# Patient Record
Sex: FEMALE | Race: WHITE | NOT HISPANIC OR LATINO | Employment: UNEMPLOYED | URBAN - METROPOLITAN AREA
[De-identification: names, ages, dates, MRNs, and addresses within clinical notes are randomized per-mention and may not be internally consistent; named-entity substitution may affect disease eponyms.]

---

## 2017-01-27 ENCOUNTER — ALLSCRIPTS OFFICE VISIT (OUTPATIENT)
Dept: OTHER | Facility: OTHER | Age: 82
End: 2017-01-27

## 2017-09-22 ENCOUNTER — ALLSCRIPTS OFFICE VISIT (OUTPATIENT)
Dept: OTHER | Facility: OTHER | Age: 82
End: 2017-09-22

## 2017-09-22 PROCEDURE — 88305 TISSUE EXAM BY PATHOLOGIST: CPT | Performed by: PHYSICIAN ASSISTANT

## 2017-09-26 ENCOUNTER — LAB REQUISITION (OUTPATIENT)
Dept: LAB | Facility: HOSPITAL | Age: 82
End: 2017-09-26
Payer: MEDICARE

## 2017-09-26 DIAGNOSIS — R21 RASH AND OTHER NONSPECIFIC SKIN ERUPTION: ICD-10-CM

## 2017-10-02 ENCOUNTER — GENERIC CONVERSION - ENCOUNTER (OUTPATIENT)
Dept: OTHER | Facility: OTHER | Age: 82
End: 2017-10-02

## 2018-01-11 NOTE — MISCELLANEOUS
Message  reviewed nocturnal pulse oximetry dated 8/25/16  Done of BIPAP 10/5 with 1 5L  She is instructed to increase oxygen to 4L with BIPAP 10/5   This was discussed with daughter SALINA      Signatures   Electronically signed by : Lennox Litter, APN; Sep  7 2016  1:07PM EST                       (Author)

## 2018-01-12 NOTE — MISCELLANEOUS
Plan  Hypoxia    · Nocturnal Pulse Oximetry; Status:Active;  Requested MICHELINE:[id number];     Signatures   Electronically signed by : BUD Ferrer; Sep  7 2016  1:10PM EST                       (Author)

## 2018-01-14 VITALS — HEART RATE: 68 BPM | SYSTOLIC BLOOD PRESSURE: 128 MMHG | DIASTOLIC BLOOD PRESSURE: 72 MMHG | RESPIRATION RATE: 16 BRPM

## 2018-01-17 NOTE — CONSULTS
I had the pleasure of evaluating your patient, SALINA COOLEY  My full evaluation follows:      History of Present Illness  Carmenza is a pleasant 80-year-old female who presents to us today for evaluation of a nonhealing wound right ear wound  She is accompanied by her daughter and granddaughter who state that the lesion has been present for over a year and is not healing  They said that the she does tend to lay on her right side due to muscular dystrophy  They were initially told by their primary care physician that this could be a pressure ulcer verses a basal cell carcinoma  The patient complains of tenderness and occasionally drainage from the area  Discussion/Summary    Carmenza is a pleasant 80-year-old female who presents to us today for evaluation of a nonhealing wound right ear wound  Please see HPI  Biopsy done in the office today we will follow up in 2 weeks to discuss pathology  I instructed her and her family to try using pillows or sponges with ear cut out to try to avoid any excess pressure in that area  I also discussed with them that the biopsy site may be difficult to heal  They understood and agreed  They were encouraged to follow up with any problems regarding the biopsy site  Thank you very much for allowing me to participate in the care of this patient  If you have any questions, please do not hesitate to contact me        Signatures   Electronically signed by : Lit Herbert, [facility]; Sep 22 2017  1:44PM EST                       (Author)    Electronically signed by : JOSE RAUL Mclean ; Sep 22 2017  3:19PM EST

## 2018-01-29 ENCOUNTER — TELEPHONE (OUTPATIENT)
Dept: FAMILY MEDICINE CLINIC | Facility: CLINIC | Age: 83
End: 2018-01-29

## 2018-01-29 DIAGNOSIS — J34.89 THICK NASAL MUCUS: Primary | ICD-10-CM

## 2018-01-29 RX ORDER — ACETYLCYSTEINE 200 MG/ML
SOLUTION ORAL; RESPIRATORY (INHALATION)
Qty: 10 ML | Refills: 0 | Status: SHIPPED | OUTPATIENT
Start: 2018-01-29 | End: 2018-02-07

## 2018-01-29 NOTE — TELEPHONE ENCOUNTER
Patients daughter called and wants to know If Flora Hyman still schedules home visits for Dr Javier Silence    Her daughter, also SALINA (works in radiology) and can be reached at work on

## 2018-01-30 DIAGNOSIS — R05.9 COUGH: Primary | ICD-10-CM

## 2018-01-30 RX ORDER — PROMETHAZINE HYDROCHLORIDE AND CODEINE PHOSPHATE 6.25; 1 MG/5ML; MG/5ML
5 SYRUP ORAL 3 TIMES DAILY PRN
Qty: 120 ML | Refills: 0 | Status: SHIPPED | OUTPATIENT
Start: 2018-01-30 | End: 2018-04-25 | Stop reason: ALTCHOICE

## 2018-01-30 NOTE — TELEPHONE ENCOUNTER
I called Tamia  to set up an appt for her mother and she doesn't need an appt for her mother at this time  Her sister called Dr Jose Angel Christiansen office  He gave her some medication for her "cold"  She will call back here if they do need an appt with Dr Flori Mosqueda

## 2018-02-02 ENCOUNTER — TELEPHONE (OUTPATIENT)
Dept: PULMONOLOGY | Facility: MEDICAL CENTER | Age: 83
End: 2018-02-02

## 2018-02-05 DIAGNOSIS — R05.9 COUGH: Primary | ICD-10-CM

## 2018-02-05 RX ORDER — BENZONATATE 100 MG/1
100 CAPSULE ORAL 2 TIMES DAILY PRN
Qty: 30 CAPSULE | Refills: 0 | Status: SHIPPED | OUTPATIENT
Start: 2018-02-05 | End: 2018-04-26

## 2018-02-05 NOTE — PROGRESS NOTES
Patient's daughter called about pesristent cough despite antibiotic and prednisone  Cough is non-productve, ne fever, or shortness of breath  Likely cough related to recent infection      Rx benzonatate 100 mg BID PRN and if does not help can increase dose to 200 mg BID

## 2018-02-08 ENCOUNTER — TELEPHONE (OUTPATIENT)
Dept: FAMILY MEDICINE CLINIC | Facility: CLINIC | Age: 83
End: 2018-02-08

## 2018-02-08 DIAGNOSIS — R06.02 SHORTNESS OF BREATH: Primary | ICD-10-CM

## 2018-02-08 RX ORDER — ALBUTEROL SULFATE 0.63 MG/3ML
SOLUTION RESPIRATORY (INHALATION) EVERY 8 HOURS
COMMUNITY
Start: 2016-01-27 | End: 2019-02-28 | Stop reason: SDUPTHER

## 2018-02-08 RX ORDER — ALBUTEROL SULFATE 0.63 MG/3ML
SOLUTION RESPIRATORY (INHALATION) EVERY 8 HOURS
Qty: 75 ML | Refills: 0 | Status: CANCELLED | OUTPATIENT
Start: 2018-02-08

## 2018-02-08 RX ORDER — IPRATROPIUM BROMIDE AND ALBUTEROL SULFATE 2.5; .5 MG/3ML; MG/3ML
3 SOLUTION RESPIRATORY (INHALATION)
Qty: 30 ML | Refills: 6 | Status: SHIPPED | OUTPATIENT
Start: 2018-02-08 | End: 2020-01-13 | Stop reason: SDUPTHER

## 2018-02-08 NOTE — TELEPHONE ENCOUNTER
Dr Ngozi Jauregui the family is calling asking about the albuteralthat they wanted to get from [facility]  Was this sent to the pharmacy?

## 2018-02-08 NOTE — TELEPHONE ENCOUNTER
I called patient regarding the mistake that I made yesterday with this Rx  I also told her that the rx will be sent to the pharmacy this afternoon    I also called [facility] Cho

## 2018-02-19 ENCOUNTER — TELEPHONE (OUTPATIENT)
Dept: FAMILY MEDICINE CLINIC | Facility: CLINIC | Age: 83
End: 2018-02-19

## 2018-02-19 NOTE — TELEPHONE ENCOUNTER
Carmenza calling because she wanted to speak with Blossom Maldonado regarding visiting nurse for her mom  She wanted to get Community visiting nursing  Also, she is looking to get a refill on predisone for her mom's cough      Please call Carmenza back at [phone]    Thank You

## 2018-04-25 ENCOUNTER — TELEPHONE (OUTPATIENT)
Dept: FAMILY MEDICINE CLINIC | Facility: CLINIC | Age: 83
End: 2018-04-25

## 2018-04-25 DIAGNOSIS — R05.9 COUGH: Primary | ICD-10-CM

## 2018-04-25 RX ORDER — DEXTROMETHORPHAN HYDROBROMIDE AND PROMETHAZINE HYDROCHLORIDE 15; 6.25 MG/5ML; MG/5ML
5 SYRUP ORAL 4 TIMES DAILY PRN
Qty: 118 ML | Refills: 0 | Status: SHIPPED | OUTPATIENT
Start: 2018-04-25 | End: 2018-05-02

## 2018-04-25 NOTE — TELEPHONE ENCOUNTER
Bri Son (daughter) is calling to schedule apt with Dr Buck Kras  He is not available today so she wanted to know if he could call something in for Bri Son  She is coughing, congested with phlegm        Please call daughter, Bri Son at [phone]    [facility]    Thanks

## 2018-04-25 NOTE — TELEPHONE ENCOUNTER
Aracelis Silva is aware that cough medicine was called in but she really wanted to get an antibiotic for her mom as well        Aracelis Silva daughter is [phone]    Thank you

## 2018-04-26 ENCOUNTER — APPOINTMENT (EMERGENCY)
Dept: RADIOLOGY | Facility: HOSPITAL | Age: 83
End: 2018-04-26
Payer: MEDICARE

## 2018-04-26 ENCOUNTER — HOSPITAL ENCOUNTER (EMERGENCY)
Facility: HOSPITAL | Age: 83
Discharge: HOME | End: 2018-04-27
Attending: EMERGENCY MEDICINE | Admitting: EMERGENCY MEDICINE
Payer: MEDICARE

## 2018-04-26 VITALS
BODY MASS INDEX: 41.81 KG/M2 | TEMPERATURE: 99.3 F | DIASTOLIC BLOOD PRESSURE: 65 MMHG | WEIGHT: 275 LBS | SYSTOLIC BLOOD PRESSURE: 146 MMHG | OXYGEN SATURATION: 93 % | HEART RATE: 106 BPM | RESPIRATION RATE: 22 BRPM

## 2018-04-26 DIAGNOSIS — N39.0 UTI (URINARY TRACT INFECTION): Primary | ICD-10-CM

## 2018-04-26 LAB
ALBUMIN SERPL BCP-MCNC: 3.1 G/DL (ref 3.5–5)
ALP SERPL-CCNC: 109 U/L (ref 46–116)
ALT SERPL W P-5'-P-CCNC: 19 U/L (ref 12–78)
ANION GAP SERPL CALCULATED.3IONS-SCNC: 6 MMOL/L (ref 4–13)
APTT PPP: 31 SECONDS (ref 23–35)
ARTERIAL PATENCY WRIST A: YES
AST SERPL W P-5'-P-CCNC: 13 U/L (ref 5–45)
BACTERIA UR QL AUTO: ABNORMAL /HPF
BASE EXCESS BLDA CALC-SCNC: -0.3 MMOL/L
BASOPHILS # BLD AUTO: 0 THOUSANDS/ΜL (ref 0–0.1)
BASOPHILS NFR BLD AUTO: 0 % (ref 0–1)
BILIRUB SERPL-MCNC: 0.3 MG/DL (ref 0.2–1)
BILIRUB UR QL STRIP: NEGATIVE
BODY TEMPERATURE: 99 DEGREES FEHRENHEIT
BUN SERPL-MCNC: 11 MG/DL (ref 5–25)
CALCIUM SERPL-MCNC: 9.4 MG/DL (ref 8.3–10.1)
CHLORIDE SERPL-SCNC: 101 MMOL/L (ref 100–108)
CLARITY UR: ABNORMAL
CO2 SERPL-SCNC: 34 MMOL/L (ref 21–32)
COLOR UR: YELLOW
CREAT SERPL-MCNC: 0.31 MG/DL (ref 0.6–1.3)
EOSINOPHIL # BLD AUTO: 0 THOUSAND/ΜL (ref 0–0.61)
EOSINOPHIL NFR BLD AUTO: 0 % (ref 0–6)
ERYTHROCYTE [DISTWIDTH] IN BLOOD BY AUTOMATED COUNT: 15.2 % (ref 11.6–15.1)
GFR SERPL CREATININE-BSD FRML MDRD: 103 ML/MIN/1.73SQ M
GLUCOSE SERPL-MCNC: 112 MG/DL (ref 65–140)
GLUCOSE UR STRIP-MCNC: NEGATIVE MG/DL
HCO3 BLDA-SCNC: 25.3 MMOL/L (ref 22–28)
HCT VFR BLD AUTO: 40.5 % (ref 37–47)
HGB BLD-MCNC: 13.1 G/DL (ref 12–16)
HGB UR QL STRIP.AUTO: ABNORMAL
INR PPP: 1.04 (ref 0.86–1.16)
KETONES UR STRIP-MCNC: ABNORMAL MG/DL
LACTATE SERPL-SCNC: 0.8 MMOL/L (ref 0.5–2)
LEUKOCYTE ESTERASE UR QL STRIP: ABNORMAL
LYMPHOCYTES # BLD AUTO: 0.9 THOUSANDS/ΜL (ref 0.6–4.47)
LYMPHOCYTES NFR BLD AUTO: 9 % (ref 14–44)
MCH RBC QN AUTO: 27.7 PG (ref 27–31)
MCHC RBC AUTO-ENTMCNC: 32.4 G/DL (ref 31.4–37.4)
MCV RBC AUTO: 86 FL (ref 82–98)
MONOCYTES # BLD AUTO: 0.6 THOUSAND/ΜL (ref 0.17–1.22)
MONOCYTES NFR BLD AUTO: 6 % (ref 4–12)
NASAL CANNULA: 2
NEUTROPHILS # BLD AUTO: 8.6 THOUSANDS/ΜL (ref 1.85–7.62)
NEUTS SEG NFR BLD AUTO: 84 % (ref 43–75)
NITRITE UR QL STRIP: POSITIVE
NON-SQ EPI CELLS URNS QL MICRO: ABNORMAL /HPF
NRBC BLD AUTO-RTO: 0 /100 WBCS
O2 CT BLDA-SCNC: 16.3 ML/DL (ref 16–23)
OXYHGB MFR BLDA: 91.2 % (ref 94–97)
PCO2 BLDA: 45.2 MM HG (ref 36–44)
PCO2 TEMP ADJ BLDA: 45.6 MM HG (ref 36–44)
PH BLD: 7.36 [PH] (ref 7.35–7.45)
PH BLDA: 7.37 [PH] (ref 7.35–7.45)
PH UR STRIP.AUTO: 6 [PH] (ref 5–9)
PLATELET # BLD AUTO: 232 THOUSANDS/UL (ref 130–400)
PLATELET BLD QL SMEAR: ADEQUATE
PMV BLD AUTO: 7 FL (ref 8.9–12.7)
PO2 BLD: 63.5 MM HG (ref 75–129)
PO2 BLDA: 62.6 MM HG (ref 75–129)
POTASSIUM SERPL-SCNC: 4.3 MMOL/L (ref 3.5–5.3)
PROT SERPL-MCNC: 7.3 G/DL (ref 6.4–8.2)
PROT UR STRIP-MCNC: NEGATIVE MG/DL
PROTHROMBIN TIME: 10.9 SECONDS (ref 9.4–11.7)
RBC # BLD AUTO: 4.73 MILLION/UL (ref 4.2–5.4)
RBC #/AREA URNS AUTO: ABNORMAL /HPF
SODIUM SERPL-SCNC: 141 MMOL/L (ref 136–145)
SP GR UR STRIP.AUTO: <=1.005 (ref 1–1.03)
SPECIMEN SOURCE: ABNORMAL
TROPONIN I SERPL-MCNC: <0.02 NG/ML
UROBILINOGEN UR QL STRIP.AUTO: 0.2 E.U./DL
WBC # BLD AUTO: 10.2 THOUSAND/UL (ref 4.8–10.8)
WBC #/AREA URNS AUTO: ABNORMAL /HPF

## 2018-04-26 PROCEDURE — 83605 ASSAY OF LACTIC ACID: CPT | Performed by: EMERGENCY MEDICINE

## 2018-04-26 PROCEDURE — 87086 URINE CULTURE/COLONY COUNT: CPT | Performed by: EMERGENCY MEDICINE

## 2018-04-26 PROCEDURE — 87633 RESP VIRUS 12-25 TARGETS: CPT | Performed by: EMERGENCY MEDICINE

## 2018-04-26 PROCEDURE — 87186 SC STD MICRODIL/AGAR DIL: CPT | Performed by: EMERGENCY MEDICINE

## 2018-04-26 PROCEDURE — 85610 PROTHROMBIN TIME: CPT | Performed by: EMERGENCY MEDICINE

## 2018-04-26 PROCEDURE — 87040 BLOOD CULTURE FOR BACTERIA: CPT | Performed by: EMERGENCY MEDICINE

## 2018-04-26 PROCEDURE — 85025 COMPLETE CBC W/AUTO DIFF WBC: CPT | Performed by: EMERGENCY MEDICINE

## 2018-04-26 PROCEDURE — 80053 COMPREHEN METABOLIC PANEL: CPT | Performed by: EMERGENCY MEDICINE

## 2018-04-26 PROCEDURE — 36600 WITHDRAWAL OF ARTERIAL BLOOD: CPT

## 2018-04-26 PROCEDURE — 84484 ASSAY OF TROPONIN QUANT: CPT | Performed by: EMERGENCY MEDICINE

## 2018-04-26 PROCEDURE — 81001 URINALYSIS AUTO W/SCOPE: CPT | Performed by: EMERGENCY MEDICINE

## 2018-04-26 PROCEDURE — 94640 AIRWAY INHALATION TREATMENT: CPT

## 2018-04-26 PROCEDURE — 36415 COLL VENOUS BLD VENIPUNCTURE: CPT | Performed by: EMERGENCY MEDICINE

## 2018-04-26 PROCEDURE — 85730 THROMBOPLASTIN TIME PARTIAL: CPT | Performed by: EMERGENCY MEDICINE

## 2018-04-26 PROCEDURE — 71045 X-RAY EXAM CHEST 1 VIEW: CPT

## 2018-04-26 PROCEDURE — 87077 CULTURE AEROBIC IDENTIFY: CPT | Performed by: EMERGENCY MEDICINE

## 2018-04-26 PROCEDURE — 82805 BLOOD GASES W/O2 SATURATION: CPT | Performed by: EMERGENCY MEDICINE

## 2018-04-26 RX ORDER — IPRATROPIUM BROMIDE AND ALBUTEROL SULFATE 2.5; .5 MG/3ML; MG/3ML
SOLUTION RESPIRATORY (INHALATION)
Status: COMPLETED
Start: 2018-04-26 | End: 2018-04-26

## 2018-04-26 RX ORDER — CEPHALEXIN 250 MG/1
500 CAPSULE ORAL 4 TIMES DAILY
Qty: 28 CAPSULE | Refills: 0 | Status: SHIPPED | OUTPATIENT
Start: 2018-04-26 | End: 2018-05-03

## 2018-04-26 RX ORDER — FUROSEMIDE 20 MG/1
20 TABLET ORAL DAILY
COMMUNITY
End: 2019-01-14 | Stop reason: SDUPTHER

## 2018-04-26 RX ORDER — IPRATROPIUM BROMIDE AND ALBUTEROL SULFATE 2.5; .5 MG/3ML; MG/3ML
3 SOLUTION RESPIRATORY (INHALATION)
Status: DISCONTINUED | OUTPATIENT
Start: 2018-04-26 | End: 2018-04-26 | Stop reason: SDUPTHER

## 2018-04-26 RX ORDER — CEPHALEXIN 500 MG/1
500 CAPSULE ORAL ONCE
Status: COMPLETED | OUTPATIENT
Start: 2018-04-26 | End: 2018-04-26

## 2018-04-26 RX ADMIN — CEPHALEXIN 500 MG: 500 CAPSULE ORAL at 23:50

## 2018-04-26 RX ADMIN — IPRATROPIUM BROMIDE AND ALBUTEROL SULFATE 3 ML: 2.5; .5 SOLUTION RESPIRATORY (INHALATION) at 21:18

## 2018-04-26 RX ADMIN — IPRATROPIUM BROMIDE AND ALBUTEROL SULFATE 3 ML: .5; 3 SOLUTION RESPIRATORY (INHALATION) at 21:18

## 2018-04-27 PROCEDURE — 99285 EMERGENCY DEPT VISIT HI MDM: CPT

## 2018-04-27 NOTE — ED NOTES
May from the lab called and informed this nurse of procalcitonin hemolysis   Dr Emeka Clarke will be made aware upon arrival      Bala Restrepo RN  04/27/18 3901

## 2018-04-27 NOTE — ED NOTES
Dr Ara Baldwin aware of procalcitonin recollect orders  States that since patient was discharged there is nothing additional to be done       Kyle Gary RN  04/27/18 9346

## 2018-04-27 NOTE — ED PROVIDER NOTES
History  Chief Complaint   Patient presents with    Weakness - Generalized     pt presents to the ed with cough and weakness x2 days  as per daughter    Suni Bolden Cough     80year-old female presents with generalized weakness cough congestion over the last few days  Patient states she is not having real high temperatures however temp was 99° here  O2 sats 93% on room air  Patient is awake and alert appears to be quite weak  History provided by:  Patient and relative   used: No        Prior to Admission Medications   Prescriptions Last Dose Informant Patient Reported? Taking?   acetaminophen (TYLENOL) 325 mg tablet   No No   Sig: Take one or 2 every 6 hours as needed for pain   albuterol (ACCUNEB) 0 63 MG/3ML nebulizer solution   Yes No   Sig: Inhale every 8 (eight) hours   celecoxib (CeleBREX) 200 mg capsule   Yes No   Sig: Take 200 mg by mouth daily  diltiazem (CARDIZEM) 120 MG tablet   Yes No   Sig: Take 120 mg by mouth 2 (two) times a day  furosemide (LASIX) 20 mg tablet   Yes Yes   Sig: Take 20 mg by mouth daily Pt takes half a pill 3 x a week   ipratropium-albuterol (DUO-NEB) 0 5-2 5 mg/3 mL   No No   Sig: Take 3 mL by nebulization every 6 (six) hours   metoprolol succinate (TOPROL-XL) 25 mg 24 hr tablet   Yes No   Sig: Take 25 mg by mouth 2 (two) times a day  promethazine-dextromethorphan (PHENERGAN-DM) 6 25-15 mg/5 mL oral syrup   No No   Sig: Take 5 mL by mouth 4 (four) times a day as needed for cough for up to 7 days      Facility-Administered Medications: None       Past Medical History:   Diagnosis Date    Arthritis     Muscular dystrophy ([facility] [address] )        Past Surgical History:   Procedure Laterality Date    APPENDECTOMY      CHOLECYSTECTOMY      HYSTERECTOMY      THYROID CYST EXCISION      VEIN SURGERY Bilateral        History reviewed  No pertinent family history  I have reviewed and agree with the history as documented      Social History   Substance Use Topics    Smoking status: Never Smoker    Smokeless tobacco: Not on file    Alcohol use No        Review of Systems   Constitutional: Negative for activity change, chills, diaphoresis and fever  HENT: Negative for congestion, ear pain, nosebleeds, sore throat, trouble swallowing and voice change  Eyes: Negative for pain, discharge and redness  Respiratory: Positive for cough and shortness of breath  Negative for apnea, choking, wheezing and stridor  Cardiovascular: Negative for chest pain and palpitations  Gastrointestinal: Negative for abdominal distention, abdominal pain, constipation, diarrhea, nausea and vomiting  Endocrine: Negative for polydipsia  Genitourinary: Negative for difficulty urinating, dysuria, flank pain, frequency, hematuria and urgency  Musculoskeletal: Negative for back pain, gait problem, joint swelling, myalgias, neck pain and neck stiffness  Skin: Negative for pallor and rash  Neurological: Positive for weakness  Negative for dizziness, tremors, syncope, speech difficulty, numbness and headaches  Hematological: Negative for adenopathy  Psychiatric/Behavioral: Negative for confusion, hallucinations, self-injury and suicidal ideas  The patient is not nervous/anxious  Physical Exam  ED Triage Vitals [04/26/18 2116]   Temperature Pulse Respirations Blood Pressure SpO2   99 °F (37 2 °C) 92 20 156/78 94 %      Temp Source Heart Rate Source Patient Position - Orthostatic VS BP Location FiO2 (%)   Tympanic Monitor -- -- --      Pain Score       --           Orthostatic Vital Signs  Vitals:    04/26/18 2116 04/26/18 2215 04/26/18 2230   BP: 156/78     Pulse: 92 96 96       Physical Exam   Constitutional: She is oriented to person, place, and time  Vital signs are normal  She appears well-developed and well-nourished  HENT:   Head: Normocephalic and atraumatic     Right Ear: External ear normal    Left Ear: External ear normal    Nose: Nose normal    Mouth/Throat: Oropharynx is clear and moist    Eyes: Conjunctivae and EOM are normal  Pupils are equal, round, and reactive to light  Neck: Normal range of motion  Neck supple  Cardiovascular: Normal rate, regular rhythm, normal heart sounds and intact distal pulses  Pulmonary/Chest: She is in respiratory distress  She has rales  Abdominal: Soft  Bowel sounds are normal    Musculoskeletal: Normal range of motion  Neurological: She is alert and oriented to person, place, and time  Skin: Skin is warm  Psychiatric: She has a normal mood and affect  Nursing note and vitals reviewed  ED Medications  Medications   cephalexin (KEFLEX) capsule 500 mg (not administered)       Diagnostic Studies  Results Reviewed     Procedure Component Value Units Date/Time    Urine Microscopic [[id number]]  (Abnormal) Collected:  04/26/18 2258    Lab Status:  Final result Specimen:  Urine from Urine, Clean Catch Updated:  04/26/18 2316     RBC, UA 0-1 (A) /hpf      WBC, UA 0-1 (A) /hpf      Epithelial Cells Moderate (A) /hpf      Bacteria, UA Innumerable (A) /hpf     Procalcitonin [[id number]] Collected:  04/26/18 2310    Lab Status: In process Specimen:  Blood from Arm, Right Updated:  04/26/18 2313    Respiratory Pathogen Profile, PCR [[id number]] Collected:  04/26/18 2310    Lab Status:   In process Specimen:  Nasopharyngeal from Nasopharyngeal Swab Updated:  04/26/18 2313    UA w Reflex to Microscopic [[id number]]  (Abnormal) Collected:  04/26/18 2258    Lab Status:  Final result Specimen:  Urine from Urine, Clean Catch Updated:  04/26/18 2306     Color, UA Yellow     Clarity, UA Slightly Cloudy     Specific Gravity, UA <=1 005     pH, UA 6 0     Leukocytes, UA Trace (A)     Nitrite, UA Positive (A)     Protein, UA Negative mg/dl      Glucose, UA Negative mg/dl      Ketones, UA 15 (1+) (A) mg/dl      Urobilinogen, UA 0 2 E U /dl      Bilirubin, UA Negative     Blood, UA Small (A)    Urine culture [[id number]] Collected:  04/26/18 2258    Lab Status: In process Specimen:  Urine from Urine, Clean Catch Updated:  04/26/18 2303    Lactic acid, plasma [[id number]]  (Normal) Collected:  04/26/18 2138    Lab Status:  Final result Specimen:  Blood from Arm, Left Updated:  04/26/18 2213     LACTIC ACID 0 8 mmol/L     Narrative:         Result may be elevated if tourniquet was used during collection  Troponin I [[id number]]  (Normal) Collected:  04/26/18 2138    Lab Status:  Final result Specimen:  Blood from Arm, Left Updated:  04/26/18 2211     Troponin I <0 02 ng/mL     Narrative:         [employer] Chemistry analyzer 99% cutoff is > 0 04 ng/mL in network labs    o cTnI 99% cutoff is useful only when applied to patients in the clinical setting of myocardial ischemia  o cTnI 99% cutoff should be interpreted in the context of clinical history, ECG findings and possibly cardiac imaging to establish correct diagnosis  o cTnI 99% cutoff may be suggestive but clearly not indicative of a coronary event without the clinical setting of myocardial ischemia      Blood gas, arterial [[id number]]  (Abnormal) Collected:  04/26/18 2156    Lab Status:  Final result Specimen:  Blood, Arterial from Radial, Left Updated:  04/26/18 2210     pH, Arterial 7 366     PH ART TC 7 363     pCO2, Arterial 45 2 (H) mm Hg      PCO2 (TC) Arterial 45 6 (H) mm Hg      pO2, Arterial 62 6 (L) mm Hg      PO2 (TC) Arterial 63 5 (L) mm Hg      HCO3, Arterial 25 3 mmol/L      Base Excess, Arterial -0 3 mmol/L      O2 Content, Arterial 16 3 mL/dL      O2 HGB,Arterial  91 2 (L) %      SOURCE Radial, Left     MANDO TEST Yes     Temperature 99 0 Degrees Fehrenheit      Nasal Cannula 2    Comprehensive metabolic panel [[id number]]  (Abnormal) Collected:  04/26/18 2138    Lab Status:  Final result Specimen:  Blood from Arm, Left Updated:  04/26/18 2208     Sodium 141 mmol/L      Potassium 4 3 mmol/L      Chloride 101 mmol/L      CO2 34 (H) mmol/L      Anion Gap 6 mmol/L      BUN 11 mg/dL      Creatinine 0 31 (L) mg/dL Glucose 112 mg/dL      Calcium 9 4 mg/dL      AST 13 U/L      ALT 19 U/L      Alkaline Phosphatase 109 U/L      Total Protein 7 3 g/dL      Albumin 3 1 (L) g/dL      Total Bilirubin 0 30 mg/dL      eGFR 103 ml/min/1 73sq m     Narrative:         National Kidney Disease Education Program recommendations are as follows:  GFR calculation is accurate only with a steady state creatinine  Chronic Kidney disease less than 60 ml/min/1 73 sq  meters  Kidney failure less than 15 ml/min/1 73 sq  meters  Protime-INR [[id number]]  (Normal) Collected:  04/26/18 2138    Lab Status:  Final result Specimen:  Blood from Arm, Left Updated:  04/26/18 2201     Protime 10 9 seconds      INR 1 04    APTT [[id number]]  (Normal) Collected:  04/26/18 2138    Lab Status:  Final result Specimen:  Blood from Arm, Left Updated:  04/26/18 2201     PTT 31 seconds     CBC and differential [[id number]]  (Abnormal) Collected:  04/26/18 2138    Lab Status:  Final result Specimen:  Blood from Arm, Left Updated:  04/26/18 2200     WBC 10 20 Thousand/uL      RBC 4 73 Million/uL      Hemoglobin 13 1 g/dL      Hematocrit 40 5 %      MCV 86 fL      MCH 27 7 pg      MCHC 32 4 g/dL      RDW 15 2 (H) %      MPV 7 0 (L) fL      Platelets 611 Thousands/uL      nRBC 0 /100 WBCs      Neutrophils Relative 84 (H) %      Lymphocytes Relative 9 (L) %      Monocytes Relative 6 %      Eosinophils Relative 0 %      Basophils Relative 0 %      Neutrophils Absolute 8 60 (H) Thousands/µL      Lymphocytes Absolute 0 90 Thousands/µL      Monocytes Absolute 0 60 Thousand/µL      Eosinophils Absolute 0 00 Thousand/µL      Basophils Absolute 0 00 Thousands/µL     Blood culture #2 [[id number]] Collected:  04/26/18 2138    Lab Status: In process Specimen:  Blood from Arm, Left Updated:  04/26/18 2146    Blood culture #1 [[id number]] Collected:  04/26/18 2138    Lab Status:   In process Specimen:  Blood from Arm, Left Updated:  04/26/18 2146                 XR chest 1 view portable (Results Pending)              Procedures  Procedures       Phone Contacts  ED Phone Contact    ED Course                               MDM  CritCare Time    Disposition  Final diagnoses:   UTI (urinary tract infection)     Time reflects when diagnosis was documented in both MDM as applicable and the Disposition within this note     Time User Action Codes Description Comment    4/26/2018 11:28 PM Cristy CHAPA Add [N39 0] UTI (urinary tract infection)       ED Disposition     ED Disposition Condition Comment    Discharge  [facility] [address] discharge to home/self care  Condition at discharge: Stable        Follow-up Information     Follow up With Specialties Details Why DO [facility] Schedule an appointment as soon as possible for a visit  [address]  [phone]          Patient's Medications   Discharge Prescriptions    CEPHALEXIN (KEFLEX) 250 MG CAPSULE    Take 2 capsules (500 mg total) by mouth 4 (four) times a day for 7 days       Start Date: 4/26/2018 End Date: 5/3/2018       Order Dose: 500 mg       Quantity: 28 capsule    Refills: 0     No discharge procedures on file      ED Provider  Electronically Signed by           Mckayla Herrera DO  04/26/18 8169

## 2018-04-27 NOTE — DISCHARGE INSTRUCTIONS

## 2018-04-28 LAB
ADENOVIRUS: NOT DETECTED
C PNEUM DNA SPEC QL NAA+PROBE: NOT DETECTED
FLUAV H1 RNA SPEC QL NAA+PROBE: NOT DETECTED
FLUAV H3 RNA SPEC QL NAA+PROBE: NOT DETECTED
FLUAV RNA SPEC QL NAA+PROBE: NOT DETECTED
FLUBV RNA SPEC QL NAA+PROBE: NOT DETECTED
HBOV DNA SPEC QL NAA+PROBE: NOT DETECTED
HCOV 229E RNA SPEC QL NAA+PROBE: NOT DETECTED
HCOV HKU1 RNA SPEC QL NAA+PROBE: NOT DETECTED
HCOV NL63 RNA SPEC QL NAA+PROBE: NOT DETECTED
HCOV OC43 RNA SPEC QL NAA+PROBE: NOT DETECTED
HPIV1 RNA SPEC QL NAA+PROBE: NOT DETECTED
HPIV2 RNA SPEC QL NAA+PROBE: NOT DETECTED
HPIV3 RNA SPEC QL NAA+PROBE: NOT DETECTED
HPIV4 RNA SPEC QL NAA+PROBE: NOT DETECTED
M PNEUMO DNA SPEC QL NAA+PROBE: NOT DETECTED
METAPNEUMOVIRUS: NOT DETECTED
RHINOVIRUS RNA SPEC QL NAA+PROBE: DETECTED
RSV A RNA SPEC QL NAA+PROBE: NOT DETECTED
RSV B RNA SPEC QL NAA+PROBE: NOT DETECTED

## 2018-04-29 LAB — BACTERIA UR CULT: ABNORMAL

## 2018-05-02 LAB
BACTERIA BLD CULT: NORMAL
BACTERIA BLD CULT: NORMAL

## 2018-05-08 ENCOUNTER — TELEPHONE (OUTPATIENT)
Dept: FAMILY MEDICINE CLINIC | Facility: CLINIC | Age: 83
End: 2018-05-08

## 2018-05-08 NOTE — TELEPHONE ENCOUNTER
Spoke with Killian Russell from  [facility]       Called  In Bactrim #6 1po BID x3 days      Confirmed dose with dr Suni Romeo  As  abx had only a qty of 1 and was not sent to a pharmacy  af/rma

## 2018-05-10 ENCOUNTER — TELEPHONE (OUTPATIENT)
Dept: FAMILY MEDICINE CLINIC | Facility: CLINIC | Age: 83
End: 2018-05-10

## 2018-05-10 DIAGNOSIS — N39.0 ACUTE UTI: Primary | ICD-10-CM

## 2018-05-10 NOTE — TELEPHONE ENCOUNTER
Her Daughter called and is asking to verify the dose of the bactrium it was sent for one tab and Dr Gabby Lee made it for three days, should this be longer? Please call 1 Mely Bailey to confirm    Daughter is at [phone] at the [facility]

## 2018-05-11 RX ORDER — SULFAMETHOXAZOLE AND TRIMETHOPRIM 800; 160 MG/1; MG/1
1 TABLET ORAL EVERY 12 HOURS SCHEDULED
Qty: 8 TABLET | Refills: 0 | Status: SHIPPED | OUTPATIENT
Start: 2018-05-11 | End: 2018-05-15

## 2018-05-11 NOTE — TELEPHONE ENCOUNTER
Lanelle Holter is aware Dr GILBERT sent the antibiotics   She follows with Dr Deric Black for home visits since she is homebound and I will forward this message to Dr Deric Black to address the order for [address]

## 2018-05-11 NOTE — TELEPHONE ENCOUNTER
Dr Marya Red called back looking for the additional antibiotics since Dr Cielo Ruffin had wanted her on them for one week   Would you be able to give them the additional 8 pills and then forward this to Dr Cielo Ruffin since they are also looking for an order for her to have PT (it's respiratory PT) The PT order message can be addressed by Dr Cielo Ruffin next week [facility]

## 2018-05-14 NOTE — TELEPHONE ENCOUNTER
Please find out where the request for the Respirtory Therapist was printed and send it to the appropriate person requested

## 2018-05-15 NOTE — TELEPHONE ENCOUNTER
This is a [facility] patient but the daughter has been calling here for orders  She was a former Dr Durrell Runner patient  Would someone please write her PT order that her daughter Cristina Ramirez requested? Thanks!  Aleyda Gonzalez

## 2018-05-29 ENCOUNTER — TELEPHONE (OUTPATIENT)
Dept: FAMILY MEDICINE CLINIC | Facility: CLINIC | Age: 83
End: 2018-05-29

## 2018-05-29 NOTE — TELEPHONE ENCOUNTER
Patient's daughter would like to speak with you  The last time you saw her mother you mentioned something about a Respiratory Therapist   She would like to speak with you about that

## 2018-05-29 NOTE — TELEPHONE ENCOUNTER
Patient asking for respiratory therapy  I thought I ordered that when I saw her last time  Please check on this and let me know  If I didn't then I will order it; if it was ordered, where did the order go?

## 2018-06-06 ENCOUNTER — TELEPHONE (OUTPATIENT)
Dept: FAMILY MEDICINE CLINIC | Facility: CLINIC | Age: 83
End: 2018-06-06

## 2018-06-06 DIAGNOSIS — G71.00 MD (MUSCULAR DYSTROPHY) (HCC): ICD-10-CM

## 2018-06-06 DIAGNOSIS — R06.89 DIFFICULTY BREATHING: Primary | ICD-10-CM

## 2018-06-06 DIAGNOSIS — J44.1 CHRONIC OBSTRUCTIVE PULMONARY DISEASE WITH ACUTE EXACERBATION (HCC): ICD-10-CM

## 2018-06-07 NOTE — TELEPHONE ENCOUNTER
Barbara Conner can you redo the order for the patient? It needs to say Physical Therapy instead of Pulmonology  Please use comments from original order

## 2018-07-12 ENCOUNTER — TELEPHONE (OUTPATIENT)
Dept: FAMILY MEDICINE CLINIC | Facility: CLINIC | Age: 83
End: 2018-07-12

## 2018-07-12 DIAGNOSIS — J20.9 ACUTE BRONCHITIS, UNSPECIFIED ORGANISM: ICD-10-CM

## 2018-07-12 DIAGNOSIS — J20.9 CHRONIC BRONCHITIS WITH ACUTE EXACERBATION (HCC): Primary | ICD-10-CM

## 2018-07-12 DIAGNOSIS — R06.2 WHEEZING: Primary | ICD-10-CM

## 2018-07-12 DIAGNOSIS — J42 CHRONIC BRONCHITIS WITH ACUTE EXACERBATION (HCC): Primary | ICD-10-CM

## 2018-07-12 RX ORDER — AZITHROMYCIN 250 MG/1
TABLET, FILM COATED ORAL
Qty: 6 TABLET | Refills: 0 | Status: SHIPPED | OUTPATIENT
Start: 2018-07-12 | End: 2018-07-16

## 2018-07-12 RX ORDER — PREDNISONE 20 MG/1
TABLET ORAL
Qty: 10 TABLET | Refills: 0 | Status: SHIPPED | OUTPATIENT
Start: 2018-07-12 | End: 2019-02-21

## 2018-07-12 NOTE — PROGRESS NOTES
I spoke to patient's daughter Carlos Stewart  Patient has been having persistent cough for the past few days difficulty expectorating mucus  Also some wheezing shortness of breath    I will prescribe 5 day azithromycin pack for asthmatic bronchitis and prednisone 40 mg x5 and 20 mg x5  She can use her nebulizer with albuterol 2 5 mg 3 times a day and take Robitussin over-the-counter as an expectorant    Patient is bed bound and family would like visiting nurse to see patient    Will make referral for visiting nurse

## 2018-07-12 NOTE — TELEPHONE ENCOUNTER
Spoke with [employer]  She states her sister Jeff Saldaña is away so she has been taking care of her mother  [employer] reports her mother is experiencing lots of "phlegm buildup" that she cannot expectorate  She is currently on ABX  The daughter, along with the assistance of another person, is able to get her into a wheelchair which aids in her coughing  Advised patients daughter that Dr Myra Curiel is out of the office until Tuesday and encouraged daughter to continue getting her out of bed safely as much as possible  Patients daughter is requesting Visiting nurses to come and perform percussion to help patient expectorate mucus

## 2018-07-12 NOTE — PROGRESS NOTES
I spoke with patient's daughter, Panchito Horner  Patient has had some persistent cough which is worse now over the past few days and difficulty expectorate mucus and wheezing     I will prescribe a 5 day Z-Herrera and prednisone 40 mg for 5 days then 20 mg for 5 days    Will try to get visiting nurse to see patient as she is home bound and bed ridden

## 2018-07-12 NOTE — TELEPHONE ENCOUNTER
Ngozi Briones left me a VM with concerns about the patients health  This is a Dr Liz Walsh patient  Can you please triage this call and advise that Dr Liz Walsh is only available on Tuesday and Wednesday

## 2018-07-12 NOTE — TELEPHONE ENCOUNTER
Pt daughter called to say that pt has a chest cold and wanted dr Gianfranco Lopez to come out asap   Explain to daughter, per Tita Sears that dr Gianfranco Lopez only made house calls tues and wed and was advised to take pt to er

## 2018-07-13 ENCOUNTER — TELEPHONE (OUTPATIENT)
Dept: FAMILY MEDICINE CLINIC | Facility: CLINIC | Age: 83
End: 2018-07-13

## 2018-07-17 ENCOUNTER — TELEPHONE (OUTPATIENT)
Dept: FAMILY MEDICINE CLINIC | Facility: CLINIC | Age: 83
End: 2018-07-17

## 2018-07-17 DIAGNOSIS — G71.00 MUSCULAR DYSTROPHY (HCC): Primary | ICD-10-CM

## 2018-07-17 NOTE — TELEPHONE ENCOUNTER
Daughter Frandy You called regarding her mother  She has been sick and needs to be seen by visiting nurses  Dr Natalie Arias is out until next week  Can we please order visiting nursing services for her mother  Please call Frandy You (daughter) back today with outcome  She called [facility], but they stated that no one there will assist her  She needs to be seen, she can't wait until next week

## 2018-07-17 NOTE — TELEPHONE ENCOUNTER
Dr Aracely Davis,    I forwarded this to Dr Doni White   He said he will write the order for her [facility] L[facility]

## 2018-07-20 ENCOUNTER — TELEPHONE (OUTPATIENT)
Dept: FAMILY MEDICINE CLINIC | Facility: CLINIC | Age: 83
End: 2018-07-20

## 2018-08-06 DIAGNOSIS — R05.9 COUGH: Primary | ICD-10-CM

## 2018-08-06 RX ORDER — BENZONATATE 100 MG/1
100 CAPSULE ORAL 3 TIMES DAILY PRN
Qty: 30 CAPSULE | Refills: 1 | Status: SHIPPED | OUTPATIENT
Start: 2018-08-06 | End: 2019-02-21

## 2018-08-06 NOTE — PROGRESS NOTES
Patient is having some residual cough  Daughter called asking for a antitussiive medication  The cough is nonproductive  Benzonatate help her cough previously  I did place an order for this    I set of her cough is not better worsening that she will need to be evaluated and consider chest x-ray as well

## 2018-08-07 ENCOUNTER — TELEPHONE (OUTPATIENT)
Dept: FAMILY MEDICINE CLINIC | Facility: CLINIC | Age: 83
End: 2018-08-07

## 2018-08-07 DIAGNOSIS — B37.2 CANDIDA INFECTION OF FLEXURAL SKIN: Primary | ICD-10-CM

## 2018-08-07 NOTE — TELEPHONE ENCOUNTER
Dr Yoana Mcdonald patient    Patient uses nistatin topical 15% cream for rash under her breast, but it is not helping  Visiting nurse suggested powder instead of cream  Please send rx for same to [facility]

## 2018-08-08 RX ORDER — NYSTATIN 100000 [USP'U]/G
POWDER TOPICAL 4 TIMES DAILY
Qty: 56.7 G | Refills: 3 | Status: SHIPPED | OUTPATIENT
Start: 2018-08-08 | End: 2019-06-27 | Stop reason: SDUPTHER

## 2018-08-15 ENCOUNTER — TELEPHONE (OUTPATIENT)
Dept: FAMILY MEDICINE CLINIC | Facility: CLINIC | Age: 83
End: 2018-08-15

## 2018-08-15 NOTE — TELEPHONE ENCOUNTER
Patient of Dr Italia Iqbal:    Patients daughter Joaquim Lantigua) called  Physical therapy was in the home and would like for the patient to receive a new order for a canvas lift for the jaye lift to be faxed to [facility]  [phone]  Her approximate weight must be documented on the order as well  They believe her approximate weight Is 300lb  Patients daughter would like a phone call on her work line upon completion

## 2018-08-21 NOTE — TELEPHONE ENCOUNTER
Michael Christopher (daughter) also called to request an order for "evaluation for portable oxygen" be faxed to [address] "benedict Lindsay

## 2018-08-23 ENCOUNTER — TELEPHONE (OUTPATIENT)
Dept: FAMILY MEDICINE CLINIC | Facility: CLINIC | Age: 83
End: 2018-08-23

## 2018-08-23 NOTE — TELEPHONE ENCOUNTER
FYI Pts daughter called to say that they use [facility] and the woman they work with from there is Julieta Solis

## 2018-08-30 ENCOUNTER — TELEPHONE (OUTPATIENT)
Dept: FAMILY MEDICINE CLINIC | Facility: CLINIC | Age: 83
End: 2018-08-30

## 2018-08-30 NOTE — TELEPHONE ENCOUNTER
Mayank calling regarding portable oxygen which she does not qualify for  [place] from [facility] was calling to let dr know she needs to be evaluated for oxygen      Any questions please call Barbara Mackenzie at [phone]    Thanks     Not sure who this should go to because she does not have PCP

## 2018-08-31 NOTE — TELEPHONE ENCOUNTER
PARRISH FLORES AT [facility] ADVISED HER THAT DR Raúl Earl IS OFFICIALLY RETIRED AND THIS PT WOULD HAVE TO BE SEEN IN THE OFFICE FOR ANY ORDERS TO BE WRITTEN, MAYBE SHE COULD ASK DR Russell [address]

## 2018-08-31 NOTE — TELEPHONE ENCOUNTER
Please schedule ov  Ruddy Ordoñez She should prob be seen by Dr Charlie Green for oxygen as he is pulm    Last time seen here was by Dr Cherise Mccoy 1/27/17

## 2018-09-20 ENCOUNTER — TREATMENT (OUTPATIENT)
Dept: FAMILY MEDICINE CLINIC | Facility: CLINIC | Age: 83
End: 2018-09-20
Payer: MEDICARE

## 2018-09-20 DIAGNOSIS — I10 ESSENTIAL HYPERTENSION: ICD-10-CM

## 2018-09-20 DIAGNOSIS — G71.09 MUSCULAR DYSTROPHY, LIMB GIRDLE (HCC): ICD-10-CM

## 2018-09-20 DIAGNOSIS — E66.01 MORBID OBESITY (HCC): ICD-10-CM

## 2018-09-20 DIAGNOSIS — Z76.89 ENCOUNTER TO ESTABLISH CARE WITH NEW DOCTOR: Primary | ICD-10-CM

## 2018-09-20 DIAGNOSIS — M15.9 GENERALIZED OSTEOARTHRITIS: ICD-10-CM

## 2018-09-20 DIAGNOSIS — J43.9 PULMONARY EMPHYSEMA, UNSPECIFIED EMPHYSEMA TYPE (HCC): ICD-10-CM

## 2018-09-20 DIAGNOSIS — G89.4 CHRONIC PAIN SYNDROME: ICD-10-CM

## 2018-09-20 DIAGNOSIS — M19.90 OSTEOARTHRITIS, UNSPECIFIED OSTEOARTHRITIS TYPE, UNSPECIFIED SITE: Primary | ICD-10-CM

## 2018-09-20 PROBLEM — G47.30 SLEEP APNEA: Status: ACTIVE | Noted: 2017-02-17

## 2018-09-20 PROCEDURE — 99342 HOME/RES VST NEW LOW MDM 30: CPT | Performed by: FAMILY MEDICINE

## 2018-09-20 RX ORDER — CELECOXIB 200 MG/1
200 CAPSULE ORAL DAILY
Qty: 90 CAPSULE | Refills: 3 | Status: SHIPPED | OUTPATIENT
Start: 2018-09-20 | End: 2018-09-26 | Stop reason: SDUPTHER

## 2018-09-26 DIAGNOSIS — M19.90 OSTEOARTHRITIS, UNSPECIFIED OSTEOARTHRITIS TYPE, UNSPECIFIED SITE: ICD-10-CM

## 2018-09-26 RX ORDER — CELECOXIB 200 MG/1
200 CAPSULE ORAL DAILY
Qty: 30 CAPSULE | Refills: 0 | Status: SHIPPED | OUTPATIENT
Start: 2018-09-26 | End: 2019-09-18 | Stop reason: SDUPTHER

## 2018-09-27 NOTE — PROGRESS NOTES
Subjective:     Patient ID: Ricardo Manriquez is a 80 y o  female  HPI     Luisa Pope is an 79 yo female who is having a home visit to establish care with a new physician  She has a past medical history significant for muscular dystrophy, morbid obesity, OA, chronic pain, PORTIA and essential HTN  She was a previous patient of Dr Rosa Hughes before moving out of [facility]  She feels well today and has no complaints except for some intermittent reflux symptoms  She is not on a PPI or other acid blocker  She has had lower extremity swelling in the past, but takes lasix 20 mg daily and has not had any problems with pain or swelling for a long time now  The patient is sedentary and primarily bed bound, and has a bedroom on the ground floor of her family home  She is cared for by her 7 children, including a daughter who works within [employer] as a nurse who is present for our exam today  She also receives visits from home care nurses  Her only requests today are for a flu shot, which we do not yet have in office, and a refill on her celebrex  Her and her daughter report that the patient's blood sugars have been well controlled, and her blood pressure has been in normal range  Overall she feels well and denies CP, SOB, HA, N/V/D, palpitations, lightheadedness, dizziness, fevers, chills, abdominal pain, or /GI symptoms  Pertinent Review of Systems as noted above    Objective:     Physical Exam      Gen: A&O x3, NAD, morbidly obese and bedbound  CVS: RRR (+) S1S2  Pulm: CTAB, symmetric  Abd: (+) Ventral hernia  Soft, NT, (+) BS  Obese abdomen  Ext: No edema  2+ pedal pulses  Assessment/Plan:     Encounter to establish care with new doctor    Chronic pain syndrome / Generalized Osteoarthritis  · Stable  Refilled patient's celebrex 200 mg po qd  Muscular dystrophy, limb girdle (HCC)  · Stable  Continue supportive care & pain management  Pulmonary Emphysema  · Stable  No difficulty breathing   No complaints of cough, SOB, wheezing  · Continue to monitor  · Follow up with pulmonology as indicated  Morbid obesity ([address] )  · Stable  Essential hypertension  · Stable  Blood pressures within normal limits  · Continue cardizem, lasix, and toprol xl as previously ordered  · Continue to monitor BP at home  Nicholas Green DO  09/27/18  3:35 PM    Some portions of this record may have been generated with voice recognition software  There may be translation, syntax, or grammatical errors  Occasional wrong word or "sound-a-like" substitutions may have occurred due to the inherent limitations of the voice recognition software  Read the chart carefully and recognize, using context, where substations may have occurred   If you have any questions, please contact the dictating provider for clarification or correction, as needed

## 2018-09-27 NOTE — TELEPHONE ENCOUNTER
Spoke with pt  Daughter Dr Keanu Lance from [address] [place] [address]  is doing home visits for pt  He will refill celebrex

## 2019-01-07 RX ORDER — DILTIAZEM HYDROCHLORIDE 120 MG/1
120 TABLET, FILM COATED ORAL
Qty: 180 TABLET | Refills: 3 | OUTPATIENT
Start: 2019-01-07

## 2019-01-09 ENCOUNTER — TELEPHONE (OUTPATIENT)
Dept: FAMILY MEDICINE CLINIC | Facility: CLINIC | Age: 84
End: 2019-01-09

## 2019-01-09 NOTE — TELEPHONE ENCOUNTER
Patient's daughter called ad would like Dr Dawna Vargas to call back  [facility] [phone]  I sent Dr Dawna Vargas a message

## 2019-01-10 ENCOUNTER — TREATMENT (OUTPATIENT)
Dept: OTHER | Facility: HOSPITAL | Age: 84
End: 2019-01-10
Payer: MEDICARE

## 2019-01-10 DIAGNOSIS — J18.9 PNEUMONIA OF RIGHT LOWER LOBE DUE TO INFECTIOUS ORGANISM: Primary | ICD-10-CM

## 2019-01-10 DIAGNOSIS — G71.09 MUSCULAR DYSTROPHY, LIMB GIRDLE (HCC): ICD-10-CM

## 2019-01-10 DIAGNOSIS — R09.02 HYPOXIA: Primary | ICD-10-CM

## 2019-01-10 DIAGNOSIS — I10 ESSENTIAL HYPERTENSION: ICD-10-CM

## 2019-01-10 DIAGNOSIS — E66.01 MORBID OBESITY (HCC): ICD-10-CM

## 2019-01-10 PROCEDURE — 99348 HOME/RES VST EST LOW MDM 30: CPT | Performed by: FAMILY MEDICINE

## 2019-01-10 RX ORDER — AZITHROMYCIN 500 MG/1
500 TABLET, FILM COATED ORAL DAILY
Qty: 3 TABLET | Refills: 0 | Status: SHIPPED | OUTPATIENT
Start: 2019-01-10 | End: 2019-01-13

## 2019-01-10 NOTE — PROGRESS NOTES
Hypoxia  Likely 2/2 community acquired pneumonia, will tx with azithromycin  If worsening SOB or develops more symptoms, will reassess    Hypertension  Cont current medication regimen and check Bps at home        Subjective     81 yo female w/ PMH of muscular dystrophy, OA, chronic pain, HTN, morbid obesity who was seen during home visit for complaints of decreased O2 sat with lowest saturation at 78% without oxygen  When she uses her O2, her sats are wnl  Pt denies any other complaints, no fevers, chills, nausea, vomiting or diarrhea  Objective     Physical Exam   Constitutional: She appears well-developed and well-nourished  No distress  HENT:   Head: Normocephalic and atraumatic  Cardiovascular: Normal rate and regular rhythm  Pulmonary/Chest: Effort normal  No respiratory distress  She has no wheezes  She has rales  Right   Abdominal: Soft  Bowel sounds are normal    Musculoskeletal:   Bedbound, no edema noted   Neurological: She is alert  Skin: Skin is warm

## 2019-01-10 NOTE — ASSESSMENT & PLAN NOTE
Likely 2/2 community acquired pneumonia, will tx with azithromycin   If worsening SOB or develops more symptoms, will reassess

## 2019-01-14 DIAGNOSIS — R60.0 LOWER EXTREMITY EDEMA: Primary | ICD-10-CM

## 2019-01-14 RX ORDER — FUROSEMIDE 20 MG/1
10 TABLET ORAL EVERY OTHER DAY
Qty: 30 TABLET | Refills: 5 | Status: SHIPPED | OUTPATIENT
Start: 2019-01-14 | End: 2019-03-01 | Stop reason: SDUPTHER

## 2019-01-21 DIAGNOSIS — L98.491 SKIN ULCER, LIMITED TO BREAKDOWN OF SKIN (HCC): Primary | ICD-10-CM

## 2019-01-21 NOTE — TELEPHONE ENCOUNTER
Patient daughter called and reported she has a small bedsore and in the past you ordered Silvadene cream if we can order a small tube to [facility] for patient  She knows to call if wound gets worse

## 2019-01-28 DIAGNOSIS — R00.0 TACHYCARDIA: Primary | ICD-10-CM

## 2019-01-28 RX ORDER — DILTIAZEM HYDROCHLORIDE 120 MG/1
120 TABLET, FILM COATED ORAL
Qty: 60 TABLET | Refills: 5 | Status: SHIPPED | OUTPATIENT
Start: 2019-01-28 | End: 2019-02-21

## 2019-01-28 RX ORDER — DILTIAZEM HYDROCHLORIDE 240 MG/1
240 CAPSULE, COATED, EXTENDED RELEASE ORAL DAILY
Qty: 90 CAPSULE | Refills: 3 | Status: SHIPPED | OUTPATIENT
Start: 2019-01-28 | End: 2020-04-22 | Stop reason: SDUPTHER

## 2019-02-19 ENCOUNTER — TELEPHONE (OUTPATIENT)
Dept: FAMILY MEDICINE CLINIC | Facility: CLINIC | Age: 84
End: 2019-02-19

## 2019-02-21 ENCOUNTER — APPOINTMENT (EMERGENCY)
Dept: RADIOLOGY | Facility: HOSPITAL | Age: 84
DRG: 202 | End: 2019-02-21
Payer: MEDICARE

## 2019-02-21 ENCOUNTER — HOSPITAL ENCOUNTER (INPATIENT)
Facility: HOSPITAL | Age: 84
LOS: 6 days | Discharge: HOME HEALTH | DRG: 202 | End: 2019-02-28
Attending: EMERGENCY MEDICINE | Admitting: FAMILY MEDICINE
Payer: MEDICARE

## 2019-02-21 DIAGNOSIS — J96.21 ACUTE ON CHRONIC RESPIRATORY FAILURE WITH HYPOXIA AND HYPERCAPNIA (HCC): ICD-10-CM

## 2019-02-21 DIAGNOSIS — R09.02 HYPOXEMIA: ICD-10-CM

## 2019-02-21 DIAGNOSIS — R06.89 HYPERCAPNIA: Primary | ICD-10-CM

## 2019-02-21 DIAGNOSIS — I10 ESSENTIAL HYPERTENSION: Chronic | ICD-10-CM

## 2019-02-21 DIAGNOSIS — M99.08 SOMATIC DYSFUNCTION OF CHEST WALL: ICD-10-CM

## 2019-02-21 DIAGNOSIS — E66.01 MORBID OBESITY (HCC): Chronic | ICD-10-CM

## 2019-02-21 DIAGNOSIS — J40 BRONCHITIS: Primary | ICD-10-CM

## 2019-02-21 DIAGNOSIS — J40 TRACHEOBRONCHITIS: ICD-10-CM

## 2019-02-21 DIAGNOSIS — R05.9 COUGH: ICD-10-CM

## 2019-02-21 DIAGNOSIS — J96.22 ACUTE ON CHRONIC RESPIRATORY FAILURE WITH HYPOXIA AND HYPERCAPNIA (HCC): ICD-10-CM

## 2019-02-21 DIAGNOSIS — R06.89 DIFFICULTY BREATHING: ICD-10-CM

## 2019-02-21 LAB
ADDITIONAL SETTING: 0
ANCILLARY VALUES: 0
ARTERIAL PATENCY WRIST A: 0
BASE EXCESS BLDA CALC-SCNC: 9 MMOL/L (ref -2–3)
BASOPHILS # BLD AUTO: 0.03 THOUSANDS/ΜL (ref 0–0.1)
BASOPHILS NFR BLD AUTO: 0 % (ref 0–1)
CA-I BLD-SCNC: 1.2 MMOL/L (ref 1.12–1.32)
DS:DELIVERY SYSTEM: ABNORMAL
EOSINOPHIL # BLD AUTO: 0 THOUSAND/ΜL (ref 0–0.61)
EOSINOPHIL NFR BLD AUTO: 0 % (ref 0–6)
ERYTHROCYTE [DISTWIDTH] IN BLOOD BY AUTOMATED COUNT: 18.4 % (ref 11.6–15.1)
GLUCOSE SERPL-MCNC: 121 MG/DL (ref 65–140)
HCO3 BLDA-SCNC: 37 MMOL/L (ref 22–28)
HCT VFR BLD AUTO: 42.7 % (ref 34.8–46.1)
HCT VFR BLD CALC: 36 % (ref 34.8–46.1)
HGB BLD-MCNC: 13.3 G/DL (ref 11.5–15.4)
HGB BLDA-MCNC: 12.2 G/DL (ref 11.5–15.4)
IMM GRANULOCYTES # BLD AUTO: 0.04 THOUSAND/UL (ref 0–0.2)
IMM GRANULOCYTES NFR BLD AUTO: 0 % (ref 0–2)
LYMPHOCYTES # BLD AUTO: 0.71 THOUSANDS/ΜL (ref 0.6–4.47)
LYMPHOCYTES NFR BLD AUTO: 7 % (ref 14–44)
MCH RBC QN AUTO: 26.6 PG (ref 26.8–34.3)
MCHC RBC AUTO-ENTMCNC: 31.1 G/DL (ref 31.4–37.4)
MCV RBC AUTO: 85 FL (ref 82–98)
MONOCYTES # BLD AUTO: 0.8 THOUSAND/ΜL (ref 0.17–1.22)
MONOCYTES NFR BLD AUTO: 8 % (ref 4–12)
NEUTROPHILS # BLD AUTO: 8.05 THOUSANDS/ΜL (ref 1.85–7.62)
NEUTS SEG NFR BLD AUTO: 85 % (ref 43–75)
NRBC BLD AUTO-RTO: 0 /100 WBCS
PCO2 BLD: 39 MMOL/L (ref 21–32)
PCO2 BLD: 64.5 MM HG (ref 36–44)
PCO2 BLD: 86 MM HG
PCO2 BLDA: 63 MM HG
PH BLD: 7.37 [PH]
PH BLD: 7.37 [PH] (ref 7.35–7.45)
PLATELET # BLD AUTO: 213 THOUSANDS/UL (ref 149–390)
PMV BLD AUTO: 9.3 FL (ref 8.9–12.7)
PO2 BLD: 89 MM HG (ref 75–129)
POTASSIUM BLD-SCNC: 4 MMOL/L (ref 3.5–5.3)
PRESSURE SETTING: 0
RBC # BLD AUTO: 5 MILLION/UL (ref 3.81–5.12)
RESPIRATORY RATE: 0
SAMPLE SITE: 0
SAO2 % BLD FROM PO2: 96 % (ref 95–98)
SODIUM BLD-SCNC: 124 MMOL/L (ref 136–145)
SPECIMEN SOURCE: ABNORMAL
VENT TYPE: 0
VENTILATION VALUE: 2
WBC # BLD AUTO: 9.63 THOUSAND/UL (ref 4.31–10.16)

## 2019-02-21 PROCEDURE — 82330 ASSAY OF CALCIUM: CPT

## 2019-02-21 PROCEDURE — 71045 X-RAY EXAM CHEST 1 VIEW: CPT

## 2019-02-21 PROCEDURE — 82947 ASSAY GLUCOSE BLOOD QUANT: CPT

## 2019-02-21 PROCEDURE — 84132 ASSAY OF SERUM POTASSIUM: CPT

## 2019-02-21 PROCEDURE — 93005 ELECTROCARDIOGRAM TRACING: CPT

## 2019-02-21 PROCEDURE — 85014 HEMATOCRIT: CPT

## 2019-02-21 PROCEDURE — 36415 COLL VENOUS BLD VENIPUNCTURE: CPT | Performed by: EMERGENCY MEDICINE

## 2019-02-21 PROCEDURE — 99285 EMERGENCY DEPT VISIT HI MDM: CPT

## 2019-02-21 PROCEDURE — 82803 BLOOD GASES ANY COMBINATION: CPT

## 2019-02-21 PROCEDURE — 84295 ASSAY OF SERUM SODIUM: CPT

## 2019-02-21 PROCEDURE — 36600 WITHDRAWAL OF ARTERIAL BLOOD: CPT

## 2019-02-21 PROCEDURE — 94640 AIRWAY INHALATION TREATMENT: CPT

## 2019-02-21 PROCEDURE — 85025 COMPLETE CBC W/AUTO DIFF WBC: CPT | Performed by: EMERGENCY MEDICINE

## 2019-02-21 PROCEDURE — 1124F ACP DISCUSS-NO DSCNMKR DOCD: CPT | Performed by: INTERNAL MEDICINE

## 2019-02-21 RX ORDER — ALBUTEROL SULFATE 2.5 MG/3ML
2.5 SOLUTION RESPIRATORY (INHALATION) ONCE
Status: COMPLETED | OUTPATIENT
Start: 2019-02-21 | End: 2019-02-21

## 2019-02-21 RX ORDER — AZITHROMYCIN 500 MG/1
500 TABLET, FILM COATED ORAL DAILY
Qty: 5 TABLET | Refills: 0 | Status: SHIPPED | OUTPATIENT
Start: 2019-02-21 | End: 2019-02-28 | Stop reason: HOSPADM

## 2019-02-21 RX ADMIN — ALBUTEROL SULFATE 2.5 MG: 2.5 SOLUTION RESPIRATORY (INHALATION) at 23:38

## 2019-02-22 ENCOUNTER — APPOINTMENT (INPATIENT)
Dept: NON INVASIVE DIAGNOSTICS | Facility: HOSPITAL | Age: 84
DRG: 202 | End: 2019-02-22
Payer: MEDICARE

## 2019-02-22 PROBLEM — G47.33 OSA TREATED WITH BIPAP: Chronic | Status: ACTIVE | Noted: 2017-02-17

## 2019-02-22 PROBLEM — J96.22 ACUTE ON CHRONIC RESPIRATORY FAILURE WITH HYPOXIA AND HYPERCAPNIA (HCC): Status: ACTIVE | Noted: 2019-02-22

## 2019-02-22 PROBLEM — G47.30 SLEEP APNEA: Chronic | Status: ACTIVE | Noted: 2017-02-17

## 2019-02-22 PROBLEM — I50.32 CHRONIC DIASTOLIC HEART FAILURE (HCC): Status: ACTIVE | Noted: 2019-02-22

## 2019-02-22 PROBLEM — G47.33 OSA TREATED WITH BIPAP: Status: ACTIVE | Noted: 2017-02-17

## 2019-02-22 PROBLEM — J96.21 ACUTE ON CHRONIC RESPIRATORY FAILURE WITH HYPOXIA AND HYPERCAPNIA (HCC): Status: ACTIVE | Noted: 2019-02-22

## 2019-02-22 PROBLEM — E87.1 HYPONATREMIA: Status: ACTIVE | Noted: 2019-02-22

## 2019-02-22 PROBLEM — I50.32 CHRONIC DIASTOLIC HEART FAILURE (HCC): Chronic | Status: ACTIVE | Noted: 2019-02-22

## 2019-02-22 PROBLEM — I10 ESSENTIAL HYPERTENSION: Chronic | Status: ACTIVE | Noted: 2017-01-13

## 2019-02-22 LAB
ALBUMIN SERPL BCP-MCNC: 2.7 G/DL (ref 3.5–5)
ALBUMIN SERPL BCP-MCNC: 2.8 G/DL (ref 3.5–5)
ALP SERPL-CCNC: 104 U/L (ref 46–116)
ALP SERPL-CCNC: 108 U/L (ref 46–116)
ALT SERPL W P-5'-P-CCNC: 22 U/L (ref 12–78)
ALT SERPL W P-5'-P-CCNC: 23 U/L (ref 12–78)
ANION GAP SERPL CALCULATED.3IONS-SCNC: -1 MMOL/L (ref 4–13)
ANION GAP SERPL CALCULATED.3IONS-SCNC: 3 MMOL/L (ref 4–13)
APTT PPP: 28 SECONDS (ref 24–33)
ARTERIAL PATENCY WRIST A: YES
ARTERIAL PATENCY WRIST A: YES
AST SERPL W P-5'-P-CCNC: 13 U/L (ref 5–45)
AST SERPL W P-5'-P-CCNC: 16 U/L (ref 5–45)
BACTERIA UR QL AUTO: ABNORMAL /HPF
BASE EXCESS BLDA CALC-SCNC: 6.6 MMOL/L
BASE EXCESS BLDA CALC-SCNC: 7.2 MMOL/L
BASE EXCESS BLDA CALC-SCNC: 8.6 MMOL/L
BASOPHILS # BLD AUTO: 0.02 THOUSANDS/ΜL (ref 0–0.1)
BASOPHILS NFR BLD AUTO: 0 % (ref 0–1)
BILIRUB SERPL-MCNC: 0.2 MG/DL (ref 0.2–1)
BILIRUB SERPL-MCNC: 0.3 MG/DL (ref 0.2–1)
BILIRUB UR QL STRIP: NEGATIVE
BODY TEMPERATURE: 97.9 DEGREES FEHRENHEIT
BODY TEMPERATURE: 98.2 DEGREES FEHRENHEIT
BODY TEMPERATURE: 98.8 DEGREES FEHRENHEIT
BUN SERPL-MCNC: 10 MG/DL (ref 5–25)
BUN SERPL-MCNC: 11 MG/DL (ref 5–25)
CALCIUM SERPL-MCNC: 8.8 MG/DL (ref 8.3–10.1)
CALCIUM SERPL-MCNC: 8.8 MG/DL (ref 8.3–10.1)
CHLORIDE SERPL-SCNC: 92 MMOL/L (ref 100–108)
CHLORIDE SERPL-SCNC: 93 MMOL/L (ref 100–108)
CLARITY UR: CLEAR
CO2 SERPL-SCNC: 36 MMOL/L (ref 21–32)
CO2 SERPL-SCNC: 38 MMOL/L (ref 21–32)
COLOR UR: YELLOW
CREAT SERPL-MCNC: 0.25 MG/DL (ref 0.6–1.3)
CREAT SERPL-MCNC: 0.27 MG/DL (ref 0.6–1.3)
EOSINOPHIL # BLD AUTO: 0 THOUSAND/ΜL (ref 0–0.61)
EOSINOPHIL NFR BLD AUTO: 0 % (ref 0–6)
ERYTHROCYTE [DISTWIDTH] IN BLOOD BY AUTOMATED COUNT: 14.6 % (ref 11.6–15.1)
FLUAV AG SPEC QL: NOT DETECTED
FLUBV AG SPEC QL: NOT DETECTED
GFR SERPL CREATININE-BSD FRML MDRD: 107 ML/MIN/1.73SQ M
GFR SERPL CREATININE-BSD FRML MDRD: 110 ML/MIN/1.73SQ M
GLUCOSE SERPL-MCNC: 108 MG/DL (ref 65–140)
GLUCOSE SERPL-MCNC: 113 MG/DL (ref 65–140)
GLUCOSE SERPL-MCNC: 156 MG/DL (ref 65–140)
GLUCOSE UR STRIP-MCNC: NEGATIVE MG/DL
HCO3 BLDA-SCNC: 34.3 MMOL/L (ref 22–28)
HCO3 BLDA-SCNC: 35.1 MMOL/L (ref 22–28)
HCO3 BLDA-SCNC: 38.5 MMOL/L (ref 22–28)
HCT VFR BLD AUTO: 43.2 % (ref 34.8–46.1)
HGB BLD-MCNC: 12.9 G/DL (ref 11.5–15.4)
HGB UR QL STRIP.AUTO: ABNORMAL
IMM GRANULOCYTES # BLD AUTO: 0.05 THOUSAND/UL (ref 0–0.2)
IMM GRANULOCYTES NFR BLD AUTO: 1 % (ref 0–2)
INR PPP: 1.01 (ref 0.86–1.16)
IPAP: 12
KETONES UR STRIP-MCNC: ABNORMAL MG/DL
L PNEUMO1 AG UR QL IA.RAPID: NEGATIVE
LEUKOCYTE ESTERASE UR QL STRIP: NEGATIVE
LYMPHOCYTES # BLD AUTO: 0.73 THOUSANDS/ΜL (ref 0.6–4.47)
LYMPHOCYTES NFR BLD AUTO: 8 % (ref 14–44)
MAGNESIUM SERPL-MCNC: 1.8 MG/DL (ref 1.6–2.6)
MCH RBC QN AUTO: 26.7 PG (ref 26.8–34.3)
MCHC RBC AUTO-ENTMCNC: 29.9 G/DL (ref 31.4–37.4)
MCV RBC AUTO: 89 FL (ref 82–98)
MONOCYTES # BLD AUTO: 0.42 THOUSAND/ΜL (ref 0.17–1.22)
MONOCYTES NFR BLD AUTO: 5 % (ref 4–12)
NEUTROPHILS # BLD AUTO: 7.92 THOUSANDS/ΜL (ref 1.85–7.62)
NEUTS SEG NFR BLD AUTO: 86 % (ref 43–75)
NITRITE UR QL STRIP: POSITIVE
NON VENT- BIPAP: ABNORMAL
NON-SQ EPI CELLS URNS QL MICRO: ABNORMAL /HPF
NRBC BLD AUTO-RTO: 0 /100 WBCS
NT-PROBNP SERPL-MCNC: 873 PG/ML
O2 CT BLDA-SCNC: 16.9 ML/DL (ref 16–23)
O2 CT BLDA-SCNC: 18 ML/DL (ref 16–23)
O2 CT BLDA-SCNC: 18.5 ML/DL (ref 16–23)
OTHER FIO2: 25 %
OXYHGB MFR BLDA: 91.5 % (ref 94–97)
OXYHGB MFR BLDA: 93.6 % (ref 94–97)
OXYHGB MFR BLDA: 95.6 % (ref 94–97)
PCO2 BLDA: 59.1 MM HG (ref 36–44)
PCO2 BLDA: 70.7 MM HG (ref 36–44)
PCO2 BLDA: 82 MM HG (ref 36–44)
PCO2 TEMP ADJ BLDA: 59.4 MM HG (ref 36–44)
PCO2 TEMP ADJ BLDA: 70.1 MM HG (ref 36–44)
PCO2 TEMP ADJ BLDA: 80.6 MM HG (ref 36–44)
PEEP MAX SETTING VENT: 6 CM[H2O]
PH BLD: 7.29 [PH] (ref 7.35–7.45)
PH BLD: 7.32 [PH] (ref 7.35–7.45)
PH BLD: 7.38 [PH] (ref 7.35–7.45)
PH BLDA: 7.29 [PH] (ref 7.35–7.45)
PH BLDA: 7.31 [PH] (ref 7.35–7.45)
PH BLDA: 7.38 [PH] (ref 7.35–7.45)
PH UR STRIP.AUTO: 5 [PH] (ref 5–9)
PHOSPHATE SERPL-MCNC: 2.7 MG/DL (ref 2.3–4.1)
PLATELET # BLD AUTO: 210 THOUSANDS/UL (ref 149–390)
PMV BLD AUTO: 9.1 FL (ref 8.9–12.7)
PO2 BLD: 65 MM HG (ref 75–129)
PO2 BLD: 72.2 MM HG (ref 75–129)
PO2 BLD: 89.3 MM HG (ref 75–129)
PO2 BLDA: 65.9 MM HG (ref 75–129)
PO2 BLDA: 71.7 MM HG (ref 75–129)
PO2 BLDA: 91.6 MM HG (ref 75–129)
POTASSIUM SERPL-SCNC: 4.5 MMOL/L (ref 3.5–5.3)
POTASSIUM SERPL-SCNC: 4.6 MMOL/L (ref 3.5–5.3)
PROCALCITONIN SERPL-MCNC: <0.05 NG/ML
PROT SERPL-MCNC: 6.7 G/DL (ref 6.4–8.2)
PROT SERPL-MCNC: 6.8 G/DL (ref 6.4–8.2)
PROT UR STRIP-MCNC: ABNORMAL MG/DL
PROTHROMBIN TIME: 10.6 SECONDS (ref 9.4–11.7)
RBC # BLD AUTO: 4.84 MILLION/UL (ref 3.81–5.12)
RBC #/AREA URNS AUTO: ABNORMAL /HPF
RSV B RNA SPEC QL NAA+PROBE: NOT DETECTED
S PNEUM AG UR QL: NEGATIVE
SODIUM SERPL-SCNC: 129 MMOL/L (ref 136–145)
SODIUM SERPL-SCNC: 132 MMOL/L (ref 136–145)
SP GR UR STRIP.AUTO: 1.01 (ref 1–1.03)
SPECIMEN SOURCE: ABNORMAL
TROPONIN I SERPL-MCNC: <0.02 NG/ML
UROBILINOGEN UR QL STRIP.AUTO: 0.2 E.U./DL
VENT BIPAP FIO2: 40 %
WBC # BLD AUTO: 9.14 THOUSAND/UL (ref 4.31–10.16)
WBC #/AREA URNS AUTO: ABNORMAL /HPF

## 2019-02-22 PROCEDURE — 82948 REAGENT STRIP/BLOOD GLUCOSE: CPT

## 2019-02-22 PROCEDURE — 84484 ASSAY OF TROPONIN QUANT: CPT | Performed by: EMERGENCY MEDICINE

## 2019-02-22 PROCEDURE — 99233 SBSQ HOSP IP/OBS HIGH 50: CPT | Performed by: PHYSICIAN ASSISTANT

## 2019-02-22 PROCEDURE — 36410 VNPNXR 3YR/> PHY/QHP DX/THER: CPT | Performed by: RADIOLOGY

## 2019-02-22 PROCEDURE — 85730 THROMBOPLASTIN TIME PARTIAL: CPT | Performed by: EMERGENCY MEDICINE

## 2019-02-22 PROCEDURE — 36600 WITHDRAWAL OF ARTERIAL BLOOD: CPT

## 2019-02-22 PROCEDURE — 87081 CULTURE SCREEN ONLY: CPT | Performed by: FAMILY MEDICINE

## 2019-02-22 PROCEDURE — 94660 CPAP INITIATION&MGMT: CPT

## 2019-02-22 PROCEDURE — 87449 NOS EACH ORGANISM AG IA: CPT | Performed by: FAMILY MEDICINE

## 2019-02-22 PROCEDURE — C1751 CATH, INF, PER/CENT/MIDLINE: HCPCS

## 2019-02-22 PROCEDURE — 82805 BLOOD GASES W/O2 SATURATION: CPT | Performed by: STUDENT IN AN ORGANIZED HEALTH CARE EDUCATION/TRAINING PROGRAM

## 2019-02-22 PROCEDURE — 99222 1ST HOSP IP/OBS MODERATE 55: CPT | Performed by: FAMILY MEDICINE

## 2019-02-22 PROCEDURE — 87631 RESP VIRUS 3-5 TARGETS: CPT | Performed by: FAMILY MEDICINE

## 2019-02-22 PROCEDURE — 83735 ASSAY OF MAGNESIUM: CPT | Performed by: FAMILY MEDICINE

## 2019-02-22 PROCEDURE — 05HY33Z INSERTION OF INFUSION DEVICE INTO UPPER VEIN, PERCUTANEOUS APPROACH: ICD-10-PCS | Performed by: RADIOLOGY

## 2019-02-22 PROCEDURE — 94664 DEMO&/EVAL PT USE INHALER: CPT

## 2019-02-22 PROCEDURE — 82805 BLOOD GASES W/O2 SATURATION: CPT | Performed by: FAMILY MEDICINE

## 2019-02-22 PROCEDURE — 85610 PROTHROMBIN TIME: CPT | Performed by: EMERGENCY MEDICINE

## 2019-02-22 PROCEDURE — 84145 PROCALCITONIN (PCT): CPT | Performed by: STUDENT IN AN ORGANIZED HEALTH CARE EDUCATION/TRAINING PROGRAM

## 2019-02-22 PROCEDURE — 36573 INSJ PICC RS&I 5 YR+: CPT

## 2019-02-22 PROCEDURE — 94760 N-INVAS EAR/PLS OXIMETRY 1: CPT

## 2019-02-22 PROCEDURE — 80053 COMPREHEN METABOLIC PANEL: CPT | Performed by: EMERGENCY MEDICINE

## 2019-02-22 PROCEDURE — 85025 COMPLETE CBC W/AUTO DIFF WBC: CPT | Performed by: FAMILY MEDICINE

## 2019-02-22 PROCEDURE — 80053 COMPREHEN METABOLIC PANEL: CPT | Performed by: FAMILY MEDICINE

## 2019-02-22 PROCEDURE — 76937 US GUIDE VASCULAR ACCESS: CPT | Performed by: RADIOLOGY

## 2019-02-22 PROCEDURE — 87633 RESP VIRUS 12-25 TARGETS: CPT | Performed by: PHYSICIAN ASSISTANT

## 2019-02-22 PROCEDURE — 99222 1ST HOSP IP/OBS MODERATE 55: CPT | Performed by: INTERNAL MEDICINE

## 2019-02-22 PROCEDURE — 94640 AIRWAY INHALATION TREATMENT: CPT

## 2019-02-22 PROCEDURE — 83880 ASSAY OF NATRIURETIC PEPTIDE: CPT | Performed by: EMERGENCY MEDICINE

## 2019-02-22 PROCEDURE — 36415 COLL VENOUS BLD VENIPUNCTURE: CPT | Performed by: EMERGENCY MEDICINE

## 2019-02-22 PROCEDURE — 81001 URINALYSIS AUTO W/SCOPE: CPT | Performed by: FAMILY MEDICINE

## 2019-02-22 PROCEDURE — 84100 ASSAY OF PHOSPHORUS: CPT | Performed by: FAMILY MEDICINE

## 2019-02-22 RX ORDER — LIDOCAINE HYDROCHLORIDE 10 MG/ML
INJECTION, SOLUTION INFILTRATION; PERINEURAL CODE/TRAUMA/SEDATION MEDICATION
Status: COMPLETED | OUTPATIENT
Start: 2019-02-22 | End: 2019-02-22

## 2019-02-22 RX ORDER — FUROSEMIDE 20 MG/1
10 TABLET ORAL EVERY OTHER DAY
Status: DISCONTINUED | OUTPATIENT
Start: 2019-02-22 | End: 2019-02-26

## 2019-02-22 RX ORDER — METOPROLOL SUCCINATE 25 MG/1
25 TABLET, EXTENDED RELEASE ORAL 2 TIMES DAILY
Status: DISCONTINUED | OUTPATIENT
Start: 2019-02-22 | End: 2019-02-28 | Stop reason: HOSPADM

## 2019-02-22 RX ORDER — FUROSEMIDE 10 MG/ML
40 INJECTION INTRAMUSCULAR; INTRAVENOUS ONCE
Status: COMPLETED | OUTPATIENT
Start: 2019-02-22 | End: 2019-02-22

## 2019-02-22 RX ORDER — NYSTATIN 100000 [USP'U]/G
POWDER TOPICAL 4 TIMES DAILY
Status: DISCONTINUED | OUTPATIENT
Start: 2019-02-22 | End: 2019-02-28 | Stop reason: HOSPADM

## 2019-02-22 RX ORDER — LEVALBUTEROL 1.25 MG/.5ML
1.25 SOLUTION, CONCENTRATE RESPIRATORY (INHALATION)
Status: DISCONTINUED | OUTPATIENT
Start: 2019-02-22 | End: 2019-02-22

## 2019-02-22 RX ORDER — METHYLPREDNISOLONE SODIUM SUCCINATE 125 MG/2ML
60 INJECTION, POWDER, LYOPHILIZED, FOR SOLUTION INTRAMUSCULAR; INTRAVENOUS EVERY 12 HOURS SCHEDULED
Status: DISCONTINUED | OUTPATIENT
Start: 2019-02-22 | End: 2019-02-24

## 2019-02-22 RX ORDER — LEVALBUTEROL 1.25 MG/.5ML
0.63 SOLUTION, CONCENTRATE RESPIRATORY (INHALATION)
Status: DISCONTINUED | OUTPATIENT
Start: 2019-02-22 | End: 2019-02-23

## 2019-02-22 RX ORDER — IPRATROPIUM BROMIDE AND ALBUTEROL SULFATE 2.5; .5 MG/3ML; MG/3ML
3 SOLUTION RESPIRATORY (INHALATION) EVERY 2 HOUR PRN
Status: DISCONTINUED | OUTPATIENT
Start: 2019-02-22 | End: 2019-02-22

## 2019-02-22 RX ORDER — IPRATROPIUM BROMIDE AND ALBUTEROL SULFATE 2.5; .5 MG/3ML; MG/3ML
3 SOLUTION RESPIRATORY (INHALATION)
Status: DISCONTINUED | OUTPATIENT
Start: 2019-02-22 | End: 2019-02-22

## 2019-02-22 RX ORDER — DILTIAZEM HYDROCHLORIDE 240 MG/1
240 CAPSULE, COATED, EXTENDED RELEASE ORAL DAILY
Status: DISCONTINUED | OUTPATIENT
Start: 2019-02-22 | End: 2019-02-28 | Stop reason: HOSPADM

## 2019-02-22 RX ORDER — SODIUM CHLORIDE 9 MG/ML
50 INJECTION, SOLUTION INTRAVENOUS CONTINUOUS
Status: DISCONTINUED | OUTPATIENT
Start: 2019-02-22 | End: 2019-02-22

## 2019-02-22 RX ORDER — ACETYLCYSTEINE 200 MG/ML
3 SOLUTION ORAL; RESPIRATORY (INHALATION)
Status: DISCONTINUED | OUTPATIENT
Start: 2019-02-23 | End: 2019-02-23

## 2019-02-22 RX ORDER — BENZONATATE 100 MG/1
100 CAPSULE ORAL 3 TIMES DAILY PRN
Status: DISCONTINUED | OUTPATIENT
Start: 2019-02-22 | End: 2019-02-23

## 2019-02-22 RX ORDER — ACETAMINOPHEN 325 MG/1
650 TABLET ORAL EVERY 6 HOURS PRN
Status: DISCONTINUED | OUTPATIENT
Start: 2019-02-22 | End: 2019-02-28 | Stop reason: HOSPADM

## 2019-02-22 RX ORDER — LEVALBUTEROL INHALATION SOLUTION 0.63 MG/3ML
0.63 SOLUTION RESPIRATORY (INHALATION) EVERY 4 HOURS PRN
Status: DISCONTINUED | OUTPATIENT
Start: 2019-02-22 | End: 2019-02-23

## 2019-02-22 RX ADMIN — METHYLPREDNISOLONE SODIUM SUCCINATE 60 MG: 125 INJECTION, POWDER, FOR SOLUTION INTRAMUSCULAR; INTRAVENOUS at 02:56

## 2019-02-22 RX ADMIN — METHYLPREDNISOLONE SODIUM SUCCINATE 60 MG: 125 INJECTION, POWDER, FOR SOLUTION INTRAMUSCULAR; INTRAVENOUS at 21:18

## 2019-02-22 RX ADMIN — NYSTATIN: 100000 POWDER TOPICAL at 22:04

## 2019-02-22 RX ADMIN — SODIUM CHLORIDE 50 ML/HR: 0.9 INJECTION, SOLUTION INTRAVENOUS at 02:59

## 2019-02-22 RX ADMIN — METOPROLOL SUCCINATE 25 MG: 25 TABLET, EXTENDED RELEASE ORAL at 11:10

## 2019-02-22 RX ADMIN — ACETAMINOPHEN 650 MG: 325 TABLET, FILM COATED ORAL at 07:00

## 2019-02-22 RX ADMIN — SODIUM CHLORIDE 50 ML/HR: 0.9 INJECTION, SOLUTION INTRAVENOUS at 11:02

## 2019-02-22 RX ADMIN — IPRATROPIUM BROMIDE AND ALBUTEROL SULFATE 3 ML: 2.5; .5 SOLUTION RESPIRATORY (INHALATION) at 15:02

## 2019-02-22 RX ADMIN — DILTIAZEM HYDROCHLORIDE 240 MG: 240 CAPSULE, COATED, EXTENDED RELEASE ORAL at 11:16

## 2019-02-22 RX ADMIN — FUROSEMIDE 40 MG: 10 INJECTION, SOLUTION INTRAMUSCULAR; INTRAVENOUS at 14:02

## 2019-02-22 RX ADMIN — METOPROLOL SUCCINATE 25 MG: 25 TABLET, EXTENDED RELEASE ORAL at 18:01

## 2019-02-22 RX ADMIN — IPRATROPIUM BROMIDE AND ALBUTEROL SULFATE 3 ML: 2.5; .5 SOLUTION RESPIRATORY (INHALATION) at 07:09

## 2019-02-22 RX ADMIN — AZITHROMYCIN MONOHYDRATE 500 MG: 500 INJECTION, POWDER, LYOPHILIZED, FOR SOLUTION INTRAVENOUS at 05:45

## 2019-02-22 RX ADMIN — ACETAMINOPHEN 650 MG: 325 TABLET, FILM COATED ORAL at 18:03

## 2019-02-22 RX ADMIN — METHYLPREDNISOLONE SODIUM SUCCINATE 60 MG: 125 INJECTION, POWDER, FOR SOLUTION INTRAMUSCULAR; INTRAVENOUS at 11:07

## 2019-02-22 RX ADMIN — IPRATROPIUM BROMIDE 0.5 MG: 0.5 SOLUTION RESPIRATORY (INHALATION) at 20:55

## 2019-02-22 RX ADMIN — ENOXAPARIN SODIUM 40 MG: 40 INJECTION SUBCUTANEOUS at 11:09

## 2019-02-22 RX ADMIN — IPRATROPIUM BROMIDE AND ALBUTEROL SULFATE 3 ML: 2.5; .5 SOLUTION RESPIRATORY (INHALATION) at 03:26

## 2019-02-22 RX ADMIN — LEVALBUTEROL HYDROCHLORIDE 0.63 MG: 1.25 SOLUTION, CONCENTRATE RESPIRATORY (INHALATION) at 20:55

## 2019-02-22 RX ADMIN — LIDOCAINE HYDROCHLORIDE 2 ML: 10 INJECTION, SOLUTION INFILTRATION; PERINEURAL at 17:02

## 2019-02-22 RX ADMIN — FUROSEMIDE 10 MG: 20 TABLET ORAL at 11:16

## 2019-02-22 NOTE — PROCEDURES
PICC Line Insertion  Date/Time: 2/22/2019 5:10 PM  Performed by: Negin Griggs DO  Authorized by: Donita Olivas DO     Patient location:  Bedside  Consent:     Consent obtained:  Written    Consent given by:  Healthcare agent    Risks discussed:  Bleeding and infection  Universal protocol:     Procedure explained and questions answered to patient or proxy's satisfaction: yes      Relevant documents present and verified: yes      Radiology Images displayed and confirmed  If images not available, report reviewed: yes      Site/side marked: yes      Immediately prior to procedure, a time out was called: yes      Patient identity confirmed:  Verbally with patient and arm band  Pre-procedure details:     Hand hygiene: Hand hygiene performed prior to insertion      Sterile barrier technique: All elements of maximal sterile technique followed      Skin preparation:  ChloraPrep    Skin preparation agent: Skin preparation agent completely dried prior to procedure    Indications:     PICC line indications: no peripheral vascular access    Anesthesia (see MAR for exact dosages): Anesthesia method:  Local infiltration    Local anesthetic:  Lidocaine 1% w/o epi  Procedure details:     Location:  Basilic    Vessel type: vein      Laterality:  Right    Approach: percutaneous technique used      Patient position:  Flat    Procedural supplies:  Double lumen    Catheter size:  5 5 Fr    Landmarks identified: yes      Ultrasound guidance: yes      Sterile ultrasound techniques: Sterile gel and sterile probe covers were used      Number of attempts:  1    Successful placement: yes      Total catheter length (cm):  12 5    Catheter out on skin (cm):  0    Arm circumference:  43  Post-procedure details:     Post-procedure:  Dressing applied and securement device placed    Assessment:  Blood return through all ports    Post-procedure complications: none      Patient tolerance of procedure:   Tolerated well, no immediate complications    Observer: Yes      Observer name:  Erlinda Matthews RN

## 2019-02-22 NOTE — ASSESSMENT & PLAN NOTE
· Stable during hospitalization  /66 prior to discharge  Patient was followed by Cardiology during hospitalization  Patient was given Toprol XL 25mg PO BID & Cardizem 240mg PO qd  Patient was initially given Lasix 10 mg every other day, but was increased to 40 mg daily  Patient will be discharged home on Lasix 20 mg daily  Vitals were monitored regularly  ECHO 2/26/19: EF 55-60%

## 2019-02-22 NOTE — ASSESSMENT & PLAN NOTE
· Decreased sodium level initially, which resolved  Na+ remained stable at 137   BMP was monitored daily

## 2019-02-22 NOTE — RESPIRATORY THERAPY NOTE
RT Protocol Note  Isabela West 80 y o  female MRN: [id number]  Unit/Bed#: [address] [place] [address] Encounter: [id number]    Assessment    Principal Problem:    Acute on chronic respiratory failure with hypoxia and hypercapnia ([facility])  Active Problems:    Generalized osteoarthritis    Hypertension    Morbid obesity ([facility] )    Muscular dystrophy, limb girdle    Chronic diastolic heart failure ([facility])    Hyponatremia      Home Pulmonary Medications:  Albuterol inhaler  Home Devices/Therapy: BiPAP/CPAP, Home O2    Past Medical History:   Diagnosis Date    Arthritis     CHF (congestive heart failure) ([facility])     Hypertension     Morbid obesity ([facility] )     Muscular dystrophy      Social History     Socioeconomic History    Marital status:       Spouse name: Not on file    Number of children: Not on file    Years of education: Not on file    Highest education level: Not on file   Occupational History    Not on file   Social Needs    Financial resource strain: Not on file    Food insecurity:     Worry: Not on file     Inability: Not on file    Transportation needs:     Medical: Not on file     Non-medical: Not on file   Tobacco Use    Smoking status: Never Smoker    Smokeless tobacco: Never Used   Substance and Sexual Activity    Alcohol use: No     Alcohol/week: 0 0 oz     Frequency: Never     Drinks per session: Patient refused     Binge frequency: Never    Drug use: No    Sexual activity: Never   Lifestyle    Physical activity:     Days per week: Not on file     Minutes per session: Not on file    Stress: Not on file   Relationships    Social connections:     Talks on phone: Not on file     Gets together: Not on file     Attends [employer] service: Not on file     Active member of club or organization: Not on file     Attends meetings of clubs or organizations: Not on file     Relationship status: Not on file    Intimate partner violence:     Fear of current or ex partner: Not on file     Emotionally abused: Not on file     Physically abused: Not on file     Forced sexual activity: Not on file   Other Topics Concern    Not on file   Social History Narrative    Not on file       Subjective         Objective    Physical Exam:   Assessment Type: Pre-treatment  General Appearance: Sleeping  Respiratory Pattern: Normal  Chest Assessment: Chest expansion symmetrical  Bilateral Breath Sounds: Diminished, Expiratory wheezes  Cough: Unable to assess    Vitals:  Blood pressure (!) 153/101, pulse 87, temperature 98 8 °F (37 1 °C), temperature source Oral, resp  rate 21, height 5' 7" (1 702 m), weight 103 kg (226 lb 6 6 oz), SpO2 98 %, not currently breastfeeding      Results from last 7 days   Lab Units 02/21/19  2323   MANDO TEST  000                 Plan    Respiratory Plan: Home Bronchodilator Patient pathway        Resp Comments: pt sleeping on bipap Yes

## 2019-02-22 NOTE — CONSULTS
Consultation - Pulmonary Medicine   Kwan Gonzalez 80 y o  female MRN: [id number]  Unit/Bed#: ICU 08 Encounter: [id number]      Assessment:  Acute on chronic hypercapnic hypoxemic respiratory failure likely triggered by acute bronchitis  Muscular dystrophy and for which patient is bedridden  Obesity alveolar hypoventilation  Patient noted to have some wheezing yesterday on admission likely due to her acute bronchitis  History of hypertension  History of diastolic cardiac dysfunction  Obesity  History of chronic bronchitis    Plan: Will transfer patient to step-down/ICU for closer monitor and adjust her BiPAP pressures to 17/8 with 25% oxygen  At home patient uses setting of 10/5 with 2 L of oxygen at bedtime  Will use BiPAP intermittently during the day as needed and continue use at nighttime  Continue azithromycin for bronchitis  Continue nebulized treatments with DuoNeb  Patient is on methylprednisone 60 mg IV every 12 hours and could consider decreasing dose tomorrow if patient improves  I spoke with patient's family and spoke with primary medical team      History of Present Illness   Physician Requesting Consult: Aubrey Warren DO  Reason for Consult / Principal Problem:  Acute on chronic hypercapnic hypoxemic respiratory failure  Hx and PE limited by: family provided history    HPI: Kwan Gonzalez is a 80y o  year old female who presents with   History of cough over the past few days  Patient had mostly nonproductive cough  She started experience some lethargy and confusion  She was started on p o  Azithromycin the day of admission but because of worsening confusion and lethargy she was brought to the ER for evaluation  Temperature in ER was 98 1 and respiratory 18 with O2 saturation 98%  Arterial blood gas drawn in ER showed a pH of 7 37, pCO2 65 and PO2 89 on 2 L of oxygen  Patient uses BiPAP at home 1o/5 2 L of oxygen at bedtime and normally is on room air during the day    The BiPAP is used at home for ventilatory support at bedtime  She has underlying muscular dystrophy and obesity alveolar hypoventilation  Portable chest x-ray showed moderate cardiomegaly but no infiltrates  White blood cell count was 9 6 with hemoglobin 13 3 and platelet count 846  No significant shift  BUN was 11 with serum creatinine of 0 25  Last blood gas done April 2018 on 2 L of oxygen showed pH is 7 36 pCO2 46 and PO2 63 mm Hg    This morning patient did eat her breakfast but then afterwards 7 became very lethargic and obtunded  She would not respond to verbal stimuli in very minimal response to noxious stimuli  Blood gas done with her on BiPAP setting of 12/6 with 40% oxygen showed pH is 7 point 3 0, pCO2 81 and PO2 of 91 mm Hg  I did examine the patient and she was very obtunded  We increased the BiPAP to inspiratory pressure 17 expiratory 8 and change oxygen to 25%  O2 saturation over 90%  We spoke to family and made arrangements for patient to be transferred to ICU/stepdown  Patient remains very lethargic and difficult to arouse  When patient was being transferred from her bed to the ICU bed via roller she suddenly became awake with this stimulation and start to converse  Her conversation was appropriate  No focal deficits  Repeat blood gas done later this morning after patient had been on BiPAP 17/8 with 25% oxygen showed pH is 7 38 pCO2 59 and PO2 of 72 mm Hg    Patient is bed ridden  She has history of muscular dystrophy for over 20 years  She has history of chronic diastolic cardiac dysfunction and hypertension  She does use nebulizer with albuterol periodically when needed  She does have element of some chronic bronchitis  She never smoked  No history of any swallow dysfunction    Nasal swab for influenza and RSV is negative    Review of Systems   Constitutional: Positive for fatigue  Negative for fever and unexpected weight change  HENT: Negative for congestion and rhinorrhea      Eyes: Negative for redness  Respiratory: Positive for cough  Negative for wheezing  Cardiovascular: Negative for chest pain and leg swelling  Gastrointestinal: Negative for abdominal distention, abdominal pain, diarrhea and nausea  Endocrine: Negative for polydipsia and polyphagia  Musculoskeletal: Negative for joint swelling  Skin: Negative for rash  Neurological: Negative for weakness  Patient was lethargic at home confused   Psychiatric/Behavioral: Positive for confusion  Historical Information   Past Medical History:   Diagnosis Date    Arthritis     CHF (congestive heart failure) ([facility] )     Hypertension     Morbid obesity ([facility] )     Muscular dystrophy      Past Surgical History:   Procedure Laterality Date    APPENDECTOMY      CHOLECYSTECTOMY      HYSTERECTOMY      THYROID CYST EXCISION      VEIN SURGERY Bilateral      Social History   Social History     Substance and Sexual Activity   Alcohol Use Not Currently    Alcohol/week: 0 0 oz    Frequency: Never    Drinks per session: Patient refused    Binge frequency: Never     Social History     Substance and Sexual Activity   Drug Use No     Social History     Tobacco Use   Smoking Status Never Smoker   Smokeless Tobacco Never Used         Family History: No family history on file      Meds/Allergies     Current Facility-Administered Medications:     acetaminophen (TYLENOL) tablet 650 mg, 650 mg, Oral, Q6H PRN, Manisha Dubose PA-C, 650 mg at 02/22/19 0700    azithromycin (ZITHROMAX) 500 mg in sodium chloride 0 9 % 250 mL IVPB, 500 mg, Intravenous, Q24H, Jennifer Knox PA-C, Stopped at 02/22/19 0645    diltiazem (CARDIZEM CD) 24 hr capsule 240 mg, 240 mg, Oral, Daily, Jennifer Knox PA-C, 240 mg at 02/22/19 1116    enoxaparin (LOVENOX) subcutaneous injection 40 mg, 40 mg, Subcutaneous, Daily, Jennifer Knox PA-C, 40 mg at 02/22/19 1109    furosemide (LASIX) tablet 10 mg, 10 mg, Oral, Every Other Day, Gloria Wong PA-C, 10 mg at 02/22/19 1116    ipratropium-albuterol (DUO-NEB) 0 5-2 5 mg/3 mL inhalation solution 3 mL, 3 mL, Nebulization, Q4H, Jennifer Knox PA-C, 3 mL at 02/22/19 0709    ipratropium-albuterol (DUO-NEB) 0 5-2 5 mg/3 mL inhalation solution 3 mL, 3 mL, Nebulization, Q2H PRN, Gloria Wong PA-C    methylPREDNISolone sodium succinate (Solu-MEDROL) injection 60 mg, 60 mg, Intravenous, Q12H KATHY, Jennifer Knox PA-C, 60 mg at 02/22/19 1107    metoprolol succinate (TOPROL-XL) 24 hr tablet 25 mg, 25 mg, Oral, BID, Jennifer Knox PA-C, 25 mg at 02/22/19 1110    nystatin (MYCOSTATIN) powder, , Topical, 4x Daily, Jennifer Knox PA-C    silver sulfadiazine (SILVADENE,SSD) 1 % cream, , Topical, Daily, Jennifer Knox PA-C    sodium chloride 0 9 % infusion, 50 mL/hr, Intravenous, Continuous, Jennifer Knox PA-C, Last Rate: 50 mL/hr at 02/22/19 1102, 50 mL/hr at 02/22/19 1102    Prior to Admission medications    Medication Sig Start Date End Date Taking?  Authorizing Provider   acetaminophen (TYLENOL) 325 mg tablet Take one or 2 every 6 hours as needed for pain 2/4/16   Darian Hughes DO   albuterol (ACCUNEB) 0 63 MG/3ML nebulizer solution Inhale every 8 (eight) hours 1/27/16   Historical Provider, MD   azithromycin (ZITHROMAX) 500 MG tablet Take 1 tablet (500 mg total) by mouth daily for 5 days 2/21/19 2/26/19  Hubert Avila MD   celecoxib (CeleBREX) 200 mg capsule Take 1 capsule (200 mg total) by mouth daily 9/26/18   Malissa Aleman MD   diltiazem (CARDIZEM CD) 240 mg 24 hr capsule Take 1 capsule (240 mg total) by mouth daily 1/28/19   Georgiana Krishnamurthy MD   furosemide (LASIX) 20 mg tablet Take 0 5 tablets (10 mg total) by mouth every other day Pt takes half a pill 3 x a week 1/14/19   Georgiana Krishnamurthy MD   ipratropium-albuterol (DUO-NEB) 0 5-2 5 mg/3 mL Take 3 mL by nebulization every 6 (six) hours 2/8/18   AILEEN Rollins   metoprolol succinate (TOPROL-XL) 25 mg 24 hr tablet Take 25 mg by mouth 2 (two) times a day  Historical Provider, MD   nystatin (MYCOSTATIN) powder Apply topically 4 (four) times a day 8/8/18   Rick Mcgee MD   silver sulfadiazine (SILVADENE,SSD) 1 % cream Apply topically daily 1/21/19   Merrell Kayser, MD       No Known Allergies    Objective   Vitals: Blood pressure 143/86, pulse 104, temperature 98 8 °F (37 1 °C), temperature source Temporal, resp  rate (!) 28, height 5' 7" (1 702 m), weight 104 kg (230 lb 2 6 oz), SpO2 97 %, not currently breastfeeding  ,Body mass index is 36 05 kg/m²  Intake/Output Summary (Last 24 hours) at 2/22/2019 1322  Last data filed at 2/22/2019 1130  Gross per 24 hour   Intake 588 33 ml   Output    Net 588 33 ml     Invasive Devices     Peripheral Intravenous Line            Peripheral IV 02/21/19 Left Hand less than 1 day    Peripheral IV 02/22/19 Right;Upper Arm less than 1 day                Physical Exam   Constitutional: She is oriented to person, place, and time  She appears well-developed and well-nourished  No distress  Patient is obese  When I initially saw patient she was lethargic and not able to be walking even with sternal rub  She would just once her eyes with noxious stimuli  No respiratory distress  Patient was on BiPAP support at that time with setting of 12/6  HENT:   Head: Normocephalic  Nose: Nose normal    Mouth/Throat: Oropharynx is clear and moist  No oropharyngeal exudate  Eyes: Pupils are equal, round, and reactive to light  Conjunctivae are normal    Neck: Neck supple  No JVD present  No tracheal deviation present  Cardiovascular: Normal rate, regular rhythm and normal heart sounds  Pulmonary/Chest: Effort normal    Breath sounds decreased but clear   Abdominal: Soft  She exhibits no distension  There is no tenderness  There is no guarding  Obese, nontender   Musculoskeletal: She exhibits no edema  Lymphadenopathy:     She has no cervical adenopathy  Neurological: She is alert and oriented to person, place, and time  Skin: Skin is warm and dry  No rash noted  Psychiatric: She has a normal mood and affect  Her behavior is normal  Thought content normal        Lab Results: ABG:   Lab Results   Component Value Date    PHART 7 381 02/22/2019    JTR9LAQ 59 1 (HH) 02/22/2019    PO2ART 71 7 (L) 02/22/2019    GFB9NEK 34 3 (H) 02/22/2019    BEART 7 2 02/22/2019    SOURCE Radial, Right 02/22/2019   , BNP: No results found for: BNP, CBC:   Lab Results   Component Value Date    WBC 9 14 02/22/2019    HGB 12 9 02/22/2019    HCT 43 2 02/22/2019    MCV 89 02/22/2019     02/22/2019    ADJUSTEDWBC 7 70 08/02/2016    MCH 26 7 (L) 02/22/2019    MCHC 29 9 (L) 02/22/2019    RDW 14 6 02/22/2019    MPV 9 1 02/22/2019    NRBC 0 02/22/2019   , CMP:   Lab Results   Component Value Date     08/28/2015    K 4 5 02/22/2019    K 5 0 08/28/2015    CL 93 (L) 02/22/2019     08/28/2015    CO2 36 (H) 02/22/2019    CO2 39 (H) 02/21/2019    ANIONGAP 5 08/28/2015    BUN 10 02/22/2019    BUN 19 08/28/2015    CREATININE 0 27 (L) 02/22/2019    CREATININE 0 25 (L) 08/28/2015    GLUCOSE 121 02/21/2019    GLUCOSE 114 08/28/2015    CALCIUM 8 8 02/22/2019    CALCIUM 8 8 08/28/2015    AST 13 02/22/2019    AST 11 08/28/2015    ALT 23 02/22/2019    ALT 18 08/28/2015    ALKPHOS 104 02/22/2019    ALKPHOS 84 08/28/2015    PROT 6 1 (L) 08/28/2015    BILITOT 0 27 08/28/2015    EGFR 107 02/22/2019   , PT/INR:   Lab Results   Component Value Date    INR 1 01 02/22/2019    INR 1 06 08/28/2015   , Troponin: No results found for: TROPONIN    Imaging Studies: I have personally reviewed pertinent reports  and I have personally reviewed pertinent films in PACS    EKG, Pathology, and Other Studies: I have personally reviewed pertinent reports  VTE Prophylaxis: Enoxaparin (Lovenox)    Code Status: Level 3 - DNAR and DNI    Counseling/Coordination of Care:  Total floor / unit time spent today 40 minutes  Greater than 50% of total time was spent with the patient and / or family counseling and / or coordination of care   A description of the counseling / coordination of care: I reviewed patient's chart, chest radiograph, and discussed case with patient's family and also primary medical team

## 2019-02-22 NOTE — ASSESSMENT & PLAN NOTE
· Chronic  Pt has been bed bound for > 20 years  Patient was encouraged to moved to chair throughout the day    Upon discharge patient will be discharged to home with home visits as patient refused outpatient rehabilitation

## 2019-02-22 NOTE — PROGRESS NOTES
Patient transferred to ICU at [phone] on continuous heart monitor  Report given to [facility]

## 2019-02-22 NOTE — ASSESSMENT & PLAN NOTE
· Patient has limited activity due to chronic muscular dystrophy    Nutrition consult was placed and patient was advised to lose weight

## 2019-02-22 NOTE — ASSESSMENT & PLAN NOTE
· Cardiac echo 2/26/19- EF 55-60%  Chest CT 2/26/19- Right upper lobe infiltrate and left basilar consolidation concerning for multifocal pneumonia  Chronic right lower lobe and basilar infiltrates, perhaps fibrosis due to chronic aspiration with trace pleural effusion  Small left pleural effusion  Evidence of remote granulomatous disease  CXR 2/25- moderate to severe cardiomegaly with mild pulmonary vascular congestion with small right pleural effusion suggesting mild-to-moderate CHF  Right lower lobe lung opacity possibly related to atelectasis and/or pneumonia  Initial BNP 1 996 which came down to  during hospitalization  During hospitalization had patient had episode of desaturation down to the 80s with concern for possible fluid overload  Patient was given a 1 time dose of 40 mg Lasix  Initially patient was on Lasix 10 mg every other day, but dose was increased to 40 mg daily after episode of desaturation  Patient will be discharged to go home on Lasix 20 mg PO daily  Patient was followed by Cardiology during admission  Given home dose of Toprol XL 25mg BID, & Cardizem 240mg PO qd, which she will continue after discharge to home

## 2019-02-22 NOTE — RAPID RESPONSE
Progress Note - Rapid Response   Boy Sandoval 80 y o  female MRN: [id number]    Time Called ( Time): 3138  Date Called: 2/22/2019  Level of Care: MS  Room#: 456  [id number] Time (Eden Haas GQQL)[id number]  Event End Time ( Time): 1010  Primary reason for call: Acute change in mental status  Interventions:  Airway/Breathing:  NPPV  Circulation: N/A  Other Treatments: N/A       Assessment:   1  Acute on chronic hypercarbic respiratory failure - unclear etology - viral URI vs worsening of pts underlying MD vs obesity hypoventilation syndrome    Plan:   · Optimize bipap  · No evidence of infection - evaluate procalcitonin  · Respiratory pathogen profile to evaluate for viral causes  · Low suspicion for PE - not tachycardic or hypoxic  · Discontinue IVF  · Continue steroids  · Continue azithromycin for anti-inflammatory effects  · Continue breathing treatments         HPI/Chief Complaint (Background/Situation):   Boy Sandoval is a 80y o  year old female with a PMH of Muscular dystrophy, chronic hypercarbic respiratory failure, morbid obesisty, mobility dysfunction (pt is bed bound and wheelchair bound at baseline), who presented to the [facility] ED overnight with complains of wheezing, fatigue, decreased appetite  Patient is on 2 L nasal cannula p r n  During the day with BiPAP at night  She has not been compliant with her BiPAP for several days secondary to cough  She was admitted to the general medical floor and placed on BiPAP  She had worsening respiratory acidosis however was still awake and interactive  A rapid response was called because the patient became unresponsive and was and unable to be awakened  ABG bedside shows increased CO2  Patient's presentation is consistent with CO2 narcosis  Patient has not received any recent medications such as benzodiazepines or opiates  Blood sugar is within normal limits  She is hemodynamically stable    She is not hypoxic with an SpO2 of 98% on a 25% FiO2     Will bring her to the step-down unit for further monitoring and optimization of her BiPAP  If she fails BiPAP a goals of care discussion will continue with family      Historical Information   Past Medical History:   Diagnosis Date    Arthritis     CHF [facility]congestive heart failure) ([facility] )     Hypertension     Morbid obesity ([facility] )     Muscular dystrophy      Past Surgical History:   Procedure Laterality Date    APPENDECTOMY      CHOLECYSTECTOMY      HYSTERECTOMY      THYROID CYST EXCISION      VEIN SURGERY Bilateral      Social History   Social History     Substance and Sexual Activity   Alcohol Use Not Currently    Alcohol/week: 0 0 oz    Frequency: Never    Drinks per session: Patient refused    Binge frequency: Never     Social History     Substance and Sexual Activity   Drug Use No     Social History     Tobacco Use   Smoking Status Never Smoker   Smokeless Tobacco Never Used     Family History: non-contributory    Meds/Allergies     Current Facility-Administered Medications:  acetaminophen 650 mg Oral Q6H PRN KIMBERLYN Bray-SOCRATES    azithromycin 500 mg Intravenous Q24H KIMBERLYN Bray-SOCRATES Last Rate: Stopped (02/22/19 0645)   diltiazem 240 mg Oral Daily KIMBERLYN Rios-SOCRATES    enoxaparin 40 mg Subcutaneous Daily KIMBERLYN Rios-SOCRATES    furosemide 10 mg Oral Every Other Day KIMBERLYN Rios-SOCRATES    ipratropium-albuterol 3 mL Nebulization Q4H KIMBERLYN Rios-C    ipratropium-albuterol 3 mL Nebulization Q2H PRN KIMBERLYN Rios-SOCRATES    methylPREDNISolone sodium succinate 60 mg Intravenous Q12H [address] KIMBERLYN Rios-SOCRATES    metoprolol succinate 25 mg Oral BID KIMBERLYN Rios-SOCRATES    nystatin  Topical 4x Daily KIMBERLYN Rios-C    silver sulfadiazine  Topical Daily KIMBERLYN Rios-SOCRATES    sodium chloride 50 mL/hr Intravenous Continuous Tram Wnislow PA-C Last Rate: 50 mL/hr (02/22/19 1102)         sodium chloride 50 mL/hr Last Rate: 50 mL/hr (02/22/19 1102)       No Known Allergies    ROS: unable to obtain due to acuity of condition  Vitals:   Vitals:    02/22/19 1400 02/22/19 1502 02/22/19 1546 02/22/19 1600   BP: 137/77      BP Location:       Pulse: (!) 107      Resp: 20      Temp:    98 6 °F (37 °C)   TempSrc:    Temporal   SpO2: 95% 94% 93%    Weight:       Height:             Physical Exam   Constitutional: She appears well-developed and well-nourished  No distress  HENT:   Head: Normocephalic and atraumatic  Eyes: Pupils are equal, round, and reactive to light  Neck: Neck supple  No JVD present  Cardiovascular: Normal rate, regular rhythm and normal heart sounds  Exam reveals no gallop and no friction rub  No murmur heard  Pulmonary/Chest: Effort normal  She has decreased breath sounds  Abdominal: Soft  Bowel sounds are normal    Musculoskeletal: Normal range of motion  Neurological: She is unresponsive  No cranial nerve deficit or sensory deficit  Skin: Skin is warm and dry  Capillary refill takes less than 2 seconds  She is not diaphoretic  Intake/Output Summary (Last 24 hours) at 2/22/2019 1321  Last data filed at 2/22/2019 1130  Gross per 24 hour   Intake 588 33 ml   Output    Net 588 33 ml       Respiratory    Lab Data (Last 4 hours)      02/22 0928 02/22 1109          pH, Arterial       7 314          7 381          pCO2, Arterial       70 7          59 1          pO2, Arterial       65 9          71 7          HCO3, Arterial       35 1          34 3          Base Excess, Arterial       6 6          7 2               O2/Vent Data         02/22 1000   Most Recent      Non-Invasive Ventilation Mode  BiPAP  BiPAP               Invasive Devices     Peripheral Intravenous Line            Peripheral IV 02/21/19 Left Hand less than 1 day    Peripheral IV 02/22/19 Right;Upper Arm less than 1 day                DIAGNOSTIC DATA:    Lab: I have personally reviewed pertinent lab results     CBC:   Results from last 7 days Lab Units 02/22/19  0539   WBC Thousand/uL 9 14   HEMOGLOBIN g/dL 12 9   HEMATOCRIT % 43 2   PLATELETS Thousands/uL 210     CMP:   Results from last 7 days   Lab Units 02/22/19  0539 02/22/19  0037 02/21/19  2323   POTASSIUM mmol/L 4 5 4 6  --    CHLORIDE mmol/L 93* 92*  --    CO2 mmol/L 36* 38*  --    CO2, I-STAT mmol/L  --   --  39*   BUN mg/dL 10 11  --    CREATININE mg/dL 0 27* 0 25*  --    CALCIUM mg/dL 8 8 8 8  --    ALK PHOS U/L 104 108  --    ALT U/L 23 22  --    AST U/L 13 16  --    GLUCOSE, ISTAT mg/dl  --   --  121     PT/INR:   Lab Results   Component Value Date    INR 1 01 02/22/2019   ,   Magnesium: No components found for: MAG,   Phosphorous:   Lab Results   Component Value Date    PHOS 2 7 02/22/2019       Microbiology:  Lab Results   Component Value Date    BLOODCX No Growth After 5 Days  04/26/2018    BLOODCX No Growth After 5 Days  04/26/2018    BLOODCX No Growth After 5 Days  01/27/2016    BLOODCX No Growth After 5 Days  01/27/2016    URINECX >100,000 cfu/ml Escherichia coli (A) 04/26/2018    URINECX Culture results to follow  07/28/2016    URINECX >100,000 cfu/ml Escherichia coli 07/28/2016    URINECX 40,000-49,000 cfu/ml Klebsiella pneumoniae 07/28/2016    SPUTUMCULTUR 3+ Growth of Mixed Respiratory Antonella 01/31/2016         OUTCOME:   Transfer to step down unit  Family notified of transfer: yes  Family member contacted: Daughters  Code Status: Level 3 - DNAR and DNI  Critical Care Time: Total Critical Care time spent 35 minutes excluding procedures, teaching and family updates

## 2019-02-22 NOTE — H&P
H&P Exam - Liz Shabazz 80 y o  female MRN: [id number]    Unit/Bed#: ARIEL Encounter: [id number]    Assessment/Plan:  Shashank Rodriguez is an 81 yo F who presents today with SOB and hypoxia  She will be admitted under the service of Dr Rissa Cifuentes for an estimated LOS > 2 midnights with expected disposition as discharge to home  * Acute on chronic respiratory failure with hypoxia and hypercapnia (HCC)  Assessment & Plan  · Pt with history of chronic respiratory failure 2/2 muscular dystrophy vs obesity hypoventilation syndrome presents with shortness of breath and hypoxia likely due to bronchitis  · Pt is usually on 2L NC O2 PRN during the day and BiPAP at night which she has not used for the past 2-3 days due to cough  · ABG done in ED showed pH- 7 374, pCO2- 64 5, HCO3- 37, O2- 96  Pt placed on BiPAP, will keep her on BiPAP overnight  · WBC count WNL, vital signs stable  Continue to monitor  · CXR with no obvious cardiopulmonary disease on prelim read, final read pending  · Follow up influenza/RSV PCR, urine strep/legionella  · Repeat ABG in the AM    · Duo-Nebs q4h, q2h PRN  · IV Solumedrol 60mg BID  · Continue IV Azithromycin    Hypertension  Assessment & Plan  · Monitor blood pressure and continue home medications including Lasix 10mg every other day, Toprol XL 25mg PO BID and Cardizem 240mg PO qd  Muscular dystrophy, limb girdle  Assessment & Plan  · Pt bed bound  Stable  · Pt has been bed bound for > 20 years  Chronic diastolic heart failure ([facility] )  Assessment & Plan  · Last echo done in 2016 showed EF of 60-65% with grade 2 diastolic dysfunction  Normal systolic function  · Pt does not appear to be overtly fluid overloaded at this time  · Continue home Lasix 10mg every other day, Toprol XL 25mg BID, and Cardizem 240mg PO qd    Generalized osteoarthritis  Assessment & Plan  · Hold NSAID, continue Tylenol       Morbid obesity ([facility] )  Assessment & Plan  · Dietary counseling    Hyponatremia  Assessment & Plan  · Na+ 129 on admission  Unclear etiology at this time  · Will hydrate with NS @ 50 cc/hr and repeat AM BMP  Global:   ·     Regular diet, monitor and replete electrolytes as needed, Lovenox of VTE prophylaxis, gentle hydration with NS @ 50 cc/hr  History of Present Illness   Anna Templeton is a 81 yo F with PMH of muscular dystrophy, chronic respiratory failure, morbid obesity, heart failure with preserved ejection fraction, hypertension who presents today for a 4 day history of URI symptoms including non productive cough, congestion, wheezing, fatigue decreased appetite  Symptoms began gradually and have progressively worsened to the point where family noted that pt was very lethargic and slightly confused today  Pt is on baseline 2L of O2 PRN during the day and Bipap at night, but has not used her BiPAP for the past 2-3 days due to cough  Denies fevers, chills, headaches, dizziness, nausea, vomiting, chest pain, chest tightness, syncope, lower extremity edema  Pt states she thinks she may have a urine infection due to increased urinary frequency, but her daughter who takes care of her denies this  ED course: Bipap, CXR, Neb treatment  Review of Systems   Constitutional: Positive for activity change, appetite change and fatigue  Negative for chills, diaphoresis and fever  HENT: Positive for congestion and sore throat  Negative for ear discharge, ear pain, postnasal drip, rhinorrhea, sinus pressure and sneezing  Eyes: Negative  Respiratory: Positive for cough, shortness of breath and wheezing  Negative for apnea, choking, chest tightness and stridor  Cardiovascular: Negative for chest pain, palpitations and leg swelling  Gastrointestinal: Negative for abdominal pain, constipation, diarrhea, nausea and vomiting  Genitourinary: Positive for frequency  Negative for dysuria, enuresis, flank pain, hematuria, pelvic pain and urgency  Musculoskeletal: Negative for arthralgias, joint swelling and myalgias  Skin: Negative for color change, pallor and rash  Neurological: Negative for dizziness, seizures, syncope and numbness  Psychiatric/Behavioral: Negative  Historical Information   Past Medical History:   Diagnosis Date    Arthritis     [facility]cong[facility]) ([facility] )     Hypertension     Morbid obesity ([facility] )     Muscular dystrophy      Past Surgical History:   Procedure Laterality Date    APPENDECTOMY      CHOLECYSTECTOMY      HYSTERECTOMY      THYROID CYST EXCISION      VEIN SURGERY Bilateral      Social History   Social History     Substance and Sexual Activity   Alcohol Use No     Social History     Substance and Sexual Activity   Drug Use No     Social History     Tobacco Use   Smoking Status Never Smoker   Smokeless Tobacco Never Used     Family History: No family history on file  Meds/Allergies   PTA meds:   Prior to Admission Medications   Prescriptions Last Dose Informant Patient Reported? Taking?   acetaminophen (TYLENOL) 325 mg tablet   No No   Sig: Take one or 2 every 6 hours as needed for pain   albuterol (ACCUNEB) 0 63 MG/3ML nebulizer solution   Yes No   Sig: Inhale every 8 (eight) hours   azithromycin (ZITHROMAX) 500 MG tablet   No No   Sig: Take 1 tablet (500 mg total) by mouth daily for 5 days   celecoxib (CeleBREX) 200 mg capsule   No No   Sig: Take 1 capsule (200 mg total) by mouth daily   diltiazem (CARDIZEM CD) 240 mg 24 hr capsule   No No   Sig: Take 1 capsule (240 mg total) by mouth daily   furosemide (LASIX) 20 mg tablet   No No   Sig: Take 0 5 tablets (10 mg total) by mouth every other day Pt takes half a pill 3 x a week   ipratropium-albuterol (DUO-NEB) 0 5-2 5 mg/3 mL   No No   Sig: Take 3 mL by nebulization every 6 (six) hours   metoprolol succinate (TOPROL-XL) 25 mg 24 hr tablet   Yes No   Sig: Take 25 mg by mouth 2 (two) times a day     nystatin (MYCOSTATIN) powder   No No   Sig: Apply topically 4 (four) times a day   silver sulfadiazine (SILVADENE,SSD) 1 % cream   No No   Sig: Apply topically daily      Facility-Administered Medications: None     No Known Allergies    Objective   First Vitals:   Blood Pressure: 163/83 (02/21/19 2123)  Pulse: 85 (02/21/19 2123)  Temperature: 98 1 °F (36 7 °C) (02/21/19 2123)  Temp Source: Tympanic (02/21/19 2123)  Respirations: 18 (02/21/19 2123)  Weight - Scale: 113 kg (250 lb) (02/21/19 2158)  SpO2: 98 % (02/21/19 2123)    Current Vitals:   Blood Pressure: 158/83 (02/22/19 0001)  Pulse: 90 (02/22/19 0001)  Temperature: 97 6 °F (36 4 °C) (02/21/19 2143)  Temp Source: Tympanic (02/21/19 2143)  Respirations: 20 (02/22/19 0001)  Weight - Scale: 113 kg (250 lb) (02/21/19 2158)  SpO2: 100 % (02/22/19 0015)    No intake or output data in the 24 hours ending 02/22/19 0133    Invasive Devices     Peripheral Intravenous Line            Long-Dwell Peripheral IV (Midline) 02/22/19 Left Brachial less than 1 day    Peripheral IV 02/21/19 Left Hand less than 1 day                Physical Exam   Constitutional: She is oriented to person, place, and time  She appears well-developed and well-nourished  No distress  HENT:   Head: Normocephalic and atraumatic  Eyes: Pupils are equal, round, and reactive to light  Conjunctivae and EOM are normal  Right eye exhibits no discharge  Left eye exhibits no discharge  Neck: Normal range of motion  Neck supple  Cardiovascular: Normal rate, regular rhythm, normal heart sounds and intact distal pulses  Exam reveals no gallop and no friction rub  No murmur heard  Pulmonary/Chest: She has wheezes (diffuse inspiratory and expiratory)  She has rales  Course breath sounds noted throughout   Abdominal: Soft  Bowel sounds are normal  She exhibits no distension and no mass  There is no tenderness  There is no rebound and no guarding  No hernia    + Hernia   Musculoskeletal: Normal range of motion     Neurological: She is alert and oriented to person, place, and time  Skin: She is not diaphoretic         Lab Results:   Results for orders placed or performed during the hospital encounter of 02/21/19   CBC and differential   Result Value Ref Range    WBC 9 63 4 31 - 10 16 Thousand/uL    RBC 5 00 3 81 - 5 12 Million/uL    Hemoglobin 13 3 11 5 - 15 4 g/dL    Hematocrit 42 7 34 8 - 46 1 %    MCV 85 82 - 98 fL    MCH 26 6 (L) 26 8 - 34 3 pg    MCHC 31 1 (L) 31 4 - 37 4 g/dL    RDW 18 4 (H) 11 6 - 15 1 %    MPV 9 3 8 9 - 12 7 fL    Platelets 412 320 - 160 Thousands/uL    nRBC 0 /100 WBCs    Neutrophils Relative 85 (H) 43 - 75 %    Immat GRANS % 0 0 - 2 %    Lymphocytes Relative 7 (L) 14 - 44 %    Monocytes Relative 8 4 - 12 %    Eosinophils Relative 0 0 - 6 %    Basophils Relative 0 0 - 1 %    Neutrophils Absolute 8 05 (H) 1 85 - 7 62 Thousands/µL    Immature Grans Absolute 0 04 0 00 - 0 20 Thousand/uL    Lymphocytes Absolute 0 71 0 60 - 4 47 Thousands/µL    Monocytes Absolute 0 80 0 17 - 1 22 Thousand/µL    Eosinophils Absolute 0 00 0 00 - 0 61 Thousand/µL    Basophils Absolute 0 03 0 00 - 0 10 Thousands/µL   Protime-INR   Result Value Ref Range    Protime 10 6 9 4 - 11 7 seconds    INR 1 01 0 86 - 1 16   APTT   Result Value Ref Range    PTT 28 24 - 33 seconds   Comprehensive metabolic panel   Result Value Ref Range    Sodium 129 (L) 136 - 145 mmol/L    Potassium 4 6 3 5 - 5 3 mmol/L    Chloride 92 (L) 100 - 108 mmol/L    CO2 38 (H) 21 - 32 mmol/L    ANION GAP -1 (L) 4 - 13 mmol/L    BUN 11 5 - 25 mg/dL    Creatinine 0 25 (L) 0 60 - 1 30 mg/dL    Glucose 108 65 - 140 mg/dL    Calcium 8 8 8 3 - 10 1 mg/dL    AST 16 5 - 45 U/L    ALT 22 12 - 78 U/L    Alkaline Phosphatase 108 46 - 116 U/L    Total Protein 6 7 6 4 - 8 2 g/dL    Albumin 2 7 (L) 3 5 - 5 0 g/dL    Total Bilirubin 0 30 0 20 - 1 00 mg/dL    eGFR 110 ml/min/1 73sq m   Troponin I   Result Value Ref Range    Troponin I <0 02 <=0 04 ng/mL   B-type natriuretic peptide   Result Value Ref Range NT-proBNP 873 (H) <450 pg/mL   POCT Blood Gas (CG8+)   Result Value Ref Range    pH, Art i-STAT 7 366 7 350 - 7 450    pH, i-STAT Temp Corrected 7 374     pCO2, Art i-STAT 64 5 (H) 36 0 - 44 0 mm HG    pCO2, i-STAT TC 63 0 mm HG    pO2, ART i-STAT 89 0 75 0 - 129 0 mm HG    pO2, i-STAT TC 86 mm HG    BE, i-STAT 9 (H) -2 - 3 mmol/L    HCO3, Art i-STAT 37 0 (H) 22 0 - 28 0 mmol/L    CO2, i-STAT 39 (H) 21 - 32 mmol/L    O2 Sat, i-STAT 96 95 - 98 %    SODIUM, I-STAT 124 (L) 136 - 145 mmol/l    Potassium, i-STAT 4 0 3 5 - 5 3 mmol/L    Calcium, Ionized i-STAT 1 20 1  12 - 1 32 mmol/L    Hct, i-STAT 36 34 8 - 46 1 %    Hgb, i-STAT 12 2 11 5 - 15 4 g/dl    Glucose, i-STAT 121 65 - 140 mg/dl    Specimen Type ARTERIAL     Delivery System Nasal Cannula     Respiratory Rate 0     Vent Type 00     Vent Value 002     Ancillary Values 0     Pressure Setting 00     SITE 000     Additional Settings 00     MANDO TEST 000      Imaging:   XR chest 1 view portable    (Results Pending)     EKG: No change from prior       Code Status: DNR/DNI

## 2019-02-22 NOTE — RESPIRATORY THERAPY NOTE
Pt was very hard to arose  I was here when the rapid response was called  Pt was on the bipap 12/6/30%  ABG obtained  Pt was ordered to ICU; Transported to ICU on the bipap  Event in the elevator with the wheel getting stuck in the floor  Maintence and security were called and immediately got the wheel free  Pt stable thru transport

## 2019-02-22 NOTE — ED PROVIDER NOTES
History  Chief Complaint   Patient presents with    URI     sx's started Monday  Patient is an 66-year-old female was brought in by her family for increased lethargy, cough that seems nonproductive some nasal congestion  There has been no documented fever the patient states her appetite is been poor  Patient denies any chest pain, no shortness of breath  She states she is unable to get out of bed because of the fatigue  Patient denies any abdominal pain, no nausea vomiting or diarrhea associated with this  There is no sick contacts in the family  Patient's primary care doctor called in a [facility] for her with no relief  The patient was not seen by her primary care doctor  Prior to Admission Medications   Prescriptions Last Dose Informant Patient Reported? Taking?   acetaminophen (TYLENOL) 325 mg tablet   No No   Sig: Take one or 2 every 6 hours as needed for pain   albuterol (ACCUNEB) 0 63 MG/3ML nebulizer solution   Yes No   Sig: Inhale every 8 (eight) hours   azithromycin (ZITHROMAX) 500 MG tablet   No No   Sig: Take 1 tablet (500 mg total) by mouth daily for 5 days   celecoxib (CeleBREX) 200 mg capsule   No No   Sig: Take 1 capsule (200 mg total) by mouth daily   diltiazem (CARDIZEM CD) 240 mg 24 hr capsule   No No   Sig: Take 1 capsule (240 mg total) by mouth daily   furosemide (LASIX) 20 mg tablet   No No   Sig: Take 0 5 tablets (10 mg total) by mouth every other day Pt takes half a pill 3 x a week   ipratropium-albuterol (DUO-NEB) 0 5-2 5 mg/3 mL   No No   Sig: Take 3 mL by nebulization every 6 (six) hours   metoprolol succinate (TOPROL-XL) 25 mg 24 hr tablet   Yes No   Sig: Take 25 mg by mouth 2 (two) times a day     nystatin (MYCOSTATIN) powder   No No   Sig: Apply topically 4 (four) times a day   silver sulfadiazine (SILVADENE,SSD) 1 % cream   No No   Sig: Apply topically daily      Facility-Administered Medications: None       Past Medical History:   Diagnosis Date    Arthritis     CHF (congestive heart failure) (HCC)     History of methicillin resistant staphylococcus aureus (MRSA)     7/29/2016 MRSA (nares) positive  Negative 2/21/2018  Isolation discontinued 2/25/2019    Hypertension     Morbid obesity ([address] )     Muscular dystrophy        Past Surgical History:   Procedure Laterality Date    APPENDECTOMY      CHOLECYSTECTOMY      HYSTERECTOMY      IR PICC LINE  2/22/2019    THYROID CYST EXCISION      VEIN SURGERY Bilateral        No family history on file  I have reviewed and agree with the history as documented  Social History     Tobacco Use    Smoking status: Never Smoker    Smokeless tobacco: Never Used   Substance Use Topics    Alcohol use: Not Currently     Alcohol/week: 0 0 oz     Frequency: Never     Drinks per session: Patient refused     Binge frequency: Never    Drug use: No        Review of Systems   Constitutional: Positive for activity change, appetite change and fatigue  Negative for chills and fever  HENT: Positive for congestion and rhinorrhea  Negative for drooling, facial swelling, sore throat and trouble swallowing  Eyes: Negative for photophobia and visual disturbance  Respiratory: Positive for cough and chest tightness  Cardiovascular: Negative for chest pain and leg swelling  Gastrointestinal: Negative for abdominal pain, nausea and vomiting  Genitourinary: Negative for difficulty urinating and flank pain  Musculoskeletal: Negative for back pain and neck pain  Skin: Negative  Neurological: Negative for weakness and numbness  Hematological: Negative  Psychiatric/Behavioral: Negative  Physical Exam  Physical Exam   Constitutional: She is oriented to person, place, and time  Vital signs are normal  She appears well-developed and well-nourished  She appears ill  HENT:   Head: Normocephalic and atraumatic  Eyes: Pupils are equal, round, and reactive to light  EOM are normal    Neck: Normal range of motion  Neck supple  Cardiovascular: Normal rate and regular rhythm  Pulmonary/Chest: Effort normal  No respiratory distress  She has decreased breath sounds in the right lower field and the left lower field  Abdominal: Soft  Bowel sounds are normal  She exhibits no distension  There is no tenderness  Musculoskeletal: Normal range of motion  Neurological: She is alert and oriented to person, place, and time  Skin: Skin is warm and dry  Psychiatric: She has a normal mood and affect  Nursing note and vitals reviewed        Vital Signs  ED Triage Vitals   Temperature Pulse Respirations Blood Pressure SpO2   02/21/19 2123 02/21/19 2123 02/21/19 2123 02/21/19 2123 02/21/19 2123   98 1 °F (36 7 °C) 85 18 163/83 98 %      Temp Source Heart Rate Source Patient Position - Orthostatic VS BP Location FiO2 (%)   02/21/19 2123 02/21/19 2123 02/21/19 2123 02/21/19 2123 02/23/19 0000   Tympanic Monitor Lying Left arm 30      Pain Score       02/21/19 2143       No Pain           Vitals:    02/24/19 2048 02/24/19 2144 02/25/19 0836 02/25/19 1333   BP: (!) 180/77 147/67 133/59 138/64   Pulse: 95  105 86   Patient Position - Orthostatic VS: Lying Lying Lying Lying       Visual Acuity  Visual Acuity      Most Recent Value   L Pupil Size (mm)  3   R Pupil Size (mm)  3   L Pupil Shape  Round   R Pupil Shape  Round          ED Medications  Medications   acetaminophen (TYLENOL) tablet 650 mg (650 mg Oral Given 2/22/19 1803)   metoprolol succinate (TOPROL-XL) 24 hr tablet 25 mg (25 mg Oral Given 2/25/19 0840)   furosemide (LASIX) tablet 10 mg (10 mg Oral Given 2/24/19 0936)   diltiazem (CARDIZEM CD) 24 hr capsule 240 mg (240 mg Oral Given 2/25/19 0839)   enoxaparin (LOVENOX) subcutaneous injection 40 mg (40 mg Subcutaneous Given 2/25/19 0840)   nystatin (MYCOSTATIN) powder ( Topical Given 2/25/19 1252)   silver sulfadiazine (SILVADENE,SSD) 1 % cream ( Topical Given 2/25/19 0841)   benzonatate (TESSALON PERLES) capsule 100 mg (has no administration in time range)   levalbuterol (XOPENEX) inhalation solution 0 63 mg (0 63 mg Nebulization Given 2/24/19 1150)   celecoxib (CeleBREX) capsule 200 mg (200 mg Oral Given 2/25/19 0839)   docusate sodium (COLACE) capsule 100 mg (100 mg Oral Given 2/25/19 0839)   fluticasone-vilanterol (BREO ELLIPTA) 100-25 mcg/inh inhaler 1 puff (1 puff Inhalation Given 2/25/19 0840)   ipratropium (ATROVENT) 0 02 % inhalation solution 0 5 mg (has no administration in time range)   levalbuterol (XOPENEX) inhalation solution 0 63 mg (has no administration in time range)   acetylcysteine (MUCOMYST) 200 mg/mL inhalation solution 600 mg (has no administration in time range)   methylPREDNISolone sodium succinate (Solu-MEDROL) injection 20 mg (has no administration in time range)   guaiFENesin (MUCINEX) 12 hr tablet 600 mg (600 mg Oral Given 2/25/19 1459)   albuterol inhalation solution 2 5 mg (2 5 mg Nebulization Given 2/21/19 2338)   furosemide (LASIX) injection 40 mg (40 mg Intravenous Given 2/22/19 1402)   lidocaine (XYLOCAINE) 1 % injection (2 mL Infiltration Given 2/22/19 1702)   polyethylene glycol (MIRALAX) packet 17 g (17 g Oral Given 2/25/19 1459)       Diagnostic Studies  Results Reviewed     Procedure Component Value Units Date/Time    Comprehensive metabolic panel [[id number]]  (Abnormal) Collected:  02/22/19 0037    Lab Status:  Final result Specimen:  Blood from Arm, Left Updated:  02/22/19 0107     Sodium 129 mmol/L      Potassium 4 6 mmol/L      Chloride 92 mmol/L      CO2 38 mmol/L      ANION GAP -1 mmol/L      BUN 11 mg/dL      Creatinine 0 25 mg/dL      Glucose 108 mg/dL      Calcium 8 8 mg/dL      AST 16 U/L      ALT 22 U/L      Alkaline Phosphatase 108 U/L      Total Protein 6 7 g/dL      Albumin 2 7 g/dL      Total Bilirubin 0 30 mg/dL      eGFR 110 ml/min/1 73sq m     Narrative:       National Kidney Disease Education Program recommendations are as follows:  GFR calculation is accurate only with a steady state creatinine  Chronic Kidney disease less than 60 ml/min/1 73 sq  meters  Kidney failure less than 15 ml/min/1 73 sq  meters      B-type natriuretic peptide [[id number]]  (Abnormal) Collected:  02/22/19 0037    Lab Status:  Final result Specimen:  Blood from Arm, Left Updated:  02/22/19 0107     NT-proBNP 873 pg/mL     Troponin I [[id number]]  (Normal) Collected:  02/22/19 0037    Lab Status:  Final result Specimen:  Blood from Arm, Left Updated:  02/22/19 0101     Troponin I <0 02 ng/mL     APTT [[id number]]  (Normal) Collected:  02/22/19 0037    Lab Status:  Final result Specimen:  Blood from Arm, Left Updated:  02/22/19 0056     PTT 28 seconds     Protime-INR [[id number]]  (Normal) Collected:  02/22/19 0037    Lab Status:  Final result Specimen:  Blood from Arm, Left Updated:  02/22/19 0056     Protime 10 6 seconds      INR 1 01    CBC and differential [[id number]]  (Abnormal) Collected:  02/21/19 2327    Lab Status:  Final result Specimen:  Blood from Arm, Left Updated:  02/21/19 2334     WBC 9 63 Thousand/uL      RBC 5 00 Million/uL      Hemoglobin 13 3 g/dL      Hematocrit 42 7 %      MCV 85 fL      MCH 26 6 pg      MCHC 31 1 g/dL      RDW 18 4 %      MPV 9 3 fL      Platelets 981 Thousands/uL      nRBC 0 /100 WBCs      Neutrophils Relative 85 %      Immat GRANS % 0 %      Lymphocytes Relative 7 %      Monocytes Relative 8 %      Eosinophils Relative 0 %      Basophils Relative 0 %      Neutrophils Absolute 8 05 Thousands/µL      Immature Grans Absolute 0 04 Thousand/uL      Lymphocytes Absolute 0 71 Thousands/µL      Monocytes Absolute 0 80 Thousand/µL      Eosinophils Absolute 0 00 Thousand/µL      Basophils Absolute 0 03 Thousands/µL     POCT Blood Gas (CG8+) [[id number]]  (Abnormal) Collected:  02/21/19 2323    Lab Status:  Final result Specimen:  Arterial Updated:  02/21/19 2327     pH, Art i-STAT 7 366     pH, i-STAT Temp Corrected 7 374     pCO2, Art i-STAT 64 5 mm HG      pCO2, i-STAT TC 63 0 mm HG      pO2, ART i-STAT 89 0 mm HG      pO2, i-STAT TC 86 mm HG      BE, i-STAT 9 mmol/L      HCO3, Art i-STAT 37 0 mmol/L      CO2, i-STAT 39 mmol/L      O2 Sat, i-STAT 96 %      SODIUM, I-STAT 124 mmol/l      Potassium, i-STAT 4 0 mmol/L      Calcium, Ionized i-STAT 1 20 mmol/L      Hct, i-STAT 36 %      Hgb, i-STAT 12 2 g/dl      Glucose, i-STAT 121 mg/dl      Specimen Type ARTERIAL     Delivery System Nasal Cannula     Respiratory Rate 0     Vent Type 00     Vent Value 002     Ancillary Values 0     Pressure Setting 00     SITE 000     Additional Settings 00     MANDO TEST 000                 IR PICC line   Final Result by Richard Betancur DO (02/22 1732)   Successful placement of right arm midline catheter  The catheter is ready for use       _______________________________________________________________   PROCEDURE DETAILS:    Operators: Dr Sydni Manriquez, [address] [place] [address] attending, performed the procedure  Anesthesia: 1% lidocaine was injected in the skin and subcutaneous tissues overlying the access site  Medications: 1% lidocaine   Contrast: 0 mL of Omnipaque 300   Fluoroscopy time: 0 minutes   Images: 2      COMMENTS:   Following the discussion of the risks, benefits and alternatives to the procedure, written informed consent was obtained from the patient and her daughter  The procedure was done at the bedside in the ICU  The right arm veins were evaluated as a    potential access site with ultrasound  The site was prepped and draped in the usual sterile fashion  All elements of maximal sterile barrier technique, cap and mask and sterile gown and sterile gloves and sterile full-body drape and hand hygiene and 2%    chlorhexidine for cutaneous antisepsis  Sterile ultrasound technique with sterile gel and sterile probe covers was also utilized  A preprocedure timeout was performed per [facility] protocol  Lidocaine was administered to the skin and a small skin incision was made   Under ultrasound guidance, the right basilic vein was punctured using a micropuncture set  Static images of real time needle entry into the vessel were obtained  A peel-away    sheath was then placed over a guidewire  A double lumen power catheter measuring   5 cm in length was then placed through the peel-away sheath  The peel-away sheath and guidewire were then removed  The catheter was aspirated and then flushed with    saline  The catheter was secured to the skin and a sterile dressing was applied  Workstation performed: [id number]         XR chest 1 view portable   Final Result by Karon Holter, MD (02/22 0848)   Stable cardiomegaly without acute pulmonary disease  Workstation performed: [id number]                    Procedures  ECG 12 Lead Documentation  Date/Time: 2/21/2019 10:42 PM  Performed by: Sabra Riedel, MD  Authorized by: Sabra Riedel, MD     Indications / Diagnosis:  Shortness of breath  ECG reviewed by me, the ED Provider: yes    Patient location:  ED  Previous ECG:     Comparison to cardiac monitor: Yes    Interpretation:     Interpretation: abnormal    Rate:     ECG rate:  92    ECG rate assessment: normal    Rhythm:     Rhythm: sinus rhythm    Ectopy:     Ectopy: none    QRS:     QRS axis:  Normal  Conduction:     Conduction: abnormal      Abnormal conduction: complete RBBB    ST segments:     ST segments:  Normal  T waves:     T waves: normal    Other findings:     Other findings: EMERY             Phone Contacts  ED Phone Contact    ED Course                               MDM  Number of Diagnoses or Management Options  Cough:   Hypercapnia:   Hypoxemia:   Somatic dysfunction of chest wall:   Tracheobronchitis:   Diagnosis management comments: Patient's blood work was otherwise normal stable chest x-ray  Blood gas shows that she was hypercapnic with some hypoxemia    Patient is going to be admitted to the hospital for further treatment and evaluation, patient family state understanding and agreement the assessment plan  Amount and/or Complexity of Data Reviewed  Clinical lab tests: ordered and reviewed  Tests in the radiology section of CPT®: ordered and reviewed        Disposition  Final diagnoses:   Hypercapnia   Tracheobronchitis   Somatic dysfunction of chest wall   Cough   Hypoxemia     Time reflects when diagnosis was documented in both MDM as applicable and the Disposition within this note     Time User Action Codes Description Comment    2/22/2019 12:36 AM Mariely Snellen Add [R06 89] Hypercapnia     2/22/2019 12:36 AM Mariely Snellen Add [J40] Tracheobronchitis     2/22/2019 12:36 AM Mariely Snellen Add [M99 09] Somatic dysfunction of chest wall     2/22/2019 12:36 AM Mariely Snellen Add [R05] Cough     2/22/2019 12:37 AM Mariely Snellen Add [R09 02] Hypoxemia     2/22/2019 11:12 AM Halina Heater Add [R06 89] Difficulty breathing     2/24/2019 12:40 PM Lunette Lark Add [E66 01] Morbid obesity ([address] )     2/25/2019  9:26 AM Lunette Lark Add [I10] Essential hypertension     2/25/2019  9:26 AM Lunette Lark Modify [I10] Essential hypertension     2/25/2019  9:26 AM Nicolette Almendarez Remove [I10] Essential hypertension     2/25/2019  9:26 AM Lunette Lark Add [I10] Essential hypertension     2/25/2019  9:26 AM Lunette Lark Modify [I10] Essential hypertension       ED Disposition     ED Disposition Condition Date/Time Comment    Admit Stable Fri Feb 22, 2019 12:36 AM Case was discussed with Dr Marisol Ye and the patient's admission status was agreed to be Admission Status: inpatient status to the service of Dr Marisol Ye           Follow-up Information    None         Current Discharge Medication List      CONTINUE these medications which have NOT CHANGED    Details   acetaminophen (TYLENOL) 325 mg tablet Take one or 2 every 6 hours as needed for pain  Qty: 30 tablet, Refills: 0      albuterol (ACCUNEB) 0 63 MG/3ML nebulizer solution Inhale every 8 (eight) hours azithromycin (ZITHROMAX) 500 MG tablet Take 1 tablet (500 mg total) by mouth daily for 5 days  Qty: 5 tablet, Refills: 0    Associated Diagnoses: Bronchitis      celecoxib (CeleBREX) 200 mg capsule Take 1 capsule (200 mg total) by mouth daily  Qty: 30 capsule, Refills: 0    Associated Diagnoses: Osteoarthritis, unspecified osteoarthritis type, unspecified site      diltiazem (CARDIZEM CD) 240 mg 24 hr capsule Take 1 capsule (240 mg total) by mouth daily  Qty: 90 capsule, Refills: 3    Associated Diagnoses: Tachycardia      furosemide (LASIX) 20 mg tablet Take 0 5 tablets (10 mg total) by mouth every other day Pt takes half a pill 3 x a week  Qty: 30 tablet, Refills: 5    Associated Diagnoses: Lower extremity edema      ipratropium-albuterol (DUO-NEB) 0 5-2 5 mg/3 mL Take 3 mL by nebulization every 6 (six) hours  Qty: 30 mL, Refills: 6    Associated Diagnoses: Shortness of breath      metoprolol succinate (TOPROL-XL) 25 mg 24 hr tablet Take 25 mg by mouth 2 (two) times a day  nystatin (MYCOSTATIN) powder Apply topically 4 (four) times a day  Qty: 56 7 g, Refills: 3    Associated Diagnoses: Candida infection of flexural skin      silver sulfadiazine (SILVADENE,SSD) 1 % cream Apply topically daily  Qty: 50 g, Refills: 3    Associated Diagnoses: Skin ulcer, limited to breakdown of skin ([facility] [address] )           No discharge procedures on file      ED Provider  Electronically Signed by           Reba Rodriguez MD  02/25/19 0569

## 2019-02-22 NOTE — ASSESSMENT & PLAN NOTE
Patient has history of chronic respiratory failure 2/2 muscular dystrophy vs obesity hypoventilation syndrome presents with SOB & hypoxia likely due to bronchitis  Influenza/RSV PCR, & urine strep/legionella negative  Chest CT-  Right upper lobe infiltrate and left basilar consolidation concerning for multifocal pneumonia  Chronic right lower lobe and basilar infiltrates, perhaps fibrosis due to chronic aspiration with trace pleural effusion  Small left pleural effusion  Evidence of remote granulomatous disease  Procalcitonin negative, no additional antibiotic treatment necessary after patient completed 4 day course of azithromycin during her hospitalization  Patient had episode of worsening of hypoxia and hypercapnia which required an increased setting on BiPAP  After brief treatment on BiPAP, symptoms resolved  Patient  was continued on home BiPAP setting of 10/5  Chest XR 2/25/19- Moderate to severe cardiomegaly with mild pulmonary vascular congestion with small right pleural effusion suggesting mild to moderate CHF  Right lower lobe lung opacity possibly related to atelectasis and/or pneumonia  Initial BNP 1996 improve down to 701  Patient was given Lasix 10 mg every other day but was increased up to 40 mg PO daily  Patient will be discharged home on Lasix 20 mg p o  Daily  During hospitalization patient required oxygen supplementation via nasal cannula  Upon discharge patient was required to L O2 supplementation  Labs and vitals were stable upon discharge  During hospitalization patient completed 3 treated course of Solu-Medrol, and will be discharged on a prednisone taper at home  Patient was provided with chest PT and given Xopenex breathing treatments with Mucomyst   Pulmonology was following patient during hospitalization  Cough assist device ordered and will be used after discharge

## 2019-02-22 NOTE — UTILIZATION REVIEW
Initial Clinical Review    Admission: Date/Time/Statement: 2/22/19 @ 0038   Orders Placed This Encounter   Procedures    Inpatient Admission     Standing Status:   Standing     Number of Occurrences:   1     Order Specific Question:   Admitting Physician     Answer:   Emani Paige     Order Specific Question:   Level of Care     Answer:   Med Surg [16]     Order Specific Question:   Estimated length of stay     Answer:   More than 2 Midnights     Order Specific Question:   Certification     Answer:   I certify that inpatient services are medically necessary for this patient for a duration of greater than two midnights  See H&P and MD Progress Notes for additional information about the patient's course of treatment  ED: Date/Time/Mode of Arrival:   ED Arrival Information     Expected Arrival Acuity Means of Arrival Escorted By Service Admission Type    - 2/21/2019 21:18 Urgent Ambulance [facility] Critical Care/ICU Urgent    Arrival Complaint    UPPER RESPIRATORY        Chief Complaint:   Chief Complaint   Patient presents with    URI     sx's started Monday  History of Illness:   79 yo F who presents today with SOB and hypoxia    ED Vital Signs:   ED Triage Vitals   Temperature Pulse Respirations Blood Pressure SpO2   02/21/19 2123 02/21/19 2123 02/21/19 2123 02/21/19 2123 02/21/19 2123   98 1 °F (36 7 °C) 85 18 163/83 98 %      Temp Source Heart Rate Source Patient Position - Orthostatic VS BP Location FiO2 (%)   02/21/19 2123 02/21/19 2123 02/21/19 2123 02/21/19 2123 --   Tympanic Monitor Lying Left arm       Pain Score       02/21/19 2143       No Pain        Wt Readings from Last 1 Encounters:   02/22/19 103 kg (226 lb 6 6 oz)     Vital Signs (abnormal):   Pertinent Labs/Diagnostic Test Results:   CO2 64 5  CL 92 CO2 38 CR 0 25 ANION GAP -1 ALB 2 7    pH, Arterial 7 350 - 7 450 7 289Low     PH ART TC 7 350 - 7 450 7 295Low     pCO2, Arterial 36 0 - 44 0 mm Hg 82 0High Panic     PCO2 (TC) Arterial 36 0 - 44 0 mm Hg 80  6High Panic     pO2, Arterial 75 0 - 129 0 mm Hg 91 6    PO2 (TC) Arterial 75 0 - 129 0 mm Hg 89 3    HCO3, Arterial 22 0 - 28 0 mmol/L 38 5High     Base Excess, Arterial mmol/L 8 6    O2 Content, Arterial 16 0 - 23 0 mL/dL 18 5    O2 HGB,Arterial  94 0 - 97 0 % 95 6    SOURCE  Radial, Right    MANDO TEST  Yes    Temperature Degrees Fehrenheit 97 9    Non Vent type BIPAP  BIPAP    IPAP  12    EPAP  6    BIPAP fio2 % 40      CXR=Stable cardiomegaly without acute pulmonary disease  EKG=Rhythm: sinus rhythm      Ectopy: none      QRS axis:  Normal    Conduction: abnormal      Abnormal conduction: complete RBBB      ST segments:  Normal    T waves: normal      Other findings: EMERY    ED Treatment:   Medication Administration from 02/21/2019 2118 to 02/22/2019 0153       Date/Time Order Dose Route Action Action by Comments     02/21/2019 9930 albuterol inhalation solution 2 5 mg 2 5 mg Nebulization Given Jaquan Martin RN         Past Medical/Surgical History:    Active Ambulatory Problems     Diagnosis Date Noted    Difficulty breathing 07/30/2016    Allergic rhinitis 05/27/2014    Chronic pain 04/18/2016    Generalized osteoarthritis 05/27/2014    Hypertension 01/13/2017    Hypoxia 02/06/2014    Inappropriate sinus tachycardia 02/04/2016    Macular degeneration 05/21/2014    Morbid obesity ([facility] ) 05/27/2014    Muscular dystrophy, 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    Sacral ulcer ([facility] ) 11/04/2016    Sleep apnea 02/17/2017     Resolved Ambulatory Problems     Diagnosis Date Noted    No Resolved Ambulatory Problems     Past Medical History:   Diagnosis Date    Arthritis     [facility]     Hypertension     Morbid obesity ([facility] [address] [facility] )     Muscular dystrophy      Admitting Diagnosis: Cough [R05]  Hypoxemia [R09 02]  Hypercapnia [R06 89]  Tracheobronchitis [J40]  Upper respiratory infection [J06 9]  Somatic dysfunction of chest wall [M99 09]  Age/Sex: 80 y o  female  Assessment/Plan:   Acute on chronic respiratory failure with hypoxia and hypercapnia (HCC)  Assessment & Plan  · Pt with history of chronic respiratory failure 2/2 muscular dystrophy vs obesity hypoventilation syndrome presents with shortness of breath and hypoxia likely due to bronchitis  · Pt is usually on 2L NC O2 PRN during the day and BiPAP at night which she has not used for the past 2-3 days due to cough  · ABG done in ED showed pH- 7 374, pCO2- 64 5, HCO3- 37, O2- 96  Pt placed on BiPAP, will keep her on BiPAP overnight  · WBC count WNL, vital signs stable  Continue to monitor  · CXR with no obvious cardiopulmonary disease on prelim read, final read pending  · Follow up influenza/RSV PCR, urine strep/legionella  · Repeat ABG in the AM    · Duo-Nebs q4h, q2h PRN  · IV Solumedrol 60mg BID  · Continue IV Azithromycin  Hypertension  Assessment & Plan  · Monitor blood pressure and continue home medications including Lasix 10mg every other day, Toprol XL 25mg PO BID and Cardizem 240mg PO qd  Muscular dystrophy, limb girdle  Assessment & Plan  · Pt bed bound  Stable  · Pt has been bed bound for > 20 years  Chronic diastolic heart failure ([facility] [address] )  Assessment & Plan  · Last echo done in 2016 showed EF of 60-65% with grade 2 diastolic dysfunction  Normal systolic function  · Pt does not appear to be overtly fluid overloaded at this time     · Continue home Lasix 10mg every other day, Toprol XL 25mg BID, and Cardizem 240mg PO qd  Admission Orders:  TELEMETRY  CONSULT PULMONARY  VENODYNES  Scheduled Meds:   Current Facility-Administered Medications:  acetaminophen 650 mg Oral Q6H PRN Radha Castillo MD    [facility] Hold] azithromycin 500 mg Intravenous Q24H Radha Castillo MD Last Rate: Stopped (02/22/19 0645)   diltiazem 240 mg Oral Daily Radha Castillo MD    [facility] Hold] enoxaparin 40 mg Subcutaneous Daily Radha Castillo MD    furosemide 10 mg Oral Every Other Day Radha Castillo MD [[facility] Hold] ipratropium-albuterol 3 mL Nebulization Q4H Pepe De MD    [MAR Hold] ipratropium-albuterol 3 mL Nebulization Q2H PRN Pepe De MD    [facility] Hold] methylPREDNISolone sodium succinate 60 mg Intravenous Q12H [address] Pepe De MD    metoprolol succinate 25 mg Oral BID Pepe De MD    sodium chloride 50 mL/hr Intravenous Continuous Pepe De MD Last Rate: 50 mL/hr (02/22/19 1102)     Continuous Infusions:   sodium chloride 50 mL/hr Last Rate: 50 mL/hr (02/22/19 1102)     PRN Meds:   acetaminophen    [MAR Hold] ipratropium-albuterol

## 2019-02-23 LAB
ATRIAL RATE: 92 BPM
MRSA NOSE QL CULT: NORMAL
P AXIS: 64 DEGREES
PR INTERVAL: 190 MS
PROCALCITONIN SERPL-MCNC: <0.05 NG/ML
QRS AXIS: 92 DEGREES
QRSD INTERVAL: 132 MS
QT INTERVAL: 388 MS
QTC INTERVAL: 479 MS
T WAVE AXIS: 45 DEGREES
VENTRICULAR RATE: 92 BPM

## 2019-02-23 PROCEDURE — 99233 SBSQ HOSP IP/OBS HIGH 50: CPT | Performed by: INTERNAL MEDICINE

## 2019-02-23 PROCEDURE — 94660 CPAP INITIATION&MGMT: CPT

## 2019-02-23 PROCEDURE — 84145 PROCALCITONIN (PCT): CPT | Performed by: PHYSICIAN ASSISTANT

## 2019-02-23 PROCEDURE — 94760 N-INVAS EAR/PLS OXIMETRY 1: CPT

## 2019-02-23 PROCEDURE — 94640 AIRWAY INHALATION TREATMENT: CPT

## 2019-02-23 PROCEDURE — 93010 ELECTROCARDIOGRAM REPORT: CPT | Performed by: INTERNAL MEDICINE

## 2019-02-23 RX ORDER — LEVALBUTEROL INHALATION SOLUTION 0.63 MG/3ML
0.63 SOLUTION RESPIRATORY (INHALATION)
Status: DISCONTINUED | OUTPATIENT
Start: 2019-02-23 | End: 2019-02-25

## 2019-02-23 RX ORDER — ACETYLCYSTEINE 200 MG/ML
3 SOLUTION ORAL; RESPIRATORY (INHALATION)
Status: DISCONTINUED | OUTPATIENT
Start: 2019-02-23 | End: 2019-02-23

## 2019-02-23 RX ORDER — BENZONATATE 100 MG/1
100 CAPSULE ORAL 3 TIMES DAILY PRN
Status: DISCONTINUED | OUTPATIENT
Start: 2019-02-23 | End: 2019-02-28 | Stop reason: HOSPADM

## 2019-02-23 RX ORDER — LEVALBUTEROL INHALATION SOLUTION 0.63 MG/3ML
0.63 SOLUTION RESPIRATORY (INHALATION) EVERY 4 HOURS PRN
Status: DISCONTINUED | OUTPATIENT
Start: 2019-02-23 | End: 2019-02-28 | Stop reason: HOSPADM

## 2019-02-23 RX ORDER — CELECOXIB 100 MG/1
200 CAPSULE ORAL DAILY
Status: DISCONTINUED | OUTPATIENT
Start: 2019-02-24 | End: 2019-02-28 | Stop reason: HOSPADM

## 2019-02-23 RX ORDER — LEVALBUTEROL INHALATION SOLUTION 0.63 MG/3ML
0.63 SOLUTION RESPIRATORY (INHALATION)
Status: DISCONTINUED | OUTPATIENT
Start: 2019-02-23 | End: 2019-02-23

## 2019-02-23 RX ORDER — ACETYLCYSTEINE 200 MG/ML
3 SOLUTION ORAL; RESPIRATORY (INHALATION) 2 TIMES DAILY
Status: DISCONTINUED | OUTPATIENT
Start: 2019-02-23 | End: 2019-02-24

## 2019-02-23 RX ADMIN — METHYLPREDNISOLONE SODIUM SUCCINATE 60 MG: 125 INJECTION, POWDER, FOR SOLUTION INTRAMUSCULAR; INTRAVENOUS at 21:43

## 2019-02-23 RX ADMIN — NYSTATIN: 100000 POWDER TOPICAL at 08:13

## 2019-02-23 RX ADMIN — LEVALBUTEROL HYDROCHLORIDE 0.63 MG: 0.63 SOLUTION RESPIRATORY (INHALATION) at 07:50

## 2019-02-23 RX ADMIN — ACETYLCYSTEINE 3 ML: 200 SOLUTION ORAL; RESPIRATORY (INHALATION) at 07:47

## 2019-02-23 RX ADMIN — ACETYLCYSTEINE 3 ML: 200 SOLUTION ORAL; RESPIRATORY (INHALATION) at 11:49

## 2019-02-23 RX ADMIN — SILVER SULFADIAZINE: 10 CREAM TOPICAL at 08:13

## 2019-02-23 RX ADMIN — ENOXAPARIN SODIUM 40 MG: 40 INJECTION SUBCUTANEOUS at 08:29

## 2019-02-23 RX ADMIN — DILTIAZEM HYDROCHLORIDE 240 MG: 240 CAPSULE, COATED, EXTENDED RELEASE ORAL at 08:30

## 2019-02-23 RX ADMIN — METOPROLOL SUCCINATE 25 MG: 25 TABLET, EXTENDED RELEASE ORAL at 17:45

## 2019-02-23 RX ADMIN — LEVALBUTEROL HYDROCHLORIDE 0.63 MG: 0.63 SOLUTION RESPIRATORY (INHALATION) at 15:25

## 2019-02-23 RX ADMIN — NYSTATIN: 100000 POWDER TOPICAL at 11:46

## 2019-02-23 RX ADMIN — NYSTATIN: 100000 POWDER TOPICAL at 17:49

## 2019-02-23 RX ADMIN — LEVALBUTEROL HYDROCHLORIDE 0.63 MG: 0.63 SOLUTION RESPIRATORY (INHALATION) at 20:45

## 2019-02-23 RX ADMIN — METHYLPREDNISOLONE SODIUM SUCCINATE 60 MG: 125 INJECTION, POWDER, FOR SOLUTION INTRAMUSCULAR; INTRAVENOUS at 08:30

## 2019-02-23 RX ADMIN — ACETYLCYSTEINE 600 MG: 200 SOLUTION ORAL; RESPIRATORY (INHALATION) at 20:47

## 2019-02-23 RX ADMIN — METOPROLOL SUCCINATE 25 MG: 25 TABLET, EXTENDED RELEASE ORAL at 08:29

## 2019-02-23 RX ADMIN — AZITHROMYCIN MONOHYDRATE 500 MG: 500 INJECTION, POWDER, LYOPHILIZED, FOR SOLUTION INTRAVENOUS at 05:33

## 2019-02-23 RX ADMIN — LEVALBUTEROL HYDROCHLORIDE 0.63 MG: 0.63 SOLUTION RESPIRATORY (INHALATION) at 11:49

## 2019-02-23 NOTE — ASSESSMENT & PLAN NOTE
· Patient is on BiPAP at home overnight  Required a brief increased level for few days  Was eventually restarted on home setting of 10/5 overnight several days prior to discharge which she has been tolerating well  Patient will continue BiPAP at home

## 2019-02-23 NOTE — PROGRESS NOTES
Report given to [facility] from 2S, pt now being moved to room 208 and not 210 to accommodate a Bariatric Bed  Transfer was made with Marcio Fleming Nurse, Betzaida Chamorro CNA, and Bette Hess CNA and Gisselle Pinto RN  Pt transferred via bed, all personal belongings taken with patient for transfer  Daughters at pt bedside

## 2019-02-23 NOTE — PROGRESS NOTES
[address] [facility] Progress Note - Fatuma Cruz 80 y o  female MRN: [id number]    Unit/Bed#: ICU 08 Encounter: [id number]      Assessment/Plan:  * Acute on chronic respiratory failure with hypoxia and hypercapnia ([facility] )  Assessment & Plan  H/o chronic respiratory failure 2/2 muscular dystrophy vs obesity hypoventilation syndrome presents with SOB & hypoxia likely due to bronchitis  Pt is usually on 2L NC O2 PRN during the day if sick & always on BiPAP at night  · ABG done in ED pH- 7 374, pCO2- 64 5, HCO3- 37, O2- 96   · WBC count WNL, vital signs stable  · CXR-stable cardiomegaly without acute pulmonary disease  · Negative for influenza/RSV PCR, & urine strep/legionella   · Duo-Nebs q4h, q2h PRN  · IV Solumedrol 60mg BID to be decreased per pulmonology  · Continue IV Azithromycin    Chronic diastolic heart failure ([facility] )  Assessment & Plan  · Echo in 2016 - EF of 60-65% with grade 2 diastolic dysfunction  Normal systolic function  · Pt does not appear to be overtly fluid overloaded at this time  · Continue home Lasix 10mg every other day, Toprol XL 25mg BID, & Cardizem 240mg PO qd    Essential hypertension  Assessment & Plan  · Stable, 124/67 this a m  · Monitor BP & continue home Lasix 10mg every other day, Toprol XL 25mg PO BID & Cardizem 240mg PO qd  Muscular dystrophy, limb girdle  Assessment & Plan  Chronic  Pt has been bed bound for > 20 years  PORTIA treated with BiPAP  Assessment & Plan  Chronic, on home BIPAP qhs    Hyponatremia  Assessment & Plan  Na+ 129 on adm  Unclear etiology at this time  · Rpt labwork in am to monitor    Generalized osteoarthritis  Assessment & Plan  Continue Tylenol prn    Morbid obesity ([facility] )  Assessment & Plan  · Dietary counseling, mobility extremely limited due to MD    Subjective:   Patient seen and examined at bedside this morning  Patient had no acute overnight events    Patient tolerated and slept well with the BiPAP mask on at change settings after having rapid response yesterday  Patient denies any pain, or worsening shortness of breath  Does report that she has frequent coughing which is difficult to produce sputum from  Daughters present at bedside  Objective:   Vitals: Blood pressure 111/57, pulse 88, temperature 98 4 °F (36 9 °C), temperature source Tympanic, resp  rate (!) 36, height 5' 7" (1 702 m), weight 105 kg (231 lb 14 8 oz), SpO2 95 %, not currently breastfeeding  ,Body mass index is 36 32 kg/m²    Wt Readings from Last 3 Encounters:   02/23/19 105 kg (231 lb 14 8 oz)   04/26/18 125 kg (275 lb)   07/29/16 89 kg (196 lb 3 4 oz)       Intake/Output Summary (Last 24 hours) at 2/23/2019 1453  Last data filed at 2/23/2019 1349  Gross per 24 hour   Intake 1660 ml   Output 2200 ml   Net -540 ml     Physical Exam: General appearance: alert and oriented, in no acute distress, cooperative and moderately obese  Head: Normocephalic, without obvious abnormality, atraumatic  Lungs: diminished breath sounds and no audible wheezing  Heart: regular rate and rhythm, S1, S2 normal, no murmur, click, rub or gallop  Abdomen: soft, non-tender; bowel sounds normal; no masses,  no organomegaly     Recent Results (from the past 24 hour(s))   Procalcitonin    Collection Time: 02/22/19  6:15 PM   Result Value Ref Range    Procalcitonin <0 05 <=0 25 ng/ml   Procalcitonin    Collection Time: 02/23/19  5:38 AM   Result Value Ref Range    Procalcitonin <0 05 <=0 25 ng/ml       Current Facility-Administered Medications   Medication Dose Route Frequency Provider Last Rate Last Dose    acetaminophen (TYLENOL) tablet 650 mg  650 mg Oral Q6H PRN Juanita Reyes PA-C   650 mg at 02/22/19 1803    acetylcysteine (MUCOMYST) 200 mg/mL inhalation solution 600 mg  3 mL Nebulization BID Carmine Patel MD        azithromycin (ZITHROMAX) 500 mg in sodium chloride 0 9 % 250 mL IVPB  500 mg Intravenous Q24H Juanita Reyes PA-C   Stopped at 02/23/19 0708    benzonatate (TESSALON PERLES) capsule 100 mg  100 mg Oral TID PRN Cody Green DO        diltiazem (CARDIZEM CD) 24 hr capsule 240 mg  240 mg Oral Daily Jennifer Knox, PA-C   240 mg at 02/23/19 0830    enoxaparin (LOVENOX) subcutaneous injection 40 mg  40 mg Subcutaneous Daily Sissy Check, PA-C   40 mg at 02/23/19 7267    furosemide (LASIX) tablet 10 mg  10 mg Oral Every Other Day Sissy Check, PA-C   10 mg at 02/22/19 1116    levalbuterol (XOPENEX) inhalation solution 0 63 mg  0 63 mg Nebulization Q4H PRN Rosendo Segovia DO   0 63 mg at 02/23/19 1149    levalbuterol (XOPENEX) inhalation solution 0 63 mg  0 63 mg Nebulization 4x Daily Rosendo Segovia DO        methylPREDNISolone sodium succinate (Solu-MEDROL) injection 60 mg  60 mg Intravenous Q12H [address] Jennifer Knox, PA-C   60 mg at 02/23/19 0830    metoprolol succinate (TOPROL-XL) 24 hr tablet 25 mg  25 mg Oral BID Sissy Check, PA-C   25 mg at 02/23/19 9695    nystatin (MYCOSTATIN) powder   Topical 4x Daily Sissy Check, PA-C        silver sulfadiazine (SILVADENE,SSD) 1 % cream   Topical Daily Sissy Check, PA-C         Invasive Devices     Peripherally Inserted Central Catheter Line            PICC Line 02/22/19 less than 1 day              Lab, Imaging and other studies: I have personally reviewed pertinent reports      VTE Pharmacologic Prophylaxis: Enoxaparin (Lovenox)  VTE Mechanical Prophylaxis: sequential compression device    Cody Green DO

## 2019-02-23 NOTE — PLAN OF CARE
Problem: RESPIRATORY - ADULT  Goal: Achieves optimal ventilation and oxygenation  Description  INTERVENTIONS:  - Assess for changes in respiratory status  - Assess for changes in mentation and behavior  - Position to facilitate oxygenation and minimize respiratory effort  - Oxygen administration by appropriate delivery method based on oxygen saturation (per order) or ABGs  - Encourage broncho-pulmonary hygiene including cough, deep breathe, Incentive Spirometry  - Assess the need for suctioning and aspirate as needed  - Assess and instruct to report SOB or any respiratory difficulty  - Respiratory Therapy support as indicated   Outcome: Progressing     Problem: Prexisting or High Potential for Compromised Skin Integrity  Goal: Skin integrity is maintained or improved  Description  INTERVENTIONS:  - Identify patients at risk for skin breakdown  - Assess and monitor skin integrity  - Assess and monitor nutrition and hydration status  - Monitor labs (i e  albumin)  - Assess for incontinence   - Turn and reposition patient  - Assist with mobility/ambulation  - Relieve pressure over bony prominences  - Avoid friction and shearing  - Provide appropriate hygiene as needed including keeping skin clean and dry  - Evaluate need for skin moisturizer/barrier cream  - Collaborate with interdisciplinary team (i e  Nutrition, Rehabilitation, etc )   - Patient/family teaching  Outcome: Progressing     Problem: Potential for Falls  Goal: Patient will remain free of falls  Description  INTERVENTIONS:  - Assess patient frequently for physical needs  -  Identify cognitive and physical deficits and behaviors that affect risk of falls    -  [place] fall precautions as indicated by assessment   - Educate patient/family on patient safety including physical limitations  - Instruct patient to call for assistance with activity based on assessment  - Modify environment to reduce risk of injury  - Consider OT/PT consult to assist with strengthening/mobility  Outcome: Progressing

## 2019-02-23 NOTE — PLAN OF CARE
Problem: RESPIRATORY - ADULT  Goal: Achieves optimal ventilation and oxygenation  Description  INTERVENTIONS:  - Assess for changes in respiratory status  - Assess for changes in mentation and behavior  - Position to facilitate oxygenation and minimize respiratory effort  - Oxygen administration by appropriate delivery method based on oxygen saturation (per order) or ABGs  - Encourage broncho-pulmonary hygiene including cough, deep breathe, Incentive Spirometry  - Assess the need for suctioning and aspirate as needed  - Assess and instruct to report SOB or any respiratory difficulty  - Respiratory Therapy support as indicated   2/23/2019 1621 by Teresa Fair RN  Outcome: Completed  2/23/2019 0812 by Teresa Fair RN  Outcome: Progressing     Problem: Prexisting or High Potential for Compromised Skin Integrity  Goal: Skin integrity is maintained or improved  Description  INTERVENTIONS:  - Identify patients at risk for skin breakdown  - Assess and monitor skin integrity  - Assess and monitor nutrition and hydration status  - Monitor labs (i e  albumin)  - Assess for incontinence   - Turn and reposition patient  - Assist with mobility/ambulation  - Relieve pressure over bony prominences  - Avoid friction and shearing  - Provide appropriate hygiene as needed including keeping skin clean and dry  - Evaluate need for skin moisturizer/barrier cream  - Collaborate with interdisciplinary team (i e  Nutrition, Rehabilitation, etc )   - Patient/family teaching  2/23/2019 1621 by Teresa Fair RN  Outcome: Completed  2/23/2019 0812 by Teresa Fair RN  Outcome: Progressing     Problem: Potential for Falls  Goal: Patient will remain free of falls  Description  INTERVENTIONS:  - Assess patient frequently for physical needs  -  Identify cognitive and physical deficits and behaviors that affect risk of falls    -  [place] fall precautions as indicated by assessment   - Educate patient/family on patient safety including physical limitations  - Instruct patient to call for assistance with activity based on assessment  - Modify environment to reduce risk of injury  - Consider OT/PT consult to assist with strengthening/mobility  2/23/2019 1621 by Sary Epps RN  Outcome: Completed  2/23/2019 0812 by Sary Epps, RN  Outcome: Progressing

## 2019-02-24 LAB
ANION GAP SERPL CALCULATED.3IONS-SCNC: 3 MMOL/L (ref 4–13)
BASOPHILS # BLD AUTO: 0 THOUSANDS/ΜL (ref 0–0.1)
BASOPHILS NFR BLD AUTO: 0 % (ref 0–1)
BUN SERPL-MCNC: 28 MG/DL (ref 5–25)
CALCIUM SERPL-MCNC: 8.8 MG/DL (ref 8.3–10.1)
CHLORIDE SERPL-SCNC: 97 MMOL/L (ref 100–108)
CO2 SERPL-SCNC: 37 MMOL/L (ref 21–32)
CREAT SERPL-MCNC: 0.52 MG/DL (ref 0.6–1.3)
EOSINOPHIL # BLD AUTO: 0 THOUSAND/ΜL (ref 0–0.61)
EOSINOPHIL NFR BLD AUTO: 0 % (ref 0–6)
ERYTHROCYTE [DISTWIDTH] IN BLOOD BY AUTOMATED COUNT: 14.9 % (ref 11.6–15.1)
GFR SERPL CREATININE-BSD FRML MDRD: 86 ML/MIN/1.73SQ M
GLUCOSE SERPL-MCNC: 144 MG/DL (ref 65–140)
HCT VFR BLD AUTO: 36.9 % (ref 34.8–46.1)
HGB BLD-MCNC: 11.3 G/DL (ref 11.5–15.4)
IMM GRANULOCYTES # BLD AUTO: 0.04 THOUSAND/UL (ref 0–0.2)
IMM GRANULOCYTES NFR BLD AUTO: 1 % (ref 0–2)
LYMPHOCYTES # BLD AUTO: 0.56 THOUSANDS/ΜL (ref 0.6–4.47)
LYMPHOCYTES NFR BLD AUTO: 12 % (ref 14–44)
MAGNESIUM SERPL-MCNC: 1.9 MG/DL (ref 1.6–2.6)
MCH RBC QN AUTO: 26.7 PG (ref 26.8–34.3)
MCHC RBC AUTO-ENTMCNC: 30.6 G/DL (ref 31.4–37.4)
MCV RBC AUTO: 87 FL (ref 82–98)
MONOCYTES # BLD AUTO: 0.21 THOUSAND/ΜL (ref 0.17–1.22)
MONOCYTES NFR BLD AUTO: 5 % (ref 4–12)
NEUTROPHILS # BLD AUTO: 3.73 THOUSANDS/ΜL (ref 1.85–7.62)
NEUTS SEG NFR BLD AUTO: 82 % (ref 43–75)
NRBC BLD AUTO-RTO: 0 /100 WBCS
PHOSPHATE SERPL-MCNC: 2.3 MG/DL (ref 2.3–4.1)
PLATELET # BLD AUTO: 241 THOUSANDS/UL (ref 149–390)
PMV BLD AUTO: 8.8 FL (ref 8.9–12.7)
POTASSIUM SERPL-SCNC: 4.4 MMOL/L (ref 3.5–5.3)
RBC # BLD AUTO: 4.23 MILLION/UL (ref 3.81–5.12)
SODIUM SERPL-SCNC: 137 MMOL/L (ref 136–145)
WBC # BLD AUTO: 4.54 THOUSAND/UL (ref 4.31–10.16)

## 2019-02-24 PROCEDURE — 99232 SBSQ HOSP IP/OBS MODERATE 35: CPT | Performed by: INTERNAL MEDICINE

## 2019-02-24 PROCEDURE — 94760 N-INVAS EAR/PLS OXIMETRY 1: CPT

## 2019-02-24 PROCEDURE — 80048 BASIC METABOLIC PNL TOTAL CA: CPT | Performed by: STUDENT IN AN ORGANIZED HEALTH CARE EDUCATION/TRAINING PROGRAM

## 2019-02-24 PROCEDURE — 94640 AIRWAY INHALATION TREATMENT: CPT

## 2019-02-24 PROCEDURE — 94660 CPAP INITIATION&MGMT: CPT

## 2019-02-24 PROCEDURE — 84100 ASSAY OF PHOSPHORUS: CPT | Performed by: STUDENT IN AN ORGANIZED HEALTH CARE EDUCATION/TRAINING PROGRAM

## 2019-02-24 PROCEDURE — 85025 COMPLETE CBC W/AUTO DIFF WBC: CPT | Performed by: STUDENT IN AN ORGANIZED HEALTH CARE EDUCATION/TRAINING PROGRAM

## 2019-02-24 PROCEDURE — 94664 DEMO&/EVAL PT USE INHALER: CPT

## 2019-02-24 PROCEDURE — 83735 ASSAY OF MAGNESIUM: CPT | Performed by: STUDENT IN AN ORGANIZED HEALTH CARE EDUCATION/TRAINING PROGRAM

## 2019-02-24 PROCEDURE — 94668 MNPJ CHEST WALL SBSQ: CPT

## 2019-02-24 RX ORDER — ACETYLCYSTEINE 200 MG/ML
3 SOLUTION ORAL; RESPIRATORY (INHALATION)
Status: DISCONTINUED | OUTPATIENT
Start: 2019-02-24 | End: 2019-02-24

## 2019-02-24 RX ORDER — ACETYLCYSTEINE 200 MG/ML
3 SOLUTION ORAL; RESPIRATORY (INHALATION)
Status: DISCONTINUED | OUTPATIENT
Start: 2019-02-25 | End: 2019-02-25

## 2019-02-24 RX ORDER — FLUTICASONE FUROATE AND VILANTEROL 100; 25 UG/1; UG/1
1 POWDER RESPIRATORY (INHALATION) DAILY
Status: DISCONTINUED | OUTPATIENT
Start: 2019-02-24 | End: 2019-02-28 | Stop reason: HOSPADM

## 2019-02-24 RX ORDER — METHYLPREDNISOLONE SODIUM SUCCINATE 40 MG/ML
40 INJECTION, POWDER, LYOPHILIZED, FOR SOLUTION INTRAMUSCULAR; INTRAVENOUS EVERY 12 HOURS SCHEDULED
Status: DISCONTINUED | OUTPATIENT
Start: 2019-02-24 | End: 2019-02-25

## 2019-02-24 RX ORDER — DOCUSATE SODIUM 100 MG/1
100 CAPSULE, LIQUID FILLED ORAL 2 TIMES DAILY
Status: DISCONTINUED | OUTPATIENT
Start: 2019-02-24 | End: 2019-02-28 | Stop reason: HOSPADM

## 2019-02-24 RX ADMIN — CELECOXIB 200 MG: 100 CAPSULE ORAL at 09:37

## 2019-02-24 RX ADMIN — METOPROLOL SUCCINATE 25 MG: 25 TABLET, EXTENDED RELEASE ORAL at 09:36

## 2019-02-24 RX ADMIN — FUROSEMIDE 10 MG: 20 TABLET ORAL at 09:36

## 2019-02-24 RX ADMIN — AZITHROMYCIN MONOHYDRATE 500 MG: 500 INJECTION, POWDER, LYOPHILIZED, FOR SOLUTION INTRAVENOUS at 05:28

## 2019-02-24 RX ADMIN — METOPROLOL SUCCINATE 25 MG: 25 TABLET, EXTENDED RELEASE ORAL at 18:41

## 2019-02-24 RX ADMIN — LEVALBUTEROL HYDROCHLORIDE 0.63 MG: 0.63 SOLUTION RESPIRATORY (INHALATION) at 11:18

## 2019-02-24 RX ADMIN — METHYLPREDNISOLONE SODIUM SUCCINATE 60 MG: 125 INJECTION, POWDER, FOR SOLUTION INTRAMUSCULAR; INTRAVENOUS at 09:35

## 2019-02-24 RX ADMIN — ACETYLCYSTEINE 600 MG: 200 SOLUTION ORAL; RESPIRATORY (INHALATION) at 11:49

## 2019-02-24 RX ADMIN — NYSTATIN: 100000 POWDER TOPICAL at 11:18

## 2019-02-24 RX ADMIN — IPRATROPIUM BROMIDE 0.5 MG: 0.5 SOLUTION RESPIRATORY (INHALATION) at 19:36

## 2019-02-24 RX ADMIN — LEVALBUTEROL HYDROCHLORIDE 0.63 MG: 0.63 SOLUTION RESPIRATORY (INHALATION) at 15:00

## 2019-02-24 RX ADMIN — DOCUSATE SODIUM 100 MG: 100 CAPSULE, LIQUID FILLED ORAL at 18:41

## 2019-02-24 RX ADMIN — ENOXAPARIN SODIUM 40 MG: 40 INJECTION SUBCUTANEOUS at 09:35

## 2019-02-24 RX ADMIN — LEVALBUTEROL HYDROCHLORIDE 0.63 MG: 0.63 SOLUTION RESPIRATORY (INHALATION) at 07:50

## 2019-02-24 RX ADMIN — LEVALBUTEROL HYDROCHLORIDE 0.63 MG: 0.63 SOLUTION RESPIRATORY (INHALATION) at 19:36

## 2019-02-24 RX ADMIN — NYSTATIN: 100000 POWDER TOPICAL at 09:35

## 2019-02-24 RX ADMIN — ACETYLCYSTEINE 3 ML: 200 SOLUTION ORAL; RESPIRATORY (INHALATION) at 07:50

## 2019-02-24 RX ADMIN — DILTIAZEM HYDROCHLORIDE 240 MG: 240 CAPSULE, COATED, EXTENDED RELEASE ORAL at 09:35

## 2019-02-24 RX ADMIN — NYSTATIN: 100000 POWDER TOPICAL at 18:41

## 2019-02-24 RX ADMIN — NYSTATIN: 100000 POWDER TOPICAL at 21:59

## 2019-02-24 RX ADMIN — LEVALBUTEROL HYDROCHLORIDE 0.63 MG: 0.63 SOLUTION RESPIRATORY (INHALATION) at 11:50

## 2019-02-24 RX ADMIN — ACETYLCYSTEINE 600 MG: 200 SOLUTION ORAL; RESPIRATORY (INHALATION) at 19:36

## 2019-02-24 RX ADMIN — ACETYLCYSTEINE 3 ML: 200 SOLUTION ORAL; RESPIRATORY (INHALATION) at 15:03

## 2019-02-24 RX ADMIN — METHYLPREDNISOLONE SODIUM SUCCINATE 40 MG: 40 INJECTION, POWDER, FOR SOLUTION INTRAMUSCULAR; INTRAVENOUS at 21:59

## 2019-02-24 RX ADMIN — DOCUSATE SODIUM 100 MG: 100 CAPSULE, LIQUID FILLED ORAL at 11:17

## 2019-02-24 NOTE — PROGRESS NOTES
[facility] Practice Progress Note - Smitha Silverman 80 y o  female MRN: [id number]    Unit/Bed#: [address] [place] [address] Encounter: [id number]      Assessment/Plan:    * Acute on chronic respiratory failure with hypoxia and hypercapnia (HCC)  Assessment & Plan  Improving   H/o chronic respiratory failure 2/2 muscular dystrophy vs obesity hypoventilation syndrome presents with SOB & hypoxia likely due to bronchitis  Pt briefly placed on BiPAP with increased settings at 17/8  Continue home BiPAP setting of 10/5 tonight   · Continue O2 supplementation, currently on 2L   · Labs and vitals WNL today   · Continue IV Azithromycin, Day #3 today   · Solumedrol decreased to 40mg BID, will continue to titrate as necessary   · Continue mucomyst Q4H with every treatment of Xopenex   · Chest PT protocol started   · CXR-stable cardiomegaly without acute pulmonary disease  · Influenza/RSV PCR, & urine strep/legionella negative     Essential hypertension  Assessment & Plan  · Stable  · 118/55 this AM   · Continue home Lasix 10mg every other day, Toprol XL 25mg PO BID & Cardizem 240mg PO qd  · Continue to monitor vitals q routine shift     Muscular dystrophy, 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  · Pt has been bed bound for > 20 years    Chronic diastolic heart failure ([address] )  Assessment & Plan  · Echo in 2016 - EF of 60-65% with grade 2 diastolic dysfunction  Normal systolic function     · Continue home Lasix 10mg every other day  · Toprol XL 25mg BID, & Cardizem 240mg PO qd    PORTIA treated with BiPAP  Assessment & Plan  · Chronic  · Continue BiPAP qhs, will resume home setting of 10/5 as patient is stable     Hyponatremia  Assessment & Plan  · Resolved   · Na+ 137 this AM  · Continue to monitor daily BMP    Generalized osteoarthritis  Assessment & Plan  · Stable   · Continue Tylenol 650mg Q6H prn    Morbid obesity ([address] )  Assessment & Plan  · Likely due to limited physical activity due to MD   · Consult to Nutrition services placed · Advise weight loss and provide counseling       Global   Hep Lock   Replete electrolytes as necessary   Regular Diet   Lovenox and SCDs for DVT prophylaxis         Subjective:   Pt seen and examined at bedside  Daughter at bedside  Pt states she is feeling better overall today since admission  Was not able to fully tolerate BiPap overnight, stating that the setting was too high and it was "popping off"  She continues to cough but is not able to bring up any phlegm  She is complaining of constipation  Objective:     Vitals: Blood pressure 118/55, pulse 88, temperature 97 9 °F (36 6 °C), temperature source Oral, resp  rate 18, height 5' 7" (1 702 m), weight 105 kg (231 lb 14 8 oz), SpO2 95 %, not currently breastfeeding  ,Body mass index is 36 32 kg/m²    Wt Readings from Last 3 Encounters:   02/23/19 105 kg (231 lb 14 8 oz)   04/26/18 125 kg (275 lb)   07/29/16 89 kg (196 lb 3 4 oz)       Intake/Output Summary (Last 24 hours) at 2/24/2019 1254  Last data filed at 2/24/2019 1057  Gross per 24 hour   Intake 100 ml   Output 1100 ml   Net -1000 ml       Physical Exam: General appearance: acute and oriented   Lungs: decreased breath sounds, rhonchorous breath sounds   Heart: regular rate and rhythm  Abdomen: soft, non-tender; bowel sounds normal; no masses,  no organomegaly  Extremities: mild edema of b/l lower extremities  Musculoskeltal: Limited movement due to chronic MD     Recent Results (from the past 24 hour(s))   CBC and differential    Collection Time: 02/24/19  5:28 AM   Result Value Ref Range    WBC 4 54 4 31 - 10 16 Thousand/uL    RBC 4 23 3 81 - 5 12 Million/uL    Hemoglobin 11 3 (L) 11 5 - 15 4 g/dL    Hematocrit 36 9 34 8 - 46 1 %    MCV 87 82 - 98 fL    MCH 26 7 (L) 26 8 - 34 3 pg    MCHC 30 6 (L) 31 4 - 37 4 g/dL    RDW 14 9 11 6 - 15 1 %    MPV 8 8 (L) 8 9 - 12 7 fL    Platelets 111 067 - 158 Thousands/uL    nRBC 0 /100 WBCs    Neutrophils Relative 82 (H) 43 - 75 %    Immat GRANS % 1 0 - 2 % Lymphocytes Relative 12 (L) 14 - 44 %    Monocytes Relative 5 4 - 12 %    Eosinophils Relative 0 0 - 6 %    Basophils Relative 0 0 - 1 %    Neutrophils Absolute 3 73 1 85 - 7 62 Thousands/µL    Immature Grans Absolute 0 04 0 00 - 0 20 Thousand/uL    Lymphocytes Absolute 0 56 (L) 0 60 - 4 47 Thousands/µL    Monocytes Absolute 0 21 0 17 - 1 22 Thousand/µL    Eosinophils Absolute 0 00 0 00 - 0 61 Thousand/µL    Basophils Absolute 0 00 0 00 - 0 10 Thousands/µL   Magnesium    Collection Time: 02/24/19  5:28 AM   Result Value Ref Range    Magnesium 1 9 1 6 - 2 6 mg/dL   Phosphorus    Collection Time: 02/24/19  5:28 AM   Result Value Ref Range    Phosphorus 2 3 2 3 - 4 1 mg/dL   Basic metabolic panel    Collection Time: 02/24/19  5:28 AM   Result Value Ref Range    Sodium 137 136 - 145 mmol/L    Potassium 4 4 3 5 - 5 3 mmol/L    Chloride 97 (L) 100 - 108 mmol/L    CO2 37 (H) 21 - 32 mmol/L    ANION GAP 3 (L) 4 - 13 mmol/L    BUN 28 (H) 5 - 25 mg/dL    Creatinine 0 52 (L) 0 60 - 1 30 mg/dL    Glucose 144 (H) 65 - 140 mg/dL    Calcium 8 8 8 3 - 10 1 mg/dL    eGFR 86 ml/min/1 73sq m       Current Facility-Administered Medications   Medication Dose Route Frequency Provider Last Rate Last Dose    acetaminophen (TYLENOL) tablet 650 mg  650 mg Oral Q6H PRN Robert Carson PA-C   650 mg at 02/22/19 1803    acetylcysteine (MUCOMYST) 200 mg/mL inhalation solution 600 mg  3 mL Nebulization Q4H Jennifer Alvarenga DO   600 mg at 02/24/19 1149    azithromycin (ZITHROMAX) 500 mg in sodium chloride 0 9 % 250 mL IVPB  500 mg Intravenous Q24H Robert Carson PA-C 250 mL/hr at 02/24/19 0528 500 mg at 02/24/19 0528    benzonatate (TESSALON PERLES) capsule 100 mg  100 mg Oral TID PRN Apolonia Vaughn MD        celecoxib (CeleBREX) capsule 200 mg  200 mg Oral Daily Apolonia Vaughn MD   200 mg at 02/24/19 0476    diltiazem (CARDIZEM CD) 24 hr capsule 240 mg  240 mg Oral Daily Jennifer Knox PA-C   240 mg at 02/24/19 0935    docusate sodium (COLACE) capsule 100 mg  100 mg Oral BID Jennifer Alvarenga DO   100 mg at 02/24/19 1117    enoxaparin (LOVENOX) subcutaneous injection 40 mg  40 mg Subcutaneous Daily Jennifer Knox PA-C   40 mg at 02/24/19 0935    furosemide (LASIX) tablet 10 mg  10 mg Oral Every Other Day Tram Winslow PA-C   10 mg at 02/24/19 0936    levalbuterol (XOPENEX) inhalation solution 0 63 mg  0 63 mg Nebulization Q4H PRN Nanci Lindsey MD   0 63 mg at 02/24/19 1150    levalbuterol (XOPENEX) inhalation solution 0 63 mg  0 63 mg Nebulization 4x Daily Nanci Lindsey MD   0 63 mg at 02/24/19 1118    methylPREDNISolone sodium succinate (Solu-MEDROL) injection 40 mg  40 mg Intravenous Q12H [address] Jennifer Alvarenga DO        metoprolol succinate (TOPROL-XL) 24 hr tablet 25 mg  25 mg Oral BID Jennifer Knox PA-C   25 mg at 02/24/19 3234    nystatin (MYCOSTATIN) powder   Topical 4x Daily Tram Winslow PA-C        silver sulfadiazine (SILVADENE,SSD) 1 % cream   Topical Daily Tram Winslow PA-C           Invasive Devices     Peripherally Inserted Central Catheter Line            PICC Line 02/22/19 1 day          Drain            External Urinary Catheter less than 1 day                Lab, Imaging and other studies: I have personally reviewed pertinent reports      VTE Pharmacologic Prophylaxis: Enoxaparin (Lovenox)  VTE Mechanical Prophylaxis: sequential compression device    Jennifer Alvarenga DO

## 2019-02-24 NOTE — PLAN OF CARE
Problem: PAIN - ADULT  Goal: Verbalizes/displays adequate comfort level or baseline comfort level  Description  Interventions:  - Encourage patient to monitor pain and request assistance  - Assess pain using appropriate pain scale  - Administer analgesics based on type and severity of pain and evaluate response  - Implement non-pharmacological measures as appropriate and evaluate response  - Notify physician/advanced practitioner if interventions unsuccessful or patient reports new pain   Outcome: Progressing     Problem: INFECTION - ADULT  Goal: Absence or prevention of progression during hospitalization  Description  INTERVENTIONS:  - Assess and monitor for signs and symptoms of infection  - Monitor lab/diagnostic results  - Monitor all insertion sites, i e  indwelling lines, tubes, and drains  - Monitor nasal secretions for changes in amount and color  - Phil Campbell appropriate cooling/warming therapies per order  - Administer medications as ordered  - Instruct and encourage patient and family to use good hand hygiene technique  - Identify and instruct in appropriate isolation precautions for identified infection/condition   Outcome: Progressing     Problem: SAFETY ADULT  Goal: Maintain or return to baseline ADL function  Description  INTERVENTIONS:  -  Assess patient's ability to carry out ADLs; assess patient's baseline for ADL function and identify physical deficits which impact ability to perform ADLs (bathing, care of mouth/teeth, toileting, grooming, dressing, etc )  - Assess/evaluate cause of self-care deficits   - Assess range of motion  - Assess patient's mobility; develop plan if impaired  - Assess patient's need for assistive devices and provide as appropriate  - Encourage maximum independence but intervene and supervise when necessary  ¯ Involve family in performance of ADLs  ¯ Assess for home care needs following discharge   ¯ Request OT consult to assist with ADL evaluation and planning for discharge  ¯ Provide patient education as appropriate  Outcome: Progressing  Goal: Maintain or return mobility status to optimal level  Description  INTERVENTIONS:  - Assess patient's baseline mobility status (ambulation, transfers, stairs, etc )    - Identify cognitive and physical deficits and behaviors that affect mobility  - Identify mobility aids required to assist with transfers and/or ambulation (gait belt, sit-to-stand, lift, walker, cane, etc )  - [place] fall precautions as indicated by assessment  - Instruct patient to call for assistance with activity based on assessment  - Request Rehabilitation consult to assist with strengthening/weightbearing, etc    Outcome: Progressing     Problem: DISCHARGE PLANNING  Goal: Discharge to home or other facility with appropriate resources  Description  INTERVENTIONS:  - Identify barriers to discharge w/patient and caregiver  - Arrange for needed discharge resources and transportation as appropriate  - Identify discharge learning needs (meds, wound care, etc )  - Refer to Case Management Department for coordinating discharge planning if the patient needs post-hospital services based on physician/advanced practitioner order or complex needs related to functional status, cognitive ability, or social support system   Outcome: Progressing     Problem: Knowledge Deficit  Goal: Patient/family/caregiver demonstrates understanding of disease process, treatment plan, medications, and discharge instructions  Description  Complete learning assessment and assess knowledge base    Interventions:  - Provide teaching at level of understanding  - Provide teaching via preferred learning methods  Outcome: Progressing

## 2019-02-24 NOTE — PROGRESS NOTES
Daily Progress Note - Critical Care/ Stepdown   Rich Jaime 80 y o  female MRN: [id number]  Unit/Bed#: 2 [place] [address] Encounter: [id number]    ______________________________________________________________________  Assessment:   Principal Problem:    Acute on chronic respiratory failure with hypoxia and hypercapnia (HCC)  Active Problems:    Hyponatremia    Muscular dystrophy, limb girdle    PORTIA treated with BiPAP    Chronic diastolic heart failure (HCC)    Generalized osteoarthritis    Essential hypertension    Morbid obesity ([facility] [address] )  Resolved Problems:    * No resolved hospital problems  *          Plan:    Neuro:     · Delirium: CAM ICU positive no   · Regulate sleep/wake cycle  · Muscular dystrophy - mobility dysfunction at baseline - wheelchair bound    CV:   ·  Essential hypertension - continue home regimen  · Hemodynamics stable at this time    Pulm:   · Acute on chronic respiratory failure with hypercapnia - likely triggered by viral URI and bipap non-compliance  Pt is awake and alert after adjustment of  bipap settings  Continue bipap overnight    · PORTIA treated with BiPAP - continue bipap      GI:   · Nutrition/diet plan: House diet    FEN:   · No IVF - attempt gentle diuresis  · Fluid/Diuretic plan: Needs diuresis lasix IV  · Goal 24 hour fluid balance: net negative  · Electrolytes repleted: yes  · Goal: K >4 0, Mag >2 0, and Phos >3 0    :   ·  Hyponatremia - improving - no symptoms  · Indwelling Reese present: no     ID:   · No evidence of bacterial infection   · Flu/RSV negative   · Viral pathogen profile pending  · Trend temps and WBC count    Heme:     · Trend hgb and plts    Endo:   · Wean steroids as tolerated      Msk/Skin:    Muscular dystrophy, limb girdle - bed/wheelchair bound at home -   · Morbid obesity (HCC) - nutritional counseling  · Frequent turning and off-loading    Family:  · Family updated within 24 hours: yes   · Family meeting planned today: no     Lines:  · PIV    VTE Prophylaxis:  · Pharmacologic Prophylaxis: Enoxaparin (Lovenox)  · Mechanical Prophylaxis: sequential compression device    Disposition: Transfer to general medical floor    Code Status: Level 3 - DNAR and DNI    Counseling / Coordination of Care  Total time spent today 35 minutes  Greater than 50% of total time was spent with the patient and / or family counseling and / or coordination of care  A description of the counseling / coordination of care: d/w family, d/w attending, ayden machado, d/w patient  ______________________________________________________________________    HPI/24hr events: no acute o/n events    ______________________________________________________________________    Physical Exam:   Physical Exam   Constitutional: She appears well-developed and well-nourished  HENT:   Head: Normocephalic and atraumatic  Eyes: Pupils are equal, round, and reactive to light  No scleral icterus  Neck: Neck supple  No JVD present  Cardiovascular: Normal rate, regular rhythm and normal heart sounds  Pulmonary/Chest: Effort normal  She has decreased breath sounds  She has no wheezes  Abdominal: Soft  Bowel sounds are normal  She exhibits no distension  Musculoskeletal: She exhibits edema  Neurological: She is alert  She exhibits abnormal muscle tone  Coordination abnormal    Skin: Skin is warm and dry  Capillary refill takes less than 2 seconds  She is not diaphoretic  No pallor     Psychiatric: She has a normal mood and affect        ______________________________________________________________________  Vitals:    19 1545 19 1641 199   BP: 122/59 113/54  139/60   BP Location: Right arm Left arm  Left arm   Pulse: 93 86  88   Resp: (!) 38 22  22   Temp: 98 8 °F (37 1 °C) 98 5 °F (36 9 °C)  97 9 °F (36 6 °C)   TempSrc: Tympanic Oral  Oral   SpO2: 96% 93% 100% 94%   Weight:       Height:                  Temperature:   Temp (24hrs), Av 1 °F (36 7 °C), Min:97 7 °F (36 5 °C), Max:98 8 °F (37 1 °C)    Current Temperature: 97 9 °F (36 6 °C)    Weights:   IBW: 61 6 kg    Body mass index is 36 32 kg/m²  Weight (last 2 days)     Date/Time   Weight    02/23/19 0532   105 (231 92)    02/22/19 1100   104 (230 16)    02/22/19 0154   103 (226 41)                     Non-Invasive/Invasive Ventilation Settings:  Respiratory    Lab Data (Last 4 hours)    None         O2/Vent Data (Last 4 hours)      02/24 0400          Non-Invasive Ventilation Mode BiPAP                 No results found for: PHART, EHP5JXT, PO2ART, PSC5RJS, X7HDIEQS, BEART, SOURCE  SpO2: SpO2: 94 %, SpO2 Activity: SpO2 Activity: At Rest, SpO2 Device: O2 Device: Nasal cannula    Intake and Outputs:  I/O       02/22 0701 - 02/23 0700 02/23 0701 - 02/24 0700    P  O  920 970    I V  (mL/kg)  20 (0 2)    IV Piggyback 250     Total Intake(mL/kg) 1170 (11 1) 990 (9 4)    Urine (mL/kg/hr) 2175 (0 9) 1100 (0 4)    Total Output 2175 1100    Net -1005 -110                Nutrition:        Diet Orders   (From admission, onward)            Start     Ordered    02/22/19 1308  Diet Regular; Regular House  Diet effective now     Question Answer Comment   Diet Type Regular    Regular Regular House    RD to adjust diet per protocol?  Yes        02/22/19 1307    02/22/19 1126  Room Service  Once     Question:  Type of Service  Answer:  Room Service - Appropriate with Assistance    02/22/19 1126            Labs:   Results from last 7 days   Lab Units 02/24/19  0528 02/22/19  0539 02/21/19  2327   WBC Thousand/uL 4 54 9 14 9 63   HEMOGLOBIN g/dL 11 3* 12 9 13 3   HEMATOCRIT % 36 9 43 2 42 7   PLATELETS Thousands/uL 241 210 213   NEUTROS PCT % 82* 86* 85*   MONOS PCT % 5 5 8     Results from last 7 days   Lab Units 02/24/19  0528 02/22/19  0539 02/22/19  0037 02/21/19  2323   SODIUM mmol/L 137 132* 129*  --    POTASSIUM mmol/L 4 4 4 5 4 6  --    CHLORIDE mmol/L 97* 93* 92*  --    CO2 mmol/L 37* 36* 38*  --    CO2, I-STAT mmol/L  --   --   -- 39*   BUN mg/dL 28* 10 11  --    CREATININE mg/dL 0 52* 0 27* 0 25*  --    CALCIUM mg/dL 8 8 8 8 8 8  --    ALK PHOS U/L  --  104 108  --    ALT U/L  --  23 22  --    AST U/L  --  13 16  --    GLUCOSE, ISTAT mg/dl  --   --   --  121     Results from last 7 days   Lab Units 02/24/19  0528 02/22/19  0539   MAGNESIUM mg/dL 1 9 1 8     Lab Results   Component Value Date    PHOS 2 3 02/24/2019    PHOS 2 7 02/22/2019    PHOS 3 3 08/02/2016    PHOS 2 8 08/28/2015      Results from last 7 days   Lab Units 02/22/19  0037   INR  1 01   PTT seconds 28     0   Lab Value Date/Time    TROPONINI <0 02 02/22/2019 0037    TROPONINI <0 02 04/26/2018 2138    TROPONINI <0 02 07/28/2016 2131    TROPONINI <0 02 01/27/2016 1708         ABG:  Lab Results   Component Value Date    PHART 7 381 02/22/2019    BHZ5NDP 59 1 (HH) 02/22/2019    PO2ART 71 7 (L) 02/22/2019    LQH0RLK 34 3 (H) 02/22/2019    BEART 7 2 02/22/2019    SOURCE Radial, Right 02/22/2019       Imaging: CXR:   IR PICC line   Final Result   Successful placement of right arm midline catheter  The catheter is ready for use       _______________________________________________________________   PROCEDURE DETAILS:    Operators: Dr Jamal Khan, [facility] attending, performed the procedure  Anesthesia: 1% lidocaine was injected in the skin and subcutaneous tissues overlying the access site  Medications: 1% lidocaine   Contrast: 0 mL of Omnipaque 300   Fluoroscopy time: 0 minutes   Images: 2      COMMENTS:   Following the discussion of the risks, benefits and alternatives to the procedure, written informed consent was obtained from the patient and her daughter  The procedure was done at the bedside in the ICU  The right arm veins were evaluated as a    potential access site with ultrasound  The site was prepped and draped in the usual sterile fashion    All elements of maximal sterile barrier technique, cap and mask and sterile gown and sterile gloves and sterile full-body drape and hand hygiene and 2%    chlorhexidine for cutaneous antisepsis  Sterile ultrasound technique with sterile gel and sterile probe covers was also utilized  A preprocedure timeout was performed per [facility] protocol  Lidocaine was administered to the skin and a small skin incision was made  Under ultrasound guidance, the right basilic vein was punctured using a micropuncture set  Static images of real time needle entry into the vessel were obtained  A peel-away    sheath was then placed over a guidewire  A double lumen power catheter measuring   5 cm in length was then placed through the peel-away sheath  The peel-away sheath and guidewire were then removed  The catheter was aspirated and then flushed with    saline  The catheter was secured to the skin and a sterile dressing was applied  Workstation performed: [id number]         XR chest 1 view portable   Final Result   Stable cardiomegaly without acute pulmonary disease  Workstation performed: [id number]            I have personally reviewed pertinent reports  and I have personally reviewed pertinent films in PACS      Micro:  Lab Results   Component Value Date    BLOODCX No Growth After 5 Days  04/26/2018    BLOODCX No Growth After 5 Days  04/26/2018    BLOODCX No Growth After 5 Days  01/27/2016    BLOODCX No Growth After 5 Days  01/27/2016    URINECX >100,000 cfu/ml Escherichia coli (A) 04/26/2018    URINECX Culture results to follow   07/28/2016    URINECX >100,000 cfu/ml Escherichia coli 07/28/2016    URINECX 40,000-49,000 cfu/ml Klebsiella pneumoniae 07/28/2016    SPUTUMCULTUR 3+ Growth of Mixed Respiratory Antonella 01/31/2016       Allergies: No Known Allergies  Medications:   Scheduled Meds:  Current Facility-Administered Medications:  acetaminophen 650 mg Oral Q6H PRN Ofilia Harada, PA-C    acetylcysteine 3 mL Nebulization BID Estelle Hanley MD    azithromycin 500 mg Intravenous Q24H Ofilia Harada, PA-C Last Rate: 500 mg (02/24/19 0528)   benzonatate 100 mg Oral TID PRN Rema Waterman MD    celecoxib 200 mg Oral Daily Rema Waterman MD    diltiazem 240 mg Oral Daily Jennifer Knox PA-C    enoxaparin 40 mg Subcutaneous Daily Jennifer Knox PA-C    furosemide 10 mg Oral Every Other Day Jennifer Knox PA-C    levalbuterol 0 63 mg Nebulization Q4H PRN Rema Waterman MD    levalbuterol 0 63 mg Nebulization 4x Daily Rema Waterman MD    methylPREDNISolone sodium succinate 60 mg Intravenous Q12H [address] Jennifer Knox PA-C    metoprolol succinate 25 mg Oral BID Jennifer Knox PA-C    nystatin  Topical 4x Daily Jennifer Knox PA-C    silver sulfadiazine  Topical Daily Jennifer Knox PA-C      Continuous Infusions:   PRN Meds:    acetaminophen 650 mg Q6H PRN   benzonatate 100 mg TID PRN   levalbuterol 0 63 mg Q4H PRN       Invasive lines and devices:   Invasive Devices     Peripherally Inserted Central Catheter Line            PICC Line 02/22/19 1 day          Drain            External Urinary Catheter less than 1 day                   SIGNATURE: Gloria Wong PA-C  DATE: February 24, 2019

## 2019-02-24 NOTE — PROGRESS NOTES
Progress Note - Pulmonary   Tosha Galdamez 80 y o  female MRN: [id number]  Unit/Bed#: [address] Encounter: [id number]      Assessment/Plan:     1  Acute hypercarbic respiratory failure secondary to acute bronchitis  Continue with nebulizer therapy  Add atrovent  To complete 5 days of azithromycin  Start Breo  Patient has chronic bronchitis- Breo may help with this  2  Continue with BiPAP for hypercarbia        ______________________________________________________________________    Subjective: Pt seen and examined at bedside  +cough  Not waking her up at night  She is using BiPAP at night  Not bringing up much mucus  Doing better with mucomyst    Tele Events:     Vitals:   Temp:  [97 9 °F (36 6 °C)-98 8 °F (37 1 °C)] 98 2 °F (36 8 °C)  HR:  [82-93] 93  Resp:  [18-38] 20  BP: (113-139)/(54-60) 123/56  Weight (last 2 days)     Date/Time   Weight    02/23/19 0532   105 (231 92)    02/22/19 1100   104 (230 16)    02/22/19 0154   103 (226 41)            Oxygen Therapy  SpO2: 92 %  O2 Flow Rate (L/min): 2 L/min    Nutrition:        Diet Orders   (From admission, onward)            Start     Ordered    02/22/19 1308  Diet Regular; Regular House  Diet effective now     Question Answer Comment   Diet Type Regular    Regular Regular House    RD to adjust diet per protocol? Yes        02/22/19 1307    02/22/19 1126  Room Service  Once     Question:  Type of Service  Answer:  Room Service - Appropriate with Assistance    02/22/19 1126          Ins/Outs:   I/O       02/22 0701 - 02/23 0700 02/23 0701 - 02/24 0700 02/24 0701 - 02/25 0700    P  O  920 970     I V  (mL/kg)  20 (0 2)     IV Piggyback 250      Total Intake(mL/kg) 1170 (11 1) 990 (9 4)     Urine (mL/kg/hr) 2175 (0 9) 1100 (0 4) 450 (0 5)    Total Output 2175 1100 450    Net -1005 -110 -450                 Lines/Drains:  Invasive Devices     Peripherally Inserted Central Catheter Line            PICC Line 02/22/19 1 day          Drain            External Urinary Catheter less than 1 day                 Active medications:  Scheduled Meds:  Current Facility-Administered Medications:  acetaminophen 650 mg Oral Q6H PRN Wilbur Oppenheim, PA-C    acetylcysteine 3 mL Nebulization Q4H Jennifer Alvarenga,     azithromycin 500 mg Intravenous Q24H Jeninfer Knox PA-C Last Rate: 500 mg (02/24/19 0528)   benzonatate 100 mg Oral TID PRN Km Ha MD    celecoxib 200 mg Oral Daily Km Ha MD    diltiazem 240 mg Oral Daily Jennifer Knox PA-C    docusate sodium 100 mg Oral BID Jennifer Alvarenga,     enoxaparin 40 mg Subcutaneous Daily Jennifer Knox PA-C    furosemide 10 mg Oral Every Other Day Jennifer Knox PA-C    levalbuterol 0 63 mg Nebulization Q4H PRN Km Ha MD    levalbuterol 0 63 mg Nebulization 4x Daily Km Ha MD    methylPREDNISolone sodium succinate 40 mg Intravenous Q12H [address] Jennifer Alvarenga DO    metoprolol succinate 25 mg Oral BID Jennifer Knox PA-C    nystatin  Topical 4x Daily Jennifer Knox PA-C    silver sulfadiazine  Topical Daily Jennifer Knox PA-C      PRN Meds:  acetaminophen 650 mg Q6H PRN   benzonatate 100 mg TID PRN   levalbuterol 0 63 mg Q4H PRN     ____________________________________________________________________      Physical Exam   Constitutional: She is oriented to person, place, and time  She appears well-developed and well-nourished  No distress  HENT:   Head: Atraumatic  Mouth/Throat: No oropharyngeal exudate  Eyes: EOM are normal    Neck: Neck supple  Cardiovascular:   No murmur heard  Pulmonary/Chest:   Coarse breath sounds on deep exhalation  +scattered end exp wheezing   Abdominal: Soft  Musculoskeletal: She exhibits no edema  Neurological: She is alert and oriented to person, place, and time  Skin: Skin is warm  Psychiatric: She has a normal mood and affect   Her behavior is normal    Vitals reviewed  ____________________________________________________________________    Labs:   CBC: Results from last 7 days   Lab Units 02/24/19 0528 02/22/19  0539 02/21/19  2327   WBC Thousand/uL 4 54 9 14 9 63   HEMOGLOBIN g/dL 11 3* 12 9 13 3   HEMATOCRIT % 36 9 43 2 42 7   MCV fL 87 89 85   PLATELETS Thousands/uL 241 210 213     CMP: Results from last 7 days   Lab Units 02/24/19 0528 02/22/19 0539 02/22/19 0037 02/21/19  2323   POTASSIUM mmol/L 4 4 4 5 4 6  --   --    CHLORIDE mmol/L 97* 93* 92*  --   --    CO2 mmol/L 37* 36* 38*  --   --    CO2, I-STAT mmol/L  --   --   --   --  39*   BUN mg/dL 28* 10 11  --   --    CREATININE mg/dL 0 52* 0 27* 0 25*  --   --    GLUCOSE, ISTAT mg/dl  --   --   --   --  121   CALCIUM mg/dL 8 8 8 8 8 8   < >  --    AST U/L  --  13 16  --   --    ALT U/L  --  23 22  --   --    ALK PHOS U/L  --  104 108   < >  --    EGFR ml/min/1 73sq m 86 107 110   < >  --     < > = values in this interval not displayed  No components found for: ABG    Magnesium:   Results from last 7 days   Lab Units 02/24/19  0528   MAGNESIUM mg/dL 1 9     Phosphorous:   Results from last 7 days   Lab Units 02/24/19 0528   PHOSPHORUS mg/dL 2 3     Troponin:   Results from last 7 days   Lab Units 02/22/19 0037   TROPONIN I ng/mL <0 02     PT/INR:   Results from last 7 days   Lab Units 02/22/19 0037   PTT seconds 28   INR  1 01     Lactic Acid:     BNP:   Results from last 7 days   Lab Units 02/22/19 0037   NT-PRO BNP pg/mL 873*     TSH:     Procalcitonin: Invalid input(s): PROCALCITONIN      Imaging:   IR PICC line   Final Result by Margo Blanco DO (02/22 1732)   Successful placement of right arm midline catheter  The catheter is ready for use       _______________________________________________________________   PROCEDURE DETAILS:    Operators: Dr Ashlee Smallwood, [address] [place] [address] attending, performed the procedure     Anesthesia: 1% lidocaine was injected in the skin and subcutaneous tissues overlying the access site  Medications: 1% lidocaine   Contrast: 0 mL of Omnipaque 300   Fluoroscopy time: 0 minutes   Images: 2      COMMENTS:   Following the discussion of the risks, benefits and alternatives to the procedure, written informed consent was obtained from the patient and her daughter  The procedure was done at the bedside in the ICU  The right arm veins were evaluated as a    potential access site with ultrasound  The site was prepped and draped in the usual sterile fashion  All elements of maximal sterile barrier technique, cap and mask and sterile gown and sterile gloves and sterile full-body drape and hand hygiene and 2%    chlorhexidine for cutaneous antisepsis  Sterile ultrasound technique with sterile gel and sterile probe covers was also utilized  A preprocedure timeout was performed per [facility] protocol  Lidocaine was administered to the skin and a small skin incision was made  Under ultrasound guidance, the right basilic vein was punctured using a micropuncture set  Static images of real time needle entry into the vessel were obtained  A peel-away    sheath was then placed over a guidewire  A double lumen power catheter measuring   5 cm in length was then placed through the peel-away sheath  The peel-away sheath and guidewire were then removed  The catheter was aspirated and then flushed with    saline  The catheter was secured to the skin and a sterile dressing was applied  Workstation performed: [id number]         XR chest 1 view portable   Final Result by Martha Cruz MD (02/22 0848)   Stable cardiomegaly without acute pulmonary disease  Workstation performed: [id number]               Micro: Lab Results   Component Value Date    BLOODCX No Growth After 5 Days  04/26/2018    BLOODCX No Growth After 5 Days  04/26/2018    BLOODCX No Growth After 5 Days  01/27/2016    BLOODCX No Growth After 5 Days   01/27/2016    URINECX >100,000 cfu/ml Escherichia coli (A) 04/26/2018    URINECX Culture results to follow   07/28/2016    URINECX >100,000 cfu/ml Escherichia coli 07/28/2016    URINECX 40,000-49,000 cfu/ml Klebsiella pneumoniae 07/28/2016    SPUTUMCULTUR 3+ Growth of Mixed Respiratory Antonella 01/31/2016    MRSACULTURE  02/22/2019     No Methicillin Resistant Staphlyococcus aureus (MRSA) isolated      Results from last 7 days   Lab Units 02/22/19  0229   INFLUENZA B PCR  Not Detected   RSV PCR  Not Detected           Code Status: Level 3 - DNAR and DNI

## 2019-02-25 ENCOUNTER — APPOINTMENT (INPATIENT)
Dept: RADIOLOGY | Facility: HOSPITAL | Age: 84
DRG: 202 | End: 2019-02-25
Payer: MEDICARE

## 2019-02-25 PROBLEM — J20.9 ACUTE BRONCHITIS: Status: ACTIVE | Noted: 2019-02-25

## 2019-02-25 LAB
ADENOVIRUS: NOT DETECTED
ANION GAP SERPL CALCULATED.3IONS-SCNC: -1 MMOL/L (ref 4–13)
BASOPHILS # BLD AUTO: 0.01 THOUSANDS/ΜL (ref 0–0.1)
BASOPHILS NFR BLD AUTO: 0 % (ref 0–1)
BUN SERPL-MCNC: 28 MG/DL (ref 5–25)
C PNEUM DNA SPEC QL NAA+PROBE: NOT DETECTED
CALCIUM SERPL-MCNC: 8.7 MG/DL (ref 8.3–10.1)
CHLORIDE SERPL-SCNC: 99 MMOL/L (ref 100–108)
CO2 SERPL-SCNC: 39 MMOL/L (ref 21–32)
CREAT SERPL-MCNC: 0.5 MG/DL (ref 0.6–1.3)
EOSINOPHIL # BLD AUTO: 0 THOUSAND/ΜL (ref 0–0.61)
EOSINOPHIL NFR BLD AUTO: 0 % (ref 0–6)
ERYTHROCYTE [DISTWIDTH] IN BLOOD BY AUTOMATED COUNT: 15 % (ref 11.6–15.1)
FLUAV H1 RNA SPEC QL NAA+PROBE: NOT DETECTED
FLUAV H3 RNA SPEC QL NAA+PROBE: NOT DETECTED
FLUAV RNA SPEC QL NAA+PROBE: NOT DETECTED
FLUBV RNA SPEC QL NAA+PROBE: NOT DETECTED
GFR SERPL CREATININE-BSD FRML MDRD: 87 ML/MIN/1.73SQ M
GLUCOSE SERPL-MCNC: 144 MG/DL (ref 65–140)
HBOV DNA SPEC QL NAA+PROBE: NOT DETECTED
HCOV 229E RNA SPEC QL NAA+PROBE: NOT DETECTED
HCOV HKU1 RNA SPEC QL NAA+PROBE: NOT DETECTED
HCOV NL63 RNA SPEC QL NAA+PROBE: NOT DETECTED
HCOV OC43 RNA SPEC QL NAA+PROBE: NOT DETECTED
HCT VFR BLD AUTO: 39.4 % (ref 34.8–46.1)
HGB BLD-MCNC: 11.7 G/DL (ref 11.5–15.4)
HPIV1 RNA SPEC QL NAA+PROBE: NOT DETECTED
HPIV2 RNA SPEC QL NAA+PROBE: NOT DETECTED
HPIV3 RNA SPEC QL NAA+PROBE: NOT DETECTED
HPIV4 RNA SPEC QL NAA+PROBE: NOT DETECTED
IMM GRANULOCYTES # BLD AUTO: 0.12 THOUSAND/UL (ref 0–0.2)
IMM GRANULOCYTES NFR BLD AUTO: 2 % (ref 0–2)
LYMPHOCYTES # BLD AUTO: 0.53 THOUSANDS/ΜL (ref 0.6–4.47)
LYMPHOCYTES NFR BLD AUTO: 7 % (ref 14–44)
M PNEUMO DNA SPEC QL NAA+PROBE: NOT DETECTED
MAGNESIUM SERPL-MCNC: 2 MG/DL (ref 1.6–2.6)
MCH RBC QN AUTO: 26.4 PG (ref 26.8–34.3)
MCHC RBC AUTO-ENTMCNC: 29.7 G/DL (ref 31.4–37.4)
MCV RBC AUTO: 89 FL (ref 82–98)
METAPNEUMOVIRUS: NOT DETECTED
MONOCYTES # BLD AUTO: 0.38 THOUSAND/ΜL (ref 0.17–1.22)
MONOCYTES NFR BLD AUTO: 5 % (ref 4–12)
NEUTROPHILS # BLD AUTO: 6.19 THOUSANDS/ΜL (ref 1.85–7.62)
NEUTS SEG NFR BLD AUTO: 86 % (ref 43–75)
NRBC BLD AUTO-RTO: 0 /100 WBCS
PHOSPHATE SERPL-MCNC: 2.7 MG/DL (ref 2.3–4.1)
PLATELET # BLD AUTO: 248 THOUSANDS/UL (ref 149–390)
PMV BLD AUTO: 8.9 FL (ref 8.9–12.7)
POTASSIUM SERPL-SCNC: 4.7 MMOL/L (ref 3.5–5.3)
RBC # BLD AUTO: 4.43 MILLION/UL (ref 3.81–5.12)
RHINOVIRUS RNA SPEC QL NAA+PROBE: NOT DETECTED
RSV A RNA SPEC QL NAA+PROBE: NOT DETECTED
RSV B RNA SPEC QL NAA+PROBE: NOT DETECTED
SODIUM SERPL-SCNC: 137 MMOL/L (ref 136–145)
WBC # BLD AUTO: 7.23 THOUSAND/UL (ref 4.31–10.16)

## 2019-02-25 PROCEDURE — 94640 AIRWAY INHALATION TREATMENT: CPT

## 2019-02-25 PROCEDURE — 94760 N-INVAS EAR/PLS OXIMETRY 1: CPT

## 2019-02-25 PROCEDURE — 99222 1ST HOSP IP/OBS MODERATE 55: CPT | Performed by: INTERNAL MEDICINE

## 2019-02-25 PROCEDURE — 71045 X-RAY EXAM CHEST 1 VIEW: CPT

## 2019-02-25 PROCEDURE — 80048 BASIC METABOLIC PNL TOTAL CA: CPT | Performed by: STUDENT IN AN ORGANIZED HEALTH CARE EDUCATION/TRAINING PROGRAM

## 2019-02-25 PROCEDURE — 84100 ASSAY OF PHOSPHORUS: CPT | Performed by: STUDENT IN AN ORGANIZED HEALTH CARE EDUCATION/TRAINING PROGRAM

## 2019-02-25 PROCEDURE — 99232 SBSQ HOSP IP/OBS MODERATE 35: CPT | Performed by: INTERNAL MEDICINE

## 2019-02-25 PROCEDURE — 99232 SBSQ HOSP IP/OBS MODERATE 35: CPT | Performed by: FAMILY MEDICINE

## 2019-02-25 PROCEDURE — 83735 ASSAY OF MAGNESIUM: CPT | Performed by: STUDENT IN AN ORGANIZED HEALTH CARE EDUCATION/TRAINING PROGRAM

## 2019-02-25 PROCEDURE — 85025 COMPLETE CBC W/AUTO DIFF WBC: CPT | Performed by: STUDENT IN AN ORGANIZED HEALTH CARE EDUCATION/TRAINING PROGRAM

## 2019-02-25 PROCEDURE — 94668 MNPJ CHEST WALL SBSQ: CPT

## 2019-02-25 PROCEDURE — 94660 CPAP INITIATION&MGMT: CPT

## 2019-02-25 RX ORDER — ACETYLCYSTEINE 200 MG/ML
3 SOLUTION ORAL; RESPIRATORY (INHALATION)
Status: DISCONTINUED | OUTPATIENT
Start: 2019-02-25 | End: 2019-02-28 | Stop reason: HOSPADM

## 2019-02-25 RX ORDER — GUAIFENESIN 600 MG
600 TABLET, EXTENDED RELEASE 12 HR ORAL EVERY 12 HOURS SCHEDULED
Status: DISCONTINUED | OUTPATIENT
Start: 2019-02-25 | End: 2019-02-28 | Stop reason: HOSPADM

## 2019-02-25 RX ORDER — METHYLPREDNISOLONE SODIUM SUCCINATE 40 MG/ML
20 INJECTION, POWDER, LYOPHILIZED, FOR SOLUTION INTRAMUSCULAR; INTRAVENOUS EVERY 12 HOURS SCHEDULED
Status: DISCONTINUED | OUTPATIENT
Start: 2019-02-25 | End: 2019-02-27

## 2019-02-25 RX ORDER — POLYETHYLENE GLYCOL 3350 17 G/17G
17 POWDER, FOR SOLUTION ORAL ONCE
Status: COMPLETED | OUTPATIENT
Start: 2019-02-25 | End: 2019-02-25

## 2019-02-25 RX ORDER — LEVALBUTEROL INHALATION SOLUTION 0.63 MG/3ML
0.63 SOLUTION RESPIRATORY (INHALATION)
Status: DISCONTINUED | OUTPATIENT
Start: 2019-02-25 | End: 2019-02-28 | Stop reason: HOSPADM

## 2019-02-25 RX ORDER — FUROSEMIDE 10 MG/ML
40 INJECTION INTRAMUSCULAR; INTRAVENOUS ONCE
Status: COMPLETED | OUTPATIENT
Start: 2019-02-25 | End: 2019-02-25

## 2019-02-25 RX ADMIN — CELECOXIB 200 MG: 100 CAPSULE ORAL at 08:39

## 2019-02-25 RX ADMIN — FUROSEMIDE 40 MG: 10 INJECTION, SOLUTION INTRAMUSCULAR; INTRAVENOUS at 23:13

## 2019-02-25 RX ADMIN — LEVALBUTEROL HYDROCHLORIDE 0.63 MG: 0.63 SOLUTION RESPIRATORY (INHALATION) at 07:28

## 2019-02-25 RX ADMIN — GUAIFENESIN 600 MG: 600 TABLET, EXTENDED RELEASE ORAL at 22:10

## 2019-02-25 RX ADMIN — METOPROLOL SUCCINATE 25 MG: 25 TABLET, EXTENDED RELEASE ORAL at 08:40

## 2019-02-25 RX ADMIN — ACETYLCYSTEINE 600 MG: 200 SOLUTION ORAL; RESPIRATORY (INHALATION) at 07:28

## 2019-02-25 RX ADMIN — IPRATROPIUM BROMIDE 0.5 MG: 0.5 SOLUTION RESPIRATORY (INHALATION) at 07:27

## 2019-02-25 RX ADMIN — METOPROLOL SUCCINATE 25 MG: 25 TABLET, EXTENDED RELEASE ORAL at 18:08

## 2019-02-25 RX ADMIN — METHYLPREDNISOLONE SODIUM SUCCINATE 40 MG: 40 INJECTION, POWDER, FOR SOLUTION INTRAMUSCULAR; INTRAVENOUS at 08:40

## 2019-02-25 RX ADMIN — LEVALBUTEROL HYDROCHLORIDE 0.63 MG: 0.63 SOLUTION RESPIRATORY (INHALATION) at 20:17

## 2019-02-25 RX ADMIN — NYSTATIN: 100000 POWDER TOPICAL at 18:10

## 2019-02-25 RX ADMIN — DOCUSATE SODIUM 100 MG: 100 CAPSULE, LIQUID FILLED ORAL at 18:08

## 2019-02-25 RX ADMIN — DILTIAZEM HYDROCHLORIDE 240 MG: 240 CAPSULE, COATED, EXTENDED RELEASE ORAL at 08:39

## 2019-02-25 RX ADMIN — NYSTATIN: 100000 POWDER TOPICAL at 08:41

## 2019-02-25 RX ADMIN — ACETYLCYSTEINE 600 MG: 200 SOLUTION ORAL; RESPIRATORY (INHALATION) at 11:19

## 2019-02-25 RX ADMIN — IPRATROPIUM BROMIDE 0.5 MG: 0.5 SOLUTION RESPIRATORY (INHALATION) at 20:17

## 2019-02-25 RX ADMIN — METHYLPREDNISOLONE SODIUM SUCCINATE 20 MG: 40 INJECTION, POWDER, FOR SOLUTION INTRAMUSCULAR; INTRAVENOUS at 22:13

## 2019-02-25 RX ADMIN — FLUTICASONE FUROATE AND VILANTEROL TRIFENATATE 1 PUFF: 100; 25 POWDER RESPIRATORY (INHALATION) at 08:40

## 2019-02-25 RX ADMIN — LEVALBUTEROL HYDROCHLORIDE 0.63 MG: 0.63 SOLUTION RESPIRATORY (INHALATION) at 11:18

## 2019-02-25 RX ADMIN — AZITHROMYCIN MONOHYDRATE 500 MG: 500 INJECTION, POWDER, LYOPHILIZED, FOR SOLUTION INTRAVENOUS at 06:27

## 2019-02-25 RX ADMIN — GUAIFENESIN 600 MG: 600 TABLET, EXTENDED RELEASE ORAL at 14:59

## 2019-02-25 RX ADMIN — DOCUSATE SODIUM 100 MG: 100 CAPSULE, LIQUID FILLED ORAL at 08:39

## 2019-02-25 RX ADMIN — IPRATROPIUM BROMIDE 0.5 MG: 0.5 SOLUTION RESPIRATORY (INHALATION) at 11:18

## 2019-02-25 RX ADMIN — NYSTATIN: 100000 POWDER TOPICAL at 12:52

## 2019-02-25 RX ADMIN — POLYETHYLENE GLYCOL 3350 17 G: 17 POWDER, FOR SOLUTION ORAL at 14:59

## 2019-02-25 RX ADMIN — ENOXAPARIN SODIUM 40 MG: 40 INJECTION SUBCUTANEOUS at 08:40

## 2019-02-25 RX ADMIN — ACETYLCYSTEINE 600 MG: 200 SOLUTION ORAL; RESPIRATORY (INHALATION) at 20:16

## 2019-02-25 RX ADMIN — SILVER SULFADIAZINE: 10 CREAM TOPICAL at 08:41

## 2019-02-25 NOTE — PROGRESS NOTES
[facility] Practice Progress Note - Brent See 80 y o  female MRN: [id number]    Unit/Bed#: [address] Encounter: [id number]      Assessment/Plan:  * Acute on chronic respiratory failure with hypoxia and hypercapnia (HCC)  Assessment & Plan  Improving   H/o chronic respiratory failure 2/2 muscular dystrophy vs obesity hypoventilation syndrome presents with SOB & hypoxia likely due to bronchitis  Continue home BiPAP setting of 10/5 tonight   · Oxygen supplementation via NC of 2L, will continue to monitor and titrate as necessary   · Labs WNL   · Vitals show episodes of elevated BP overnight    · Continue IV Azithromycin, Day #4 today   · Solumedrol decreased to 40mg BID, will continue this today and titrate as necessary   · Continue mucomyst Q4H with every treatment of Xopenex   · Chest PT protocol started   · CXR-stable cardiomegaly without acute pulmonary disease  · Influenza/RSV PCR, & urine strep/legionella negative   · Pulmonology consult placed and following, recommendations appreciated     Essential hypertension  Assessment & Plan  · 133/59 this AM   · Pt had several episodes of elevated blood pressure overnight   · Cardiology consultation placed   · Continue home Lasix 10mg every other day, Toprol XL 25mg PO BID & Cardizem 240mg PO qd  · Continue to monitor vitals q routine shift       Muscular dystrophy, 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  · Pt has been bed bound for > 20 years  · Family refusing outpatient rehabilitation, will be discharged to home when stable     Chronic diastolic heart failure ([facility] [address] )  Assessment & Plan  · Echo in 2016 - EF of 60-65% with grade 2 diastolic dysfunction  Normal systolic function     · Continue home Lasix 10mg every other day, last dose 2/24  · Toprol XL 25mg BID, & Cardizem 240mg PO qd  · Pt follows outpatient with Dr Jalil Ibrahim, consult placed to Cardiology while patient is inpatient     PORTIA treated with BiPAP  Assessment & Plan  · Chronic  · Continue BiPAP qhs, pt tolerated well overnight   · Continue home setting of 10/5      Hyponatremia  Assessment & Plan  · Resolved   · Na+ stable at 137 this AM  · Continue to monitor daily BMP    Generalized osteoarthritis  Assessment & Plan  · Stable   · Controlled with Tylenol 650mg Q6H prn    Morbid obesity ([address] )  Assessment & Plan  · Pt has limited activity due to MD   · Consult to Nutrition services placed   · Advise weight loss         Subjective:   Patient seen and examined at bedside  Pt was on BiPAP overnight and tolerated it well  Using oxygen supplementation via nasal cannula  She denies worsening of shortness of breath today  She slept well  Denies nausea, chest pain and abdominal pain  Objective:     Vitals: Blood pressure 147/67, pulse 95, temperature 97 8 °F (36 6 °C), temperature source Oral, resp  rate 18, height 5' 7" (1 702 m), weight 105 kg (231 lb 14 8 oz), SpO2 92 %, not currently breastfeeding  ,Body mass index is 36 32 kg/m²    Wt Readings from Last 3 Encounters:   02/23/19 105 kg (231 lb 14 8 oz)   04/26/18 125 kg (275 lb)   07/29/16 89 kg (196 lb 3 4 oz)       Intake/Output Summary (Last 24 hours) at 2/25/2019 4817  Last data filed at 2/25/2019 0222  Gross per 24 hour   Intake    Output 1250 ml   Net -1250 ml       Physical Exam: General appearance: alert  Lungs: decreased breath sounds, crackles and rhonchoorous breath L>R  Heart: regular rate and rhythm  Abdomen: soft, obese, positive bowel sounds, non tender   Extremities: edema bilateral   MSL: Pt is bedbound, decreased mobility and strength in limb     Recent Results (from the past 24 hour(s))   CBC and differential    Collection Time: 02/25/19  6:31 AM   Result Value Ref Range    WBC 7 23 4 31 - 10 16 Thousand/uL    RBC 4 43 3 81 - 5 12 Million/uL    Hemoglobin 11 7 11 5 - 15 4 g/dL    Hematocrit 39 4 34 8 - 46 1 %    MCV 89 82 - 98 fL    MCH 26 4 (L) 26 8 - 34 3 pg    MCHC 29 7 (L) 31 4 - 37 4 g/dL    RDW 15 0 11 6 - 15 1 %    MPV 8 9 8 9 - 12 7 fL    Platelets 353 143 - 931 Thousands/uL    nRBC 0 /100 WBCs    Neutrophils Relative 86 (H) 43 - 75 %    Immat GRANS % 2 0 - 2 %    Lymphocytes Relative 7 (L) 14 - 44 %    Monocytes Relative 5 4 - 12 %    Eosinophils Relative 0 0 - 6 %    Basophils Relative 0 0 - 1 %    Neutrophils Absolute 6 19 1 85 - 7 62 Thousands/µL    Immature Grans Absolute 0 12 0 00 - 0 20 Thousand/uL    Lymphocytes Absolute 0 53 (L) 0 60 - 4 47 Thousands/µL    Monocytes Absolute 0 38 0 17 - 1 22 Thousand/µL    Eosinophils Absolute 0 00 0 00 - 0 61 Thousand/µL    Basophils Absolute 0 01 0 00 - 0 10 Thousands/µL       Current Facility-Administered Medications   Medication Dose Route Frequency Provider Last Rate Last Dose    acetaminophen (TYLENOL) tablet 650 mg  650 mg Oral Q6H PRN Lobo Pisano PA-C   650 mg at 02/22/19 1803    acetylcysteine (MUCOMYST) 200 mg/mL inhalation solution 600 mg  3 mL Nebulization 4x Daily Nadira Haley DO        azithromycin (ZITHROMAX) 500 mg in sodium chloride 0 9 % 250 mL IVPB  500 mg Intravenous Q24H Lobomichael Pisano PA-C 250 mL/hr at 02/25/19 0627 500 mg at 02/25/19 1126    benzonatate (TESSALON PERLES) capsule 100 mg  100 mg Oral TID PRN Debi Walker MD        celecoxib (CeleBREX) capsule 200 mg  200 mg Oral Daily Debi Walker MD   200 mg at 02/24/19 3067    diltiazem (CARDIZEM CD) 24 hr capsule 240 mg  240 mg Oral Daily Jennifer Knox PA-C   240 mg at 02/24/19 0935    docusate sodium (COLACE) capsule 100 mg  100 mg Oral BID Jennifer Alvarenga DO   100 mg at 02/24/19 1841    enoxaparin (LOVENOX) subcutaneous injection 40 mg  40 mg Subcutaneous Daily Jennifer Knox PA-C   40 mg at 02/24/19 0935    fluticasone-vilanterol (BREO ELLIPTA) 100-25 mcg/inh inhaler 1 puff  1 puff Inhalation Daily Mario Mooney MD        furosemide (LASIX) tablet 10 mg  10 mg Oral Every Other Day Lobo Norris, PA-C   10 mg at 02/24/19 0936    ipratropium (ATROVENT) 0 02 % inhalation solution 0 5 mg  0 5 mg Nebulization 4x Daily Jo-Ann Serna MD   0 5 mg at 02/24/19 1936    levalbuterol [facility]) inhalation solution 0 63 mg  0 63 mg Nebulization Q4H PRN Lyubov Sánchez MD   0 63 mg at 02/24/19 1150    levalbuterol (XOPENEX) inhalation solution 0 63 mg  0 63 mg Nebulization 4x Daily Lyubov Sánchez MD   0 63 mg at 02/24/19 1936    methylPREDNISolone sodium succinate (Solu-MEDROL) injection 40 mg  40 mg Intravenous Q12H [facility] Jennifer Alvarenga DO   40 mg at 02/24/19 2159    metoprolol succinate (TOPROL-XL) 24 hr tablet 25 mg  25 mg Oral BID Lawrence Larson PA-C   25 mg at 02/24/19 1841    nystatin (MYCOSTATIN) powder   Topical 4x Daily Lawrence Larson PA-C        silver sulfadiazine (SILVADENE,SSD) 1 % cream   Topical Daily Lawrence Larson PA-C           Invasive Devices     Peripherally Inserted Central Catheter Line            PICC Line 02/22/19 2 days          Drain            External Urinary Catheter 1 day                Lab, Imaging and other studies: I have personally reviewed pertinent reports      VTE Pharmacologic Prophylaxis: Enoxaparin (Lovenox)  VTE Mechanical Prophylaxis: sequential compression device    Jennifer Alvarenga DO

## 2019-02-25 NOTE — PROGRESS NOTES
DASH discussion completed  Discussed goals of making sure pt's needs are met upon discharge, pt's preferences are taken into account, pt understands her health condition, medications and symptoms to watch for after returning home and pt is aware of any follow up appointments recommended by hospital physician  02/25/19 [address]    Patient Information   Mental Status Alert   Primary Caregiver Family   Accompanied by/Relationship dtr   Support System Immediate family   Legal Information   Advance Directives Living will;Power of  for health care  (do not have and declined information)   Activities of Daily Living Prior to Admission   Functional Status Minimum assistance   Assistive Device Walker   Living Arrangement House   Ambulation Moderate assistance   Means of Transportation   Means of Transport to [facility]: Family     CM and SW spoke with the pt and dtr in the room  Pt lives with her family and has [employer] program which offers her services in the home and covers her for her DME in the home  Family did not feel that they had additional DME needs  They would like follow up with [facility] to be scheduled upon DC, referral out via [employer]  Pt does not drive, her family is her care givers and assist with her transport needs when able  Pt does not have Advanced Directives and have declined information on this  Pt does have oxygen through [address]  CM spoke with Pulm, would like cough assist device for this pt at home and form sent to the office for order completions  [employer] referral to go out for this DME  Pt is a MBP pt, letter discussed and provided to the dtr

## 2019-02-25 NOTE — RESPIRATORY THERAPY NOTE
Pt daughter had asked me about NT sx, I told her I did not feel that it would be beneficial at this point because her breath sounds do not sound like secretions would be accessible fredrick when the pt is not willing to try this

## 2019-02-25 NOTE — ASSESSMENT & PLAN NOTE
-patient utilizing BiPAP at night at her home dose of 10/5  -her hypercapnia appears to be stable , acute portion as resolved  -hypoxemia is improving now requiring 2 liters/minute down from 3 L  -chest x-ray with asymmetric pulmonary edema with question of atelectasis versus pneumonia  -procalcitonin remains less than 0 05  -no fevers  -maintain BiPAP  -maintain incentive spirometry  -maintain cough assist

## 2019-02-25 NOTE — ASSESSMENT & PLAN NOTE
-patient has a history of muscular dystrophy  -I did order a cough assist for this patient  -case management following  -verify that this is pending delivery when patient gets home

## 2019-02-25 NOTE — CONSULTS
Consultation - Cardiology   [facility]     Tosha Galdamez 80 y o  female MRN: [id number]  : 8/10/1931  Unit/Bed#: 2 [place] [address] Encounter: [id number]      Assessment & Plan   1  Acute hypercarbic respiratory failure now resolved  Patient is recommended to use BiPAP regularly  2  History of obstructive sleep apnea on BiPAP    3  Acute bronchitis    4  Muscular dystrophy limb girdle type    5  RBBB which is chronic    6  History of tachycardia and at least moderate to severe pulmonary hypertension will repeat echo Doppler    7  Essential hypertension  Pretty well controlled with current therapy  Partially elevation may be related to patient being constipated and some anxious currently blood pressure is acceptable      8  History of chronic diastolic heart failure with chronic lower extremity edema most likely dependent  Continue low-dose diuretics    Plan  Continue current Rx  ER medication for constipation  Continue Cardizem and metoprolol  Echo Doppler  Continue other Rx as before  Discussed with patient's family and medical team   Thank you for your consultation  Physician Requesting Consult: Germain Escudero DO    Reason for Consult / Principal Problem:  Hypertension and history of CO2 narcosis and pulmonary hypertension    Inpatient consult to Cardiology  Consult performed by: Yobani Dominguez MD  Consult ordered by: Robinson Burt DO          HPI: Tosha Galdamez is a 80y o  year old female who presents with altered mental status and was found to have CO2 narcosis    Patient is known to have previous history of hypercarbic respiratory failure, she is mostly bed-bound due to some neurological disorder, history of moderate pulmonary hypertension with previous echo showing PA pressure around 60 mm of mercury and 2+ TR, history of leg edema which has significantly improved since on low-dose diuretics, hypertension who was having cough with expectoration and did not use his BiPAP and then admitted with altered mental status and found to have hypercarbic respiratory failure  Cardiology consult is called because blood pressure was noted to be high  Patient is now much more alert she is feeling much better blood pressure also improved she is lying flat and she has communicates  She denies any chest pain any nausea and vomiting no other significant issues at this time  Review of Systems   Constitutional: Positive for fatigue  HENT: Positive for hearing loss  Respiratory: Positive for cough  History of hypercarbic respiratory failure   Cardiovascular:        History of tachycardia   Gastrointestinal: Positive for constipation  Musculoskeletal: Positive for back pain and gait problem  Psychiatric/Behavioral: The patient is nervous/anxious  All other systems reviewed and are negative  Historical Information   Past Medical History:   Diagnosis Date    Arthritis     [facility]congest[facility] failure[facility] ([facility] )     History of methicillin resistant staphylococcus aureus (MRSA)     7/29/2016 MRSA (nares) positive  Negative 2/21/2018  Isolation discontinued 2/25/2019    Hypertension     Morbid obesity ([facility] )     Muscular dystrophy      Past Surgical History:   Procedure Laterality Date    APPENDECTOMY      CHOLECYSTECTOMY      HYSTERECTOMY      IR PICC LINE  2/22/2019    THYROID CYST EXCISION      VEIN SURGERY Bilateral      Social History     Substance and Sexual Activity   Alcohol Use Not Currently    Alcohol/week: 0 0 oz    Frequency: Never    Drinks per session: Patient refused    Binge frequency: Never     Social History     Substance and Sexual Activity   Drug Use No     Social History     Tobacco Use   Smoking Status Never Smoker   Smokeless Tobacco Never Used     Family History: No family history on file      Meds/Allergies    PTA meds:    Medications Prior to Admission   Medication    acetaminophen (TYLENOL) 325 mg tablet    albuterol (ACCUNEB) 0 63 MG/3ML nebulizer solution    azithromycin (ZITHROMAX) 500 MG tablet    celecoxib (CeleBREX) 200 mg capsule    diltiazem (CARDIZEM CD) 240 mg 24 hr capsule    furosemide (LASIX) 20 mg tablet    ipratropium-albuterol (DUO-NEB) 0 5-2 5 mg/3 mL    metoprolol succinate (TOPROL-XL) 25 mg 24 hr tablet    nystatin (MYCOSTATIN) powder    silver sulfadiazine (SILVADENE,SSD) 1 % cream      No Known Allergies    Current Facility-Administered Medications:     acetaminophen (TYLENOL) tablet 650 mg, 650 mg, Oral, Q6H PRN, Ame Montilla PA-C, 650 mg at 02/22/19 1803    acetylcysteine (MUCOMYST) 200 mg/mL inhalation solution 600 mg, 3 mL, Nebulization, TID, Rosa M Stapleton MD    benzonatate (TESSALON PERLES) capsule 100 mg, 100 mg, Oral, TID PRN, Blayne Smalls MD    celecoxib (CeleBREX) capsule 200 mg, 200 mg, Oral, Daily, Blayne Smalls MD, 200 mg at 02/25/19 0839    diltiazem (CARDIZEM CD) 24 hr capsule 240 mg, 240 mg, Oral, Daily, Jennifer Knox PA-C, 240 mg at 02/25/19 0628    docusate sodium (COLACE) capsule 100 mg, 100 mg, Oral, BID, Jennifer Alvarenga DO, 100 mg at 02/25/19 0839    enoxaparin (LOVENOX) subcutaneous injection 40 mg, 40 mg, Subcutaneous, Daily, Jennifer Knox PA-C, 40 mg at 02/25/19 0840    fluticasone-vilanterol (BREO ELLIPTA) 100-25 mcg/inh inhaler 1 puff, 1 puff, Inhalation, Daily, Rosa M Stapleton MD, 1 puff at 02/25/19 0840    furosemide (LASIX) tablet 10 mg, 10 mg, Oral, Every Other Day, Jennifer Knox PA-C, 10 mg at 02/24/19 0936    guaiFENesin (MUCINEX) 12 hr tablet 600 mg, 600 mg, Oral, Q12H [address], Rosa M Stapleton MD, 600 mg at 02/25/19 1459    ipratropium (ATROVENT) 0 02 % inhalation solution 0 5 mg, 0 5 mg, Nebulization, TID, Rosa M Stapleton MD    levalbuterol (XOPENEX) inhalation solution 0 63 mg, 0 63 mg, Nebulization, Q4H PRN, Blayne Smalls MD, 0 63 mg at 02/24/19 1150    levalbuterol (XOPENEX) inhalation solution 0 63 mg, 0 63 mg, Nebulization, TID, Tana Hernandez MD    methylPREDNISolone sodium succinate (Solu-MEDROL) injection 20 mg, 20 mg, Intravenous, Q12H [address], Tana Hernandez MD    metoprolol succinate (TOPROL-XL) 24 hr tablet 25 mg, 25 mg, Oral, BID, Jennifer Knox PA-C, 25 mg at 02/25/19 0840    nystatin (MYCOSTATIN) powder, , Topical, 4x Daily, Jennifer Knox PA-C    silver sulfadiazine (SILVADENE,SSD) 1 % cream, , Topical, Daily, Edison Robles PA-C    VTE Pharmacologic Prophylaxis:   Lovenox    Objective:   Vitals: Blood pressure 138/64, pulse 86, temperature 97 9 °F (36 6 °C), temperature source Tympanic, resp  rate 18, height 5' 7" (1 702 m), weight 105 kg (231 lb 7 7 oz), SpO2 90 %, not currently breastfeeding  Body mass index is 36 26 kg/m²    BP Readings from Last 3 Encounters:   02/25/19 138/64   04/26/18 146/65   02/07/18 120/80     Orthostatic Blood Pressures      Most Recent Value   Blood Pressure  138/64 filed at 02/25/2019 1333   Patient Position - Orthostatic VS  Lying filed at 02/25/2019 1333          Intake/Output Summary (Last 24 hours) at 2/25/2019 1513  Last data filed at 2/25/2019 1252  Gross per 24 hour   Intake    Output 1300 ml   Net -1300 ml       Invasive Devices     Peripherally Inserted Central Catheter Line            PICC Line 02/22/19 2 days          Drain            External Urinary Catheter 1 day                  Physical Exam:   Physical Exam    Neurologic:  Alert & oriented x 3, no new focal deficits, Not in any acute distress, mostly bed-bound  Constitutional:  Well developed, well nourished, non-toxic appearance   Eyes:  Pupil equal and reacting to light, conjunctiva normal   HENT:  Atraumatic, oropharynx moist, Neck- normal range of motion, no tenderness, supple   Respiratory:  Bilateral air entry, with bilateral coarse breath sounds and few crackles  Cardiovascular: S1-S2 regular with a 2/6 ejection systolic murmur and S4 is present  GI:  Soft, nondistended, normal bowel sounds, nontender, no hepatosplenomegaly appreciated  Musculoskeletal:  No edema, no tenderness, no deformities     Skin:  Well hydrated, no rash   Lymphatic:  No lymphadenopathy noted   Extremities:  Mild edema, mostly dependent    Labs:   Troponins:   Results from last 7 days   Lab Units 02/22/19 0037   TROPONIN I ng/mL <0 02       CBC with diff:   Results from last 7 days   Lab Units 02/25/19  0631 02/24/19  0528 02/22/19  0539 02/21/19 2327 02/21/19  2323   WBC Thousand/uL 7 23 4 54 9 14 9 63  --    HEMOGLOBIN g/dL 11 7 11 3* 12 9 13 3  --    I STAT HEMOGLOBIN g/dl  --   --   --   --  12 2   HEMATOCRIT % 39 4 36 9 43 2 42 7  --    HEMATOCRIT, ISTAT %  --   --   --   --  36   MCV fL 89 87 89 85  --    PLATELETS Thousands/uL 248 241 210 213  --    MCH pg 26 4* 26 7* 26 7* 26 6*  --    MCHC g/dL 29 7* 30 6* 29 9* 31 1*  --    RDW % 15 0 14 9 14 6 18 4*  --    MPV fL 8 9 8 8* 9 1 9 3  --    NRBC AUTO /100 WBCs 0 0 0 0  --        CMP:   Results from last 7 days   Lab Units 02/25/19  0631 02/24/19 0528 02/22/19  0539 02/22/19 0037 02/21/19  2323   SODIUM mmol/L 137 137 132* 129*  --    POTASSIUM mmol/L 4 7 4 4 4 5 4 6  --    CHLORIDE mmol/L 99* 97* 93* 92*  --    CO2 mmol/L 39* 37* 36* 38*  --    CO2, I-STAT mmol/L  --   --   --   --  39*   ANION GAP mmol/L -1* 3* 3* -1*  --    BUN mg/dL 28* 28* 10 11  --    CREATININE mg/dL 0 50* 0 52* 0 27* 0 25*  --    CALCIUM mg/dL 8 7 8 8 8 8 8 8  --    AST U/L  --   --  13 16  --    ALT U/L  --   --  23 22  --    ALK PHOS U/L  --   --  104 108  --    TOTAL PROTEIN g/dL  --   --  6 8 6 7  --    ALBUMIN g/dL  --   --  2 8* 2 7*  --    TOTAL BILIRUBIN mg/dL  --   --  0 20 0 30  --    EGFR ml/min/1 73sq m 87 86 107 110  --    GLUCOSE RANDOM mg/dL 144* 144* 113 108  --        Magnesium:   Results from last 7 days   Lab Units 02/25/19  0631 02/24/19  0528 02/22/19  0539   MAGNESIUM mg/dL 2 0 1 9 1 8     Coags:   Results from last 7 days   Lab Units 02/22/19  0037   PTT seconds 28   INR  1 01 Imaging & Testing   Cardiac testing:   Results for orders placed during the hospital encounter of 16   Echo complete with contrast if indicated    Narrative [address]  [address] [facility]  Lloyd Odonnell [phone]    Transthoracic Echocardiogram  Limited 2D, M-mode, Doppler, and Color Doppler    Study date:  2016    Patient: Thalia Angulo  MR number: [id number]  Account number: [de-identified]  : 10-Aug-1931  Age: 80 years  Gender: Female  Status: Routine  Location: Echo lab  Height: 68 in  Weight: 339 2 lb  BP: 113/ 59 mmHg    Indications: Pulmonary HTN    Diagnoses: 785 2 - CARDIAC MURMURS NEC    Sonographer:  Rolo Reyes  Primary Physician:  Linda Eli DO  Referring Physician:  Linda Eli DO  Group:  [facility]  RN:  Rajat Scales RN  Interpreting Physician:  Wilfredo Sepulveda MD    SUMMARY    PROCEDURE INFORMATION:  This was a technically difficult study  LEFT VENTRICLE:  Systolic function was normal  Ejection fraction was estimated in the range of  60 % to 65 % to be 60 %  Although no diagnostic regional wall motion  abnormality was identified, this possibility cannot be completely excluded on  the basis of this study  Wall thickness was mildly increased  There was mild concentric hypertrophy  Features were consistent with a pseudonormal left ventricular filling pattern,  with concomitant abnormal relaxation and increased filling pressure (grade 2  diastolic dysfunction)  LEFT ATRIUM:  The atrium was mildly dilated  MITRAL VALVE:  There was mild annular calcification  There was mild regurgitation  TRICUSPID VALVE:  There was moderate regurgitation  Estimated peak PA pressure was 60 mmHg  HISTORY: PRIOR HISTORY: Muscular Dystrophy, Arthritis    PROCEDURE: The procedure was performed in the echo lab  This was a routine  study  The transthoracic approach was used   The study included limited 2D  imaging, M-mode, limited spectral Doppler, and color Doppler  The heart rate  was 67 bpm, at the start of the study  Images were not obtained from the  parasternal, subcostal, or suprasternal notch acoustic windows  Intravenous  contrast (Definity solution [1 3 ml Definity/8 7ml normal saline solution]) was  administered  Intravenous contrast ( 4 ml) was administered to opacify the left  ventricle  Echocardiographic views were limited due to restricted patient  mobility, poor acoustic window availability, and decreased penetration  This  was a technically difficult study  LEFT VENTRICLE: Size was normal  Systolic function was normal  Ejection  fraction was estimated in the range of 60 % to 65 % to be 60 %  Although no  diagnostic regional wall motion abnormality was identified, this possibility  cannot be completely excluded on the basis of this study  Wall thickness was  mildly increased  There was mild concentric hypertrophy  DOPPLER: Features were  consistent with a pseudonormal left ventricular filling pattern, with  concomitant abnormal relaxation and increased filling pressure (grade 2  diastolic dysfunction)  RIGHT VENTRICLE: The size was normal     LEFT ATRIUM: The atrium was mildly dilated  RIGHT ATRIUM: Size was at the upper limits of normal     MITRAL VALVE: There was mild annular calcification  DOPPLER: There was mild  regurgitation  AORTIC VALVE: DOPPLER: There was no evidence for stenosis  There was no  regurgitation  TRICUSPID VALVE: DOPPLER: There was moderate regurgitation  Estimated peak PA  pressure was 60 mmHg  PULMONIC VALVE: Not well visualized  PERICARDIUM: There was no pericardial effusion  AORTA: The root exhibited normal size  SYSTEM MEASUREMENT TABLES    Unspecified Scan Mode  MV Peak A Mayo: 654 mm/s  MV Peak E Mayo   Mean: 901 mm/s  MVA (PHT): 4 15 cm squared  PHT: 53 ms  Max P mm[Hg]  V Max: 3430 mm/s  Vmax: 3150 mm/s    [employer] Accredited Echocardiography Laboratory    Prepared and electronically signed by    Shaw Shah MD  Signed 01-Aug-2016 10:33:45           Imaging: I have personally reviewed pertinent reports  Xr Chest 1 View Portable    Result Date: 2/22/2019  Narrative: CHEST INDICATION:   chest congestion  COMPARISON:  4/26/2018 EXAM PERFORMED/VIEWS:  XR CHEST PORTABLE FINDINGS: Cardiomegaly noted as before  The lungs are clear  No pneumothorax or pleural effusion  Osseous structures appear within normal limits for patient age  Impression: Stable cardiomegaly without acute pulmonary disease  Workstation performed: [id number]         EKG/ Monitor: Personally reviewed  Admission EKG shows normal sinus rhythm RBBB which is chronic not change from old EKG  Code Status: Level 3 - DNAR and DNI  Advance Directive and Living Will:      POLST:        Shaw Shah MD MD, [facility]      "This note has been constructed using a voice recognition system  Therefore there may be syntax, spelling, and/or grammatical errors   Please call if you have any questions  "

## 2019-02-25 NOTE — PROGRESS NOTES
Progress Note - Pulmonary   Elly Villarrealp 80 y o  female MRN: [id number]  Unit/Bed#: 2 [place] [address] Encounter: [id number]      Assessment/Plan:       Acute bronchitis  Assessment & Plan  -to complete a total 5 days of therapy of azithromycin    PORTIA treated with BiPAP  Assessment & Plan  -patient utilizes BiPAP at home 10/5  -family typically bleeds 2 L cannula oxygen at night    Muscular dystrophy, limb girdle  Assessment & Plan  -patient has a history of muscular dystrophy  -would likely benefit from cougholator verses chest percussive therapy    * Acute on chronic respiratory failure with hypoxia and hypercapnia (HCC)  Assessment & Plan  -patient utilizing BiPAP 10/5/28%  -her hypercapnia appears to be stable          ______________________________________________________________________    Subjective: Pt seen and examined at bedside  No complaints  Patient is feeling better than she had been  Tele Events:     Vitals:   Temp:  [97 8 °F (36 6 °C)-98 2 °F (36 8 °C)] 98 °F (36 7 °C)  HR:  [] 105  Resp:  [18-20] 18  BP: (123-180)/(56-77) 133/59  Weight (last 2 days)     Date/Time   Weight    02/25/19 0600   105 (231 48)    02/23/19 0532   105 (231 92)            Oxygen Therapy  SpO2: 91 %  O2 Flow Rate (L/min): 2 L/min    IV Infusions:       Nutrition:        Diet Orders   (From admission, onward)            Start     Ordered    02/22/19 1308  Diet Regular; Regular House  Diet effective now     Question Answer Comment   Diet Type Regular    Regular Regular House    RD to adjust diet per protocol? Yes        02/22/19 1307    02/22/19 1126  Room Service  Once     Question:  Type of Service  Answer:  Room Service - Appropriate with Assistance    02/22/19 1126          Ins/Outs:   I/O       02/23 0701 - 02/24 0700 02/24 0701 - 02/25 0700 02/25 0701 - 02/26 0700    P  O  970      I V  (mL/kg) 20 (0 2)      IV Piggyback       Total Intake(mL/kg) 990 (9 4)      Urine (mL/kg/hr) 1100 (0 4) 1250 (0 5)     Total Output 1100 1250     Net -110 -1250                  Lines/Drains:  Invasive Devices     Peripherally Inserted Central Catheter Line            PICC Line 02/22/19 2 days          Drain            External Urinary Catheter 1 day                 Active medications:  Scheduled Meds:    Current Facility-Administered Medications:  acetaminophen 650 mg Oral Q6H PRN Mainsha Dubose PA-C   acetylcysteine 3 mL Nebulization 4x Daily Yessenia Abreu DO   [START ON 2/26/2019] azithromycin 500 mg Intravenous Q24H Danay De PA-C   benzonatate 100 mg Oral TID PRN Natalie Hui MD   celecoxib 200 mg Oral Daily Natalie Hui MD   diltiazem 240 mg Oral Daily Jennifer Knox PA-C   docusate sodium 100 mg Oral BID Jennifer Alvarenga DO   enoxaparin 40 mg Subcutaneous Daily Jennifer Knox PA-C   fluticasone-vilanterol 1 puff Inhalation Daily Meera Pizano MD   furosemide 10 mg Oral Every Other Day Jennifer Knox PA-C   ipratropium 0 5 mg Nebulization 4x Daily Meera Pizano MD   levalbuterol 0 63 mg Nebulization Q4H PRN Natalie Hui MD   levalbuterol 0 63 mg Nebulization 4x Daily Natalie Hui MD   methylPREDNISolone sodium succinate 40 mg Intravenous Q12H [address] Jennifer Alvarenga DO   metoprolol succinate 25 mg Oral BID Jennifer Knox PA-C   nystatin  Topical 4x Daily Jennifer Knox PA-C   silver sulfadiazine  Topical Daily Jennifer Knox PA-C     PRN Meds:    acetaminophen 650 mg Q6H PRN   benzonatate 100 mg TID PRN   levalbuterol 0 63 mg Q4H PRN     ____________________________________________________________________      Physical Exam   Constitutional: She is oriented to person, place, and time  She appears well-developed  Obese body habitus   HENT:   Head: Normocephalic and atraumatic  Patient is obviously hard of hearing   Eyes: Pupils are equal, round, and reactive to light  Conjunctivae are normal    Neck: Normal range of motion  Neck supple     Cardiovascular: Normal rate, regular rhythm and normal heart sounds  Pulmonary/Chest: Effort normal  No respiratory distress  She has decreased breath sounds in the right middle field and the right lower field  She has no wheezes  She has no rhonchi  She has no rales  She exhibits no tenderness  Patient is on 2 L nasal cannula   Abdominal: Soft  Bowel sounds are normal    Musculoskeletal: Normal range of motion  She exhibits no edema  Neurological: She is alert and oriented to person, place, and time  Skin: Skin is warm and dry  Psychiatric: She has a normal mood and affect            ____________________________________________________________________    Labs:   CBC:   Results from last 7 days   Lab Units 02/25/19  0631 02/24/19  0528 02/22/19  0539   WBC Thousand/uL 7 23 4 54 9 14   HEMOGLOBIN g/dL 11 7 11 3* 12 9   HEMATOCRIT % 39 4 36 9 43 2   MCV fL 89 87 89   PLATELETS Thousands/uL 248 241 210     CMP:   Results from last 7 days   Lab Units 02/25/19  0631 02/24/19  0528 02/22/19  0539 02/22/19  0037  02/21/19  2323   POTASSIUM mmol/L 4 7 4 4 4 5 4 6   < >  --    CHLORIDE mmol/L 99* 97* 93* 92*   < >  --    CO2 mmol/L 39* 37* 36* 38*   < >  --    CO2, I-STAT mmol/L  --   --   --   --   --  39*   BUN mg/dL 28* 28* 10 11   < >  --    CREATININE mg/dL 0 50* 0 52* 0 27* 0 25*   < >  --    GLUCOSE, ISTAT mg/dl  --   --   --   --   --  121   CALCIUM mg/dL 8 7 8 8 8 8 8 8   < >  --    AST U/L  --   --  13 16  --   --    ALT U/L  --   --  23 22  --   --    ALK PHOS U/L  --   --  104 108   < >  --    EGFR ml/min/1 73sq m 87 86 107 110   < >  --     < > = values in this interval not displayed       No components found for: ABG    Magnesium:   Results from last 7 days   Lab Units 02/25/19  0631   MAGNESIUM mg/dL 2 0     Phosphorous:   Results from last 7 days   Lab Units 02/25/19  0631   PHOSPHORUS mg/dL 2 7     Troponin:   Results from last 7 days   Lab Units 02/22/19  0037   TROPONIN I ng/mL <0 02     PT/INR:   Results from last 7 days   Lab Units 02/22/19 0037   PTT seconds 28   INR  1 01     Lactic Acid:     BNP:   Results from last 7 days   Lab Units 02/22/19 0037   NT-PRO BNP pg/mL 873*     TSH:     Procalcitonin: Invalid input(s): PROCALCITONIN      Imaging:   IR PICC line   Final Result by Jenna Apodaca DO (02/22 1732)   Successful placement of right arm midline catheter  The catheter is ready for use       _______________________________________________________________   PROCEDURE DETAILS:    Operators: Dr Jena Bartlett, [address] [place] [address] attending, performed the procedure  Anesthesia: 1% lidocaine was injected in the skin and subcutaneous tissues overlying the access site  Medications: 1% lidocaine   Contrast: 0 mL of Omnipaque 300   Fluoroscopy time: 0 minutes   Images: 2      COMMENTS:   Following the discussion of the risks, benefits and alternatives to the procedure, written informed consent was obtained from the patient and her daughter  The procedure was done at the bedside in the ICU  The right arm veins were evaluated as a    potential access site with ultrasound  The site was prepped and draped in the usual sterile fashion  All elements of maximal sterile barrier technique, cap and mask and sterile gown and sterile gloves and sterile full-body drape and hand hygiene and 2%    chlorhexidine for cutaneous antisepsis  Sterile ultrasound technique with sterile gel and sterile probe covers was also utilized  A preprocedure timeout was performed per [facility] protocol  Lidocaine was administered to the skin and a small skin incision was made  Under ultrasound guidance, the right basilic vein was punctured using a micropuncture set  Static images of real time needle entry into the vessel were obtained  A peel-away    sheath was then placed over a guidewire  A double lumen power catheter measuring   5 cm in length was then placed through the peel-away sheath  The peel-away sheath and guidewire were then removed    The catheter was aspirated and then flushed with    saline  The catheter was secured to the skin and a sterile dressing was applied  Workstation performed: [id number]         XR chest 1 view portable   Final Result by Mamadou Peters MD (02/22 0848)   Stable cardiomegaly without acute pulmonary disease  Workstation performed: [id number]               Micro:   Lab Results   Component Value Date    BLOODCX No Growth After 5 Days  04/26/2018    BLOODCX No Growth After 5 Days  04/26/2018    BLOODCX No Growth After 5 Days  01/27/2016    BLOODCX No Growth After 5 Days  01/27/2016    URINECX >100,000 cfu/ml Escherichia coli (A) 04/26/2018    URINECX Culture results to follow   07/28/2016    URINECX >100,000 cfu/ml Escherichia coli 07/28/2016    URINECX 40,000-49,000 cfu/ml Klebsiella pneumoniae 07/28/2016    SPUTUMCULTUR 3+ Growth of Mixed Respiratory Antonella 01/31/2016    MRSACULTURE  02/22/2019     No Methicillin Resistant Staphlyococcus aureus (MRSA) isolated      Results from last 7 days   Lab Units 02/22/19  0229   INFLUENZA B PCR  Not Detected   RSV PCR  Not Detected           Code Status: Level 3 - DNAR and DNI

## 2019-02-26 ENCOUNTER — TELEPHONE (OUTPATIENT)
Dept: FAMILY MEDICINE CLINIC | Facility: CLINIC | Age: 84
End: 2019-02-26

## 2019-02-26 ENCOUNTER — APPOINTMENT (INPATIENT)
Dept: NON INVASIVE DIAGNOSTICS | Facility: HOSPITAL | Age: 84
DRG: 202 | End: 2019-02-26
Payer: MEDICARE

## 2019-02-26 ENCOUNTER — APPOINTMENT (INPATIENT)
Dept: RADIOLOGY | Facility: HOSPITAL | Age: 84
DRG: 202 | End: 2019-02-26
Payer: MEDICARE

## 2019-02-26 PROBLEM — I50.33 ACUTE ON CHRONIC DIASTOLIC HEART FAILURE (HCC): Status: ACTIVE | Noted: 2019-02-22

## 2019-02-26 PROBLEM — R74.8 ELEVATED LIVER ENZYMES: Status: ACTIVE | Noted: 2019-02-26

## 2019-02-26 LAB
ALBUMIN SERPL BCP-MCNC: 2.8 G/DL (ref 3.5–5)
ALP SERPL-CCNC: 87 U/L (ref 46–116)
ALT SERPL W P-5'-P-CCNC: 184 U/L (ref 12–78)
ANION GAP SERPL CALCULATED.3IONS-SCNC: -1 MMOL/L (ref 4–13)
AST SERPL W P-5'-P-CCNC: 68 U/L (ref 5–45)
BASOPHILS # BLD AUTO: 0.02 THOUSANDS/ΜL (ref 0–0.1)
BASOPHILS NFR BLD AUTO: 0 % (ref 0–1)
BILIRUB SERPL-MCNC: 0.3 MG/DL (ref 0.2–1)
BUN SERPL-MCNC: 27 MG/DL (ref 5–25)
CALCIUM SERPL-MCNC: 8.9 MG/DL (ref 8.3–10.1)
CHLORIDE SERPL-SCNC: 95 MMOL/L (ref 100–108)
CO2 SERPL-SCNC: 43 MMOL/L (ref 21–32)
CREAT SERPL-MCNC: 0.46 MG/DL (ref 0.6–1.3)
EOSINOPHIL # BLD AUTO: 0 THOUSAND/ΜL (ref 0–0.61)
EOSINOPHIL NFR BLD AUTO: 0 % (ref 0–6)
ERYTHROCYTE [DISTWIDTH] IN BLOOD BY AUTOMATED COUNT: 14.6 % (ref 11.6–15.1)
GFR SERPL CREATININE-BSD FRML MDRD: 90 ML/MIN/1.73SQ M
GLUCOSE SERPL-MCNC: 146 MG/DL (ref 65–140)
HCT VFR BLD AUTO: 43.2 % (ref 34.8–46.1)
HGB BLD-MCNC: 12.9 G/DL (ref 11.5–15.4)
IMM GRANULOCYTES # BLD AUTO: 0.19 THOUSAND/UL (ref 0–0.2)
IMM GRANULOCYTES NFR BLD AUTO: 2 % (ref 0–2)
LYMPHOCYTES # BLD AUTO: 0.54 THOUSANDS/ΜL (ref 0.6–4.47)
LYMPHOCYTES NFR BLD AUTO: 4 % (ref 14–44)
MAGNESIUM SERPL-MCNC: 2.1 MG/DL (ref 1.6–2.6)
MCH RBC QN AUTO: 26.7 PG (ref 26.8–34.3)
MCHC RBC AUTO-ENTMCNC: 29.9 G/DL (ref 31.4–37.4)
MCV RBC AUTO: 89 FL (ref 82–98)
MONOCYTES # BLD AUTO: 0.47 THOUSAND/ΜL (ref 0.17–1.22)
MONOCYTES NFR BLD AUTO: 4 % (ref 4–12)
NEUTROPHILS # BLD AUTO: 11.88 THOUSANDS/ΜL (ref 1.85–7.62)
NEUTS SEG NFR BLD AUTO: 90 % (ref 43–75)
NRBC BLD AUTO-RTO: 0 /100 WBCS
NT-PROBNP SERPL-MCNC: 1996 PG/ML
PHOSPHATE SERPL-MCNC: 3.2 MG/DL (ref 2.3–4.1)
PLATELET # BLD AUTO: 272 THOUSANDS/UL (ref 149–390)
PMV BLD AUTO: 9 FL (ref 8.9–12.7)
POTASSIUM SERPL-SCNC: 4.8 MMOL/L (ref 3.5–5.3)
PROCALCITONIN SERPL-MCNC: <0.05 NG/ML
PROCALCITONIN SERPL-MCNC: <0.05 NG/ML
PROT SERPL-MCNC: 6.4 G/DL (ref 6.4–8.2)
RBC # BLD AUTO: 4.84 MILLION/UL (ref 3.81–5.12)
SODIUM SERPL-SCNC: 137 MMOL/L (ref 136–145)
WBC # BLD AUTO: 13.1 THOUSAND/UL (ref 4.31–10.16)

## 2019-02-26 PROCEDURE — 93308 TTE F-UP OR LMTD: CPT | Performed by: INTERNAL MEDICINE

## 2019-02-26 PROCEDURE — 84145 PROCALCITONIN (PCT): CPT | Performed by: PHYSICIAN ASSISTANT

## 2019-02-26 PROCEDURE — 94668 MNPJ CHEST WALL SBSQ: CPT

## 2019-02-26 PROCEDURE — 84145 PROCALCITONIN (PCT): CPT | Performed by: STUDENT IN AN ORGANIZED HEALTH CARE EDUCATION/TRAINING PROGRAM

## 2019-02-26 PROCEDURE — 83735 ASSAY OF MAGNESIUM: CPT | Performed by: STUDENT IN AN ORGANIZED HEALTH CARE EDUCATION/TRAINING PROGRAM

## 2019-02-26 PROCEDURE — 93325 DOPPLER ECHO COLOR FLOW MAPG: CPT | Performed by: INTERNAL MEDICINE

## 2019-02-26 PROCEDURE — 94760 N-INVAS EAR/PLS OXIMETRY 1: CPT

## 2019-02-26 PROCEDURE — 99233 SBSQ HOSP IP/OBS HIGH 50: CPT | Performed by: INTERNAL MEDICINE

## 2019-02-26 PROCEDURE — 93321 DOPPLER ECHO F-UP/LMTD STD: CPT | Performed by: INTERNAL MEDICINE

## 2019-02-26 PROCEDURE — 93306 TTE W/DOPPLER COMPLETE: CPT

## 2019-02-26 PROCEDURE — 99233 SBSQ HOSP IP/OBS HIGH 50: CPT | Performed by: FAMILY MEDICINE

## 2019-02-26 PROCEDURE — 99232 SBSQ HOSP IP/OBS MODERATE 35: CPT | Performed by: INTERNAL MEDICINE

## 2019-02-26 PROCEDURE — 80053 COMPREHEN METABOLIC PANEL: CPT | Performed by: STUDENT IN AN ORGANIZED HEALTH CARE EDUCATION/TRAINING PROGRAM

## 2019-02-26 PROCEDURE — 71250 CT THORAX DX C-: CPT

## 2019-02-26 PROCEDURE — 84100 ASSAY OF PHOSPHORUS: CPT | Performed by: STUDENT IN AN ORGANIZED HEALTH CARE EDUCATION/TRAINING PROGRAM

## 2019-02-26 PROCEDURE — 94640 AIRWAY INHALATION TREATMENT: CPT

## 2019-02-26 PROCEDURE — 85025 COMPLETE CBC W/AUTO DIFF WBC: CPT | Performed by: STUDENT IN AN ORGANIZED HEALTH CARE EDUCATION/TRAINING PROGRAM

## 2019-02-26 PROCEDURE — 83880 ASSAY OF NATRIURETIC PEPTIDE: CPT | Performed by: STUDENT IN AN ORGANIZED HEALTH CARE EDUCATION/TRAINING PROGRAM

## 2019-02-26 PROCEDURE — 94660 CPAP INITIATION&MGMT: CPT

## 2019-02-26 RX ORDER — ALBUMIN, HUMAN INJ 5% 5 %
12.5 SOLUTION INTRAVENOUS ONCE
Status: COMPLETED | OUTPATIENT
Start: 2019-02-26 | End: 2019-02-26

## 2019-02-26 RX ORDER — FUROSEMIDE 40 MG/1
40 TABLET ORAL DAILY
Status: DISCONTINUED | OUTPATIENT
Start: 2019-02-27 | End: 2019-02-27

## 2019-02-26 RX ADMIN — GUAIFENESIN 600 MG: 600 TABLET, EXTENDED RELEASE ORAL at 21:37

## 2019-02-26 RX ADMIN — DILTIAZEM HYDROCHLORIDE 240 MG: 240 CAPSULE, COATED, EXTENDED RELEASE ORAL at 09:39

## 2019-02-26 RX ADMIN — NYSTATIN: 100000 POWDER TOPICAL at 09:39

## 2019-02-26 RX ADMIN — ACETYLCYSTEINE 600 MG: 200 SOLUTION ORAL; RESPIRATORY (INHALATION) at 07:00

## 2019-02-26 RX ADMIN — METHYLPREDNISOLONE SODIUM SUCCINATE 20 MG: 40 INJECTION, POWDER, FOR SOLUTION INTRAMUSCULAR; INTRAVENOUS at 21:37

## 2019-02-26 RX ADMIN — IPRATROPIUM BROMIDE 0.5 MG: 0.5 SOLUTION RESPIRATORY (INHALATION) at 19:52

## 2019-02-26 RX ADMIN — NYSTATIN: 100000 POWDER TOPICAL at 17:43

## 2019-02-26 RX ADMIN — ALBUMIN (HUMAN) 12.5 G: 12.5 SOLUTION INTRAVENOUS at 14:35

## 2019-02-26 RX ADMIN — CELECOXIB 200 MG: 100 CAPSULE ORAL at 09:38

## 2019-02-26 RX ADMIN — DOCUSATE SODIUM 100 MG: 100 CAPSULE, LIQUID FILLED ORAL at 17:43

## 2019-02-26 RX ADMIN — GUAIFENESIN 600 MG: 600 TABLET, EXTENDED RELEASE ORAL at 09:39

## 2019-02-26 RX ADMIN — FUROSEMIDE 10 MG: 20 TABLET ORAL at 09:38

## 2019-02-26 RX ADMIN — NYSTATIN: 100000 POWDER TOPICAL at 12:26

## 2019-02-26 RX ADMIN — DOCUSATE SODIUM 100 MG: 100 CAPSULE, LIQUID FILLED ORAL at 09:38

## 2019-02-26 RX ADMIN — METOPROLOL SUCCINATE 25 MG: 25 TABLET, EXTENDED RELEASE ORAL at 17:43

## 2019-02-26 RX ADMIN — LEVALBUTEROL HYDROCHLORIDE 0.63 MG: 0.63 SOLUTION RESPIRATORY (INHALATION) at 19:52

## 2019-02-26 RX ADMIN — METHYLPREDNISOLONE SODIUM SUCCINATE 20 MG: 40 INJECTION, POWDER, FOR SOLUTION INTRAMUSCULAR; INTRAVENOUS at 09:40

## 2019-02-26 RX ADMIN — SILVER SULFADIAZINE: 10 CREAM TOPICAL at 09:40

## 2019-02-26 RX ADMIN — IPRATROPIUM BROMIDE 0.5 MG: 0.5 SOLUTION RESPIRATORY (INHALATION) at 13:43

## 2019-02-26 RX ADMIN — ACETYLCYSTEINE 600 MG: 200 SOLUTION ORAL; RESPIRATORY (INHALATION) at 13:43

## 2019-02-26 RX ADMIN — ENOXAPARIN SODIUM 40 MG: 40 INJECTION SUBCUTANEOUS at 09:39

## 2019-02-26 RX ADMIN — NYSTATIN: 100000 POWDER TOPICAL at 21:37

## 2019-02-26 RX ADMIN — METOPROLOL SUCCINATE 25 MG: 25 TABLET, EXTENDED RELEASE ORAL at 09:39

## 2019-02-26 RX ADMIN — ACETYLCYSTEINE 600 MG: 200 SOLUTION ORAL; RESPIRATORY (INHALATION) at 19:51

## 2019-02-26 RX ADMIN — LEVALBUTEROL HYDROCHLORIDE 0.63 MG: 0.63 SOLUTION RESPIRATORY (INHALATION) at 13:43

## 2019-02-26 RX ADMIN — FLUTICASONE FUROATE AND VILANTEROL TRIFENATATE 1 PUFF: 100; 25 POWDER RESPIRATORY (INHALATION) at 09:40

## 2019-02-26 RX ADMIN — IPRATROPIUM BROMIDE 0.5 MG: 0.5 SOLUTION RESPIRATORY (INHALATION) at 07:00

## 2019-02-26 RX ADMIN — LEVALBUTEROL HYDROCHLORIDE 0.63 MG: 0.63 SOLUTION RESPIRATORY (INHALATION) at 07:00

## 2019-02-26 NOTE — ASSESSMENT & PLAN NOTE
-continues to diurese and is -1 5 L/24 hours  -on 2 L nasal cannula patient oxygen saturation is 97%  -patient can utilize up to 2 L nasal cannula oxygen at home to maintain oxygen saturation 92-94%  -CT of the chest consistent with small bibasilar pleural effusions and atelectasis  -clinically is not consistent with pneumonia  -continue diuresis per pleural effusions  -Cough assist was ordered for atelectasis > pending delivery from [address] [place] [address]

## 2019-02-26 NOTE — PLAN OF CARE
Problem: PAIN - ADULT  Goal: Verbalizes/displays adequate comfort level or baseline comfort level  Description  Interventions:  - Encourage patient to monitor pain and request assistance  - Assess pain using appropriate pain scale  - Administer analgesics based on type and severity of pain and evaluate response  - Implement non-pharmacological measures as appropriate and evaluate response  - Notify physician/advanced practitioner if interventions unsuccessful or patient reports new pain   Outcome: Progressing     Problem: INFECTION - ADULT  Goal: Absence or prevention of progression during hospitalization  Description  INTERVENTIONS:  - Assess and monitor for signs and symptoms of infection  - Monitor lab/diagnostic results  - Monitor all insertion sites, i e  indwelling lines, tubes, and drains  - Monitor nasal secretions for changes in amount and color  - George appropriate cooling/warming therapies per order  - Administer medications as ordered  - Instruct and encourage patient and family to use good hand hygiene technique  - Identify and instruct in appropriate isolation precautions for identified infection/condition   Outcome: Progressing     Problem: SAFETY ADULT  Goal: Maintain or return to baseline ADL function  Description  INTERVENTIONS:  -  Assess patient's ability to carry out ADLs; assess patient's baseline for ADL function and identify physical deficits which impact ability to perform ADLs (bathing, care of mouth/teeth, toileting, grooming, dressing, etc )  - Assess/evaluate cause of self-care deficits   - Assess range of motion  - Assess patient's mobility; develop plan if impaired  - Assess patient's need for assistive devices and provide as appropriate  - Encourage maximum independence but intervene and supervise when necessary  ¯ Involve family in performance of ADLs  ¯ Assess for home care needs following discharge   ¯ Request OT consult to assist with ADL evaluation and planning for discharge  ¯ Provide patient education as appropriate  Outcome: Progressing  Goal: Maintain or return mobility status to optimal level  Description  INTERVENTIONS:  - Assess patient's baseline mobility status (ambulation, transfers, stairs, etc )    - Identify cognitive and physical deficits and behaviors that affect mobility  - Identify mobility aids required to assist with transfers and/or ambulation (gait belt, sit-to-stand, lift, walker, cane, etc )  - Sacul fall precautions as indicated by assessment  - Instruct patient to call for assistance with activity based on assessment  - Request Rehabilitation consult to assist with strengthening/weightbearing, etc    Outcome: Progressing     Problem: DISCHARGE PLANNING  Goal: Discharge to home or other facility with appropriate resources  Description  INTERVENTIONS:  - Identify barriers to discharge w/patient and caregiver  - Arrange for needed discharge resources and transportation as appropriate  - Identify discharge learning needs (meds, wound care, etc )  - Refer to Case Management Department for coordinating discharge planning if the patient needs post-hospital services based on physician/advanced practitioner order or complex needs related to functional status, cognitive ability, or social support system   Outcome: Progressing     Problem: Knowledge Deficit  Goal: Patient/family/caregiver demonstrates understanding of disease process, treatment plan, medications, and discharge instructions  Description  Complete learning assessment and assess knowledge base    Interventions:  - Provide teaching at level of understanding  - Provide teaching via preferred learning methods  Outcome: Progressing     Problem: DISCHARGE PLANNING - CARE MANAGEMENT  Goal: Discharge to post-acute care or home with appropriate resources  Description  INTERVENTIONS:  - Conduct assessment to determine patient/family and health care team treatment goals, and need for post-acute services based on payer coverage, community resources, and patient preferences, and barriers to discharge  - Address psychosocial, clinical, and financial barriers to discharge as identified in assessment in conjunction with the patient/family and health care team  - Arrange appropriate level of post-acute services according to patient's   needs and preference and payer coverage in collaboration with the physician and health care team  - Communicate with and update the patient/family, physician, and health care team regarding progress on the discharge plan  - Arrange appropriate transportation to post-acute venues  Return to home with dtrs   Outcome: Progressing

## 2019-02-26 NOTE — TELEPHONE ENCOUNTER
Zara from [facility] care needs a call back w/ Verbal order for home care  Nursing and PT   Pt will be D/c 2/27/19       Contact information is:   Nanci Nguyen [phone]

## 2019-02-26 NOTE — ASSESSMENT & PLAN NOTE
· Patient was found to have AST 68  on 2/26  This was likely due to infectious process    No concern for further intervention at this time

## 2019-02-26 NOTE — PROGRESS NOTES
[facility] Practice Progress Note - Author Kev 80 y o  female MRN: [id number]    Unit/Bed#: [address] [place] [address] Encounter: [id number]      Assessment/Plan:    * Acute on chronic respiratory failure with hypoxia and hypercapnia ([address] )  Assessment & Plan  H/o chronic respiratory failure 2/2 muscular dystrophy vs obesity hypoventilation syndrome presents with SOB & hypoxia likely due to bronchitis  · Continue home BiPAP setting of 10/5 tonight   · Overnight patient had increased demand of oxygen requiring up to 4 L of supplementation  On exam patient had increased rales bilaterally and was given Lasix 40 mg IV in addition to regular Lasix dose  Chest XR 2/25/19- Moderate to severe cardiomegaly with mild pulmonary vascular congestion with small right pleural effusion suggesting mild to moderate CHF  Right lower lobe lung opacity possibly related to atelectasis and/or pneumonia    · BNP 1996  · Lasix maintenance dose increased to Lasix 40mg IV qd  · Procalcitonin pending  · Oxygen supplementation increased overnight, currently patient is back to usual dose of 2L   · WBC 13 1 this AM, otherwise labs WNL  · Vitals stable  · Pt completed 4 days of Azithromycin   · Solumedrol decreased to 20mg BID, will continue this today and titrate as necessary   · Continue mucomyst with Xopenex   · Chest PT protocol started   · Influenza/RSV PCR, & urine strep/legionella negative   · Pulmonology consult placed and following, recommendations appreciated   · Continue to monitor closely     Essential hypertension  Assessment & Plan  · 141/65 this AM   · Blood pressure remains stable   · As per Cardiology consult recommendations, will continue current medication regimen  · Continue home Lasix 10mg every other day, Toprol XL 25mg PO BID & Cardizem 240mg PO qd  · Patient given extra dose of Lasix IV 40 mg overnigt    Patient was found to have increased rales on exam and chest x-ray showed possible small right pleural effusion suggesting mild-to-moderate CHF  Right lower lobe lung opacity possibly related to atelectasis  · Continue to monitor vitals q routine shift       Muscular dystrophy, 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  · Pt has been bed bound for > 20 years  · Patient will be discharged to home, family refusing outpatient rehabilitation    Acute on chronic diastolic heart failure ([facility] [address] )  Assessment & Plan  · Cardiac echo ordered  · Overnight patient had increased oxygen demand and was increased to 4 L oxygen supplementation  On exam patient was found to have increased rales bilaterally  Given 1 time dose of Lasix 40 mg IV  Chest x-ray performed which showed moderate to severe cardiomegaly with mild pulmonary vascular congestion with small right pleural effusion suggesting mild-to-moderate CHF  Right lower lobe lung opacity possibly related to atelectasis and/or pneumonia    · Continue home Lasix 10mg every other day, last dose 2/24  · Toprol XL 25mg BID, & Cardizem 240mg PO qd  · Echo in 2016 - EF of 60-65% with grade 2 diastolic dysfunction  Normal systolic function  · Cards consult placed, recommendations appreciated    PORTIA treated with BiPAP  Assessment & Plan  · Chronic  · Continue BiPAP overnight, on home setting of 10/5    · Tolerating BiPAP well    Hyponatremia  Assessment & Plan  · Resolved   · Na+ stable at 137   · Continue to monitor daily BMP    Generalized osteoarthritis  Assessment & Plan  · Stable   · Continue Tylenol 650mg Q6H prn    Morbid obesity ([facility] [address] )  Assessment & Plan  · Pt has limited activity due to MD   · Consult to Nutrition services   · Advise weight loss     Elevated liver enzymes  Assessment & Plan  · Worsening since admission  · AST 68  this a m  · Will continue to monitor with daily CMP        Subjective:   Pt seen and examined at bedside  Overnight patient required increase in oxygen supplementation of up to 4L  She tolerated BiPAP overnight well   Pt is forgetful in the morning during the encounter but says she is feeling better  Objective:     Vitals: Blood pressure 150/67, pulse 76, temperature 98 °F (36 7 °C), temperature source Temporal, resp  rate 20, height 5' 7" (1 702 m), weight 105 kg (231 lb 7 7 oz), SpO2 92 %, not currently breastfeeding  ,Body mass index is 36 26 kg/m²    Wt Readings from Last 3 Encounters:   02/26/19 105 kg (231 lb 7 7 oz)   04/26/18 125 kg (275 lb)   07/29/16 89 kg (196 lb 3 4 oz)       Intake/Output Summary (Last 24 hours) at 2/26/2019 1154  Last data filed at 2/26/2019 4886  Gross per 24 hour   Intake    Output 2920 ml   Net -2920 ml       Physical Exam: General appearance: alert  Lungs: decreased breath sounds in lower lobes b/l, rales and rhonchi in upper lobes b/l   Heart: regular rate and rhythm  Abdomen: Soft, non-tender, positive bowel sounds, obese  Extremities: Edema bilateral     Recent Results (from the past 24 hour(s))   CBC and differential    Collection Time: 02/26/19  5:33 AM   Result Value Ref Range    WBC 13 10 (H) 4 31 - 10 16 Thousand/uL    RBC 4 84 3 81 - 5 12 Million/uL    Hemoglobin 12 9 11 5 - 15 4 g/dL    Hematocrit 43 2 34 8 - 46 1 %    MCV 89 82 - 98 fL    MCH 26 7 (L) 26 8 - 34 3 pg    MCHC 29 9 (L) 31 4 - 37 4 g/dL    RDW 14 6 11 6 - 15 1 %    MPV 9 0 8 9 - 12 7 fL    Platelets 396 773 - 897 Thousands/uL    nRBC 0 /100 WBCs    Neutrophils Relative 90 (H) 43 - 75 %    Immat GRANS % 2 0 - 2 %    Lymphocytes Relative 4 (L) 14 - 44 %    Monocytes Relative 4 4 - 12 %    Eosinophils Relative 0 0 - 6 %    Basophils Relative 0 0 - 1 %    Neutrophils Absolute 11 88 (H) 1 85 - 7 62 Thousands/µL    Immature Grans Absolute 0 19 0 00 - 0 20 Thousand/uL    Lymphocytes Absolute 0 54 (L) 0 60 - 4 47 Thousands/µL    Monocytes Absolute 0 47 0 17 - 1 22 Thousand/µL    Eosinophils Absolute 0 00 0 00 - 0 61 Thousand/µL    Basophils Absolute 0 02 0 00 - 0 10 Thousands/µL   Magnesium    Collection Time: 02/26/19  5:33 AM   Result Value Ref Range    Magnesium 2 1 1 6 - 2 6 mg/dL   Phosphorus    Collection Time: 02/26/19  5:33 AM   Result Value Ref Range    Phosphorus 3 2 2 3 - 4 1 mg/dL   Comprehensive metabolic panel    Collection Time: 02/26/19  5:33 AM   Result Value Ref Range    Sodium 137 136 - 145 mmol/L    Potassium 4 8 3 5 - 5 3 mmol/L    Chloride 95 (L) 100 - 108 mmol/L    CO2 43 (H) 21 - 32 mmol/L    ANION GAP -1 (L) 4 - 13 mmol/L    BUN 27 (H) 5 - 25 mg/dL    Creatinine 0 46 (L) 0 60 - 1 30 mg/dL    Glucose 146 (H) 65 - 140 mg/dL    Calcium 8 9 8 3 - 10 1 mg/dL    AST 68 (H) 5 - 45 U/L     (H) 12 - 78 U/L    Alkaline Phosphatase 87 46 - 116 U/L    Total Protein 6 4 6 4 - 8 2 g/dL    Albumin 2 8 (L) 3 5 - 5 0 g/dL    Total Bilirubin 0 30 0 20 - 1 00 mg/dL    eGFR 90 ml/min/1 73sq m   Procalcitonin    Collection Time: 02/26/19  5:33 AM   Result Value Ref Range    Procalcitonin <0 05 <=0 25 ng/ml   NT-BNP PRO (BNP for [facility], [facility], [facility], [place], [facility], [facility], [facility], [place] [facility])    Collection Time: 02/26/19  5:33 AM   Result Value Ref Range    NT-proBNP 1,996 (H) <450 pg/mL   Procalcitonin    Collection Time: 02/26/19  9:54 AM   Result Value Ref Range    Procalcitonin <0 05 <=0 25 ng/ml       Current Facility-Administered Medications   Medication Dose Route Frequency Provider Last Rate Last Dose    acetaminophen (TYLENOL) tablet 650 mg  650 mg Oral Q6H PRN Gisela Hurtado PA-C   650 mg at 02/22/19 1803    acetylcysteine (MUCOMYST) 200 mg/mL inhalation solution 600 mg  3 mL Nebulization TID Vicki Wright MD   600 mg at 02/26/19 0700    benzonatate (TESSALON PERLES) capsule 100 mg  100 mg Oral TID PRN Regi Robledo MD        celecoxib (CeleBREX) capsule 200 mg  200 mg Oral Daily Regi Robledo MD   200 mg at 02/26/19 6320    diltiazem (CARDIZEM CD) 24 hr capsule 240 mg  240 mg Oral Daily Jennifer Knox PA-C   240 mg at 02/26/19 6935    docusate sodium (COLACE) capsule 100 mg  100 mg Oral BID Jennifer Alvarenga DO   100 mg at 02/26/19 6013    enoxaparin (LOVENOX) subcutaneous injection 40 mg  40 mg Subcutaneous Daily Jennifer Knox PA-C   40 mg at 02/26/19 0939    fluticasone-vilanterol (BREO ELLIPTA) 100-25 mcg/inh inhaler 1 puff  1 puff Inhalation Daily Vicki Wright MD   1 puff at 02/26/19 0940    furosemide (LASIX) tablet 10 mg  10 mg Oral Every Other Day Gisela Hurtado PA-C   10 mg at 02/26/19 1313    guaiFENesin (MUCINEX) 12 hr tablet 600 mg  600 mg Oral Q12H [facility] Vicki Wright MD   600 mg at 02/26/19 0939    ipratropium (ATROVENT) 0 02 % inhalation solution 0 5 mg  0 5 mg Nebulization TID Vicki Wright MD   0 5 mg at 02/26/19 0700    levalbuterol (XOPENEX) inhalation solution 0 63 mg  0 63 mg Nebulization Q4H PRN Regi Robledo MD   0 63 mg at 02/24/19 1150    levalbuterol (XOPENEX) inhalation solution 0 63 mg  0 63 mg Nebulization TID Vicki Wright MD   0 63 mg at 02/26/19 0700    methylPREDNISolone sodium succinate (Solu-MEDROL) injection 20 mg  20 mg Intravenous Q12H [facility] Vicki Wright MD   20 mg at 02/26/19 0940    metoprolol succinate (TOPROL-XL) 24 hr tablet 25 mg  25 mg Oral BID Gisela Hurtado PA-C   25 mg at 02/26/19 4195    nystatin (MYCOSTATIN) powder   Topical 4x Daily Jennifer Knox PA-C        perflutren lipid microsphere (DEFINITY) injection  0 75 mL/min Intravenous Once in imaging Gail Munoz MD        silver sulfadiazine (SILVADENE,SSD) 1 % cream   Topical Daily Gisela Hrutado PA-C           Invasive Devices     Peripherally Inserted Central Catheter Line            PICC Line 02/22/19 3 days          Drain            External Urinary Catheter less than 1 day                Lab, Imaging and other studies: I have personally reviewed pertinent reports      VTE Pharmacologic Prophylaxis: Enoxaparin (Lovenox)  VTE Mechanical Prophylaxis: sequential compression device    Jennifer Alvarenga DO

## 2019-02-26 NOTE — PROGRESS NOTES
Progress Note - Cardiology   [facility]     Minor Smith 80 y o  female MRN: [id number]  : 8/10/1931  Unit/Bed#: [address] [place] [address] Encounter: [id number]    Assessment and Plan:   1  Acute hypercarbic respiratory failure now resolved  Patient is recommended to use BiPAP regularly      2  History of obstructive sleep apnea on BiPAP  Continue using BiPAP      3  Acute bronchitis  Repeat CT scan pending  On supportive care discussed with Pulmonary      4  Muscular dystrophy limb girdle type     5  RBBB which is chronic     6  History of tachycardia and at least moderate to severe pulmonary hypertension will repeat echo Doppler     7  Essential hypertension  Pretty well controlled with current therapy  Partially elevation may be related to patient being constipated and some anxious currently blood pressure is acceptable       8  Acute on chronic diastolic heart failure which responded well to IV Lasix  Will give additional albumin dose  Along with Lasix  Then p o  Lasix from tomorrow  Awaiting CT scan chest report  Plan  Will give albumin  Patient already received some Lasix  P o  Increased dose of Lasix from tomorrow continue other medication as before  Prelim echo report shows normal LV systolic function no significant valvular disease not change from previous echo of 2016  Subjective / Objective:   Patient seen and evaluated  She was given Lasix yesterday  She diuresed about 2 L  Still has cough with expectoration  Chest x-ray was read as congestive heart failure possible asymmetric edema  CT of the chest is pending  Even after 2 2 L negative she is requiring oxygen  She is not tachypneic  Vitals: Blood pressure 150/67, pulse 76, temperature 98 °F (36 7 °C), temperature source Temporal, resp  rate 20, height 5' 7" (1 702 m), weight 105 kg (231 lb 7 7 oz), SpO2 98 %, not currently breastfeeding    Vitals:    19 0600 19 0249   Weight: 105 kg (231 lb 7 7 oz) 105 kg (231 lb 7 7 oz)     Body mass index is 36 26 kg/m²  BP Readings from Last 3 Encounters:   02/26/19 150/67   04/26/18 146/65   02/07/18 120/80     Orthostatic Blood Pressures      Most Recent Value   Blood Pressure  150/67 filed at 02/26/2019 4978   Patient Position - Orthostatic VS  Lying filed at 02/26/2019 0938        I/O       02/24 0701 - 02/25 0700 02/25 0701 - 02/26 0700 02/26 0701 - 02/27 0700    P  O        I V  (mL/kg)       Total Intake(mL/kg)       Urine (mL/kg/hr) 1250 (0 5) 2920 (1 2)     Total Output 1250 2920     Net -1250 -2920                Invasive Devices     Peripherally Inserted Central Catheter Line            PICC Line 02/22/19 3 days          Drain            External Urinary Catheter less than 1 day                  Intake/Output Summary (Last 24 hours) at 2/26/2019 1416  Last data filed at 2/26/2019 5566  Gross per 24 hour   Intake    Output 2420 ml   Net -2420 ml         Physical Exam:   Physical Exam    Neurologic:  Alert & oriented x 3,  no focal deficits noted, not any distress mostly bed-bound  Constitutional:  Well developed, well nourished,  With no acute distress  Eyes:  PERRL, conjunctiva normal   HENT:  Atraumatic, external ears normal, nose normal,   NECK: Normal range of motion, no tenderness, neck is supple , No JVP  Respiratory:  Bilateral air entry with coarse crackles more so on the right lower base as compared to left  Cardiovascular: S1-S2 regular with a 2/6 ejection systolic murmur  S4 is heard  GI:  Soft, nondistended, normal bowel sounds, nontender, no hepatosplenomegaly appreciated  Musculoskeletal:  No tenderness, no deformities      Extremities:  Mild edema and distal pulses are present  Psychiatric:  Speech and behavior appropriate       Medications/ Allergies:       Current Facility-Administered Medications:  acetaminophen 650 mg Oral Q6H PRN Sidney Garrison PA-C   acetylcysteine 3 mL Nebulization TID Martha Mccoy MD   benzonatate 100 mg Oral TID PRN Serena Miller MD   celecoxib 200 mg Oral Daily Serena Miller MD   diltiazem 240 mg Oral Daily Jennifer Knox PA-C   docusate sodium 100 mg Oral BID Jennifer Alvarenga DO   enoxaparin 40 mg Subcutaneous Daily Jennifer Knox PA-C   fluticasone-vilanterol 1 puff Inhalation Daily Loree Morgan MD   [START ON 2/27/2019] furosemide 40 mg Oral Daily Jennifer Alvarenga DO   guaiFENesin 600 mg Oral Q12H [address] Loree Morgan MD   ipratropium 0 5 mg Nebulization TID Loree Morgan MD   levalbuterol 0 63 mg Nebulization Q4H PRN Serena Miller MD   levalbuterol 0 63 mg Nebulization TID Loree Morgan MD   methylPREDNISolone sodium succinate 20 mg Intravenous Q12H [address] Loree Morgan MD   metoprolol succinate 25 mg Oral BID Jennifer Knox PA-C   nystatin  Topical 4x Daily Jennifer Knox PA-C   perflutren lipid microsphere 0 75 mL/min Intravenous Once in imaging Tessa Rojas MD   silver sulfadiazine  Topical Daily Jennifer Knox PA-C       acetaminophen 650 mg Q6H PRN   benzonatate 100 mg TID PRN   levalbuterol 0 63 mg Q4H PRN   perflutren lipid microsphere 0 75 mL/min Once in imaging     No Known Allergies    VTE Pharmacologic Prophylaxis:   Lovenox    Labs:   Troponins:  Results from last 7 days   Lab Units 02/22/19  0037   TROPONIN I ng/mL <0 02       CBC with diff:  Results from last 7 days   Lab Units 02/26/19  0533 02/25/19  0631 02/24/19  0528 02/22/19  0539 02/21/19  2327 02/21/19  2323   WBC Thousand/uL 13 10* 7 23 4 54 9 14 9 63  --    HEMOGLOBIN g/dL 12 9 11 7 11 3* 12 9 13 3  --    I STAT HEMOGLOBIN g/dl  --   --   --   --   --  12 2   HEMATOCRIT % 43 2 39 4 36 9 43 2 42 7  --    HEMATOCRIT, ISTAT %  --   --   --   --   --  36   MCV fL 89 89 87 89 85  --    PLATELETS Thousands/uL 272 248 241 210 213  --    MCH pg 26 7* 26 4* 26 7* 26 7* 26 6*  --    MCHC g/dL 29 9* 29 7* 30 6* 29 9* 31 1*  --    RDW % 14 6 15 0 14 9 14 6 18 4*  --    MPV fL 9 0 8 9 8 8* 9 1 9 3  --    NRBC AUTO /100 WBCs 0 0 0 0 0  --        CMP:  Results from last 7 days   Lab Units 02/26/19  0533 02/25/19  0631 02/24/19  5477 02/22/19  0539 02/22/19  0037 02/21/19  2323   SODIUM mmol/L 137 137 137 132* 129*  --    POTASSIUM mmol/L 4 8 4 7 4 4 4 5 4 6  --    CHLORIDE mmol/L 95* 99* 97* 93* 92*  --    CO2 mmol/L 43* 39* 37* 36* 38*  --    CO2, I-STAT mmol/L  --   --   --   --   --  39*   ANION GAP mmol/L -1* -1* 3* 3* -1*  --    BUN mg/dL 27* 28* 28* 10 11  --    CREATININE mg/dL 0 46* 0 50* 0 52* 0 27* 0 25*  --    GLUCOSE, ISTAT mg/dl  --   --   --   --   --  121   CALCIUM mg/dL 8 9 8 7 8 8 8 8 8 8  --    AST U/L 68*  --   --  13 16  --    ALT U/L 184*  --   --  23 22  --    ALK PHOS U/L 87  --   --  104 108  --    TOTAL PROTEIN g/dL 6 4  --   --  6 8 6 7  --    ALBUMIN g/dL 2 8*  --   --  2 8* 2 7*  --    TOTAL BILIRUBIN mg/dL 0 30  --   --  0 20 0 30  --    EGFR ml/min/1 73sq m 90 87 86 107 110  --        Magnesium:  Results from last 7 days   Lab Units 02/26/19  0533 02/25/19  0631 02/24/19  0528 02/22/19  0539   MAGNESIUM mg/dL 2 1 2 0 1 9 1 8     Coags:  Results from last 7 days   Lab Units 02/22/19 0037   PTT seconds 28   INR  1 01     NT-proBNP:   Recent Labs     02/26/19  0533   NTBNP 1,996*        Imaging & Testing   I have personally reviewed pertinent reports  Xr Chest Portable    Result Date: 2/25/2019  Narrative: CHEST INDICATION: Shortness of breath, chest pain rule out pneumonia/ fluid overload  COMPARISON:  February 22, 2019 EXAM PERFORMED/VIEWS:  XR CHEST PORTABLE FINDINGS: Moderate to severe cardiomegaly with mild pulmonary vascular congestion with small right pleural effusion suggesting mild to moderate CHF  Right lower lobe lung opacity could relate to atelectasis and/or pneumonia  No pneumothorax  CT chest with contrast recommended for further evaluation       Impression: Moderate to severe cardiomegaly with mild pulmonary vascular congestion with small right pleural effusion suggesting mild to moderate CHF  Right lower lobe lung opacity could relate to atelectasis and/or pneumonia  No pneumothorax  CT chest with contrast recommended for further evaluation  Workstation performed: [id number]         EKG / Monitor: Personally reviewed  Normal sinus rhythm right bundle-branch block  Not change from old EKG    Cardiac testing:   Results for orders placed during the hospital encounter of 16   Echo complete with contrast if indicated    Narrative [address]  [address] [facility] [address]  [phone]    Transthoracic Echocardiogram  Limited 2D, M-mode, Doppler, and Color Doppler    Study date:  2016    Patient: Claire Carver  MR number: [id number]  Account number: [de-identified]  : 10-Aug-1931  Age: 80 years  Gender: Female  Status: Routine  Location: Echo lab  Height: 68 in  Weight: 339 2 lb  BP: 113/ 59 mmHg    Indications: Pulmonary HTN    Diagnoses: 785 2 - CARDIAC MURMURS NEC    Sonographer:  Rolo Reyes  Primary Physician:  Erickson Daugherty DO  Referring Physician:  Erickson Daugherty DO  Group:  [facility]  RN:  Elton Paulino RN  Interpreting Physician:  Manju Martinez MD    SUMMARY    PROCEDURE INFORMATION:  This was a technically difficult study  LEFT VENTRICLE:  Systolic function was normal  Ejection fraction was estimated in the range of  60 % to 65 % to be 60 %  Although no diagnostic regional wall motion  abnormality was identified, this possibility cannot be completely excluded on  the basis of this study  Wall thickness was mildly increased  There was mild concentric hypertrophy  Features were consistent with a pseudonormal left ventricular filling pattern,  with concomitant abnormal relaxation and increased filling pressure (grade 2  diastolic dysfunction)  LEFT ATRIUM:  The atrium was mildly dilated  MITRAL VALVE:  There was mild annular calcification  There was mild regurgitation      TRICUSPID VALVE:  There was moderate regurgitation  Estimated peak PA pressure was 60 mmHg  HISTORY: PRIOR HISTORY: Muscular Dystrophy, Arthritis    PROCEDURE: The procedure was performed in the echo lab  This was a routine  study  The transthoracic approach was used  The study included limited 2D  imaging, M-mode, limited spectral Doppler, and color Doppler  The heart rate  was 67 bpm, at the start of the study  Images were not obtained from the  parasternal, subcostal, or suprasternal notch acoustic windows  Intravenous  contrast (Definity solution [1 3 ml Definity/8 7ml normal saline solution]) was  administered  Intravenous contrast ( 4 ml) was administered to opacify the left  ventricle  Echocardiographic views were limited due to restricted patient  mobility, poor acoustic window availability, and decreased penetration  This  was a technically difficult study  LEFT VENTRICLE: Size was normal  Systolic function was normal  Ejection  fraction was estimated in the range of 60 % to 65 % to be 60 %  Although no  diagnostic regional wall motion abnormality was identified, this possibility  cannot be completely excluded on the basis of this study  Wall thickness was  mildly increased  There was mild concentric hypertrophy  DOPPLER: Features were  consistent with a pseudonormal left ventricular filling pattern, with  concomitant abnormal relaxation and increased filling pressure (grade 2  diastolic dysfunction)  RIGHT VENTRICLE: The size was normal     LEFT ATRIUM: The atrium was mildly dilated  RIGHT ATRIUM: Size was at the upper limits of normal     MITRAL VALVE: There was mild annular calcification  DOPPLER: There was mild  regurgitation  AORTIC VALVE: DOPPLER: There was no evidence for stenosis  There was no  regurgitation  TRICUSPID VALVE: DOPPLER: There was moderate regurgitation  Estimated peak PA  pressure was 60 mmHg  PULMONIC VALVE: Not well visualized      PERICARDIUM: There was no pericardial effusion  AORTA: The root exhibited normal size  SYSTEM MEASUREMENT TABLES    Unspecified Scan Mode  MV Peak A Mayo: 654 mm/s  MV Peak E Mayo  Mean: 901 mm/s  MVA (PHT): 4 15 cm squared  PHT: 53 ms  Max P mm[Hg]  V Max: 3430 mm/s  Vmax: 3150 mm/s    [employer] Accredited Echocardiography Laboratory    Prepared and electronically signed by    Leopoldo Boas, MD  Signed 01-Aug-2016 10:33:45           Dr Leopoldo Boas, MD [facility]      "This note has been constructed using a voice recognition system  Therefore there may be syntax, spelling, and/or grammatical errors   Please call if you have any questions  "

## 2019-02-26 NOTE — PROGRESS NOTES
Patient noted on physical exam to have rales bilaterally and increased O2 requirements from 2L to 4L to keep above 90%  Ordered STAT CXR consistent with new mild/moderate CHF  Prior procal wnl  Patient's creatinine 0 50, Blood pressure in 301P systolic  Last dose of lasix 10 mg      A/P  - IV Lasix 40 mg x1, ordered BNP and procal for AM

## 2019-02-26 NOTE — PLAN OF CARE
Problem: RESPIRATORY - ADULT  Goal: Achieves optimal ventilation and oxygenation  Description  INTERVENTIONS:  - Assess for changes in respiratory status  - Assess for changes in mentation and behavior  - Position to facilitate oxygenation and minimize respiratory effort  - Oxygen administration by appropriate delivery method based on oxygen saturation (per order) or ABGs  - Encourage broncho-pulmonary hygiene including cough, deep breathe, Incentive Spirometry  - Assess the need for suctioning and aspirate as needed  - Assess and instruct to report SOB or any respiratory difficulty  - Respiratory Therapy support as indicated   - Continue with bipap for hours of sleep  - Continue with nebulizer treatments  - Continue with airway clearance     Outcome: Progressing     Problem: PAIN - ADULT  Goal: Verbalizes/displays adequate comfort level or baseline comfort level  Description  Interventions:  - Encourage patient to monitor pain and request assistance  - Assess pain using appropriate pain scale  - Administer analgesics based on type and severity of pain and evaluate response  - Implement non-pharmacological measures as appropriate and evaluate response  - Notify physician/advanced practitioner if interventions unsuccessful or patient reports new pain   Outcome: Progressing     Problem: INFECTION - ADULT  Goal: Absence or prevention of progression during hospitalization  Description  INTERVENTIONS:  - Assess and monitor for signs and symptoms of infection  - Monitor lab/diagnostic results  - Monitor all insertion sites, i e  indwelling lines, tubes, and drains  - Monitor nasal secretions for changes in amount and color  - [place] appropriate cooling/warming therapies per order  - Administer medications as ordered  - Instruct and encourage patient and family to use good hand hygiene technique  - Identify and instruct in appropriate isolation precautions for identified infection/condition   Outcome: Progressing Problem: SAFETY ADULT  Goal: Maintain or return to baseline ADL function  Description  INTERVENTIONS:  -  Assess patient's ability to carry out ADLs; assess patient's baseline for ADL function and identify physical deficits which impact ability to perform ADLs (bathing, care of mouth/teeth, toileting, grooming, dressing, etc )  - Assess/evaluate cause of self-care deficits   - Assess range of motion  - Assess patient's mobility; develop plan if impaired  - Assess patient's need for assistive devices and provide as appropriate  - Encourage maximum independence but intervene and supervise when necessary  ¯ Involve family in performance of ADLs  ¯ Assess for home care needs following discharge   ¯ Request OT consult to assist with ADL evaluation and planning for discharge  ¯ Provide patient education as appropriate  Outcome: Progressing  Goal: Maintain or return mobility status to optimal level  Description  INTERVENTIONS:  - Assess patient's baseline mobility status (ambulation, transfers, stairs, etc )    - Identify cognitive and physical deficits and behaviors that affect mobility  - Identify mobility aids required to assist with transfers and/or ambulation (gait belt, sit-to-stand, lift, walker, cane, etc )  - Guin fall precautions as indicated by assessment  - Instruct patient to call for assistance with activity based on assessment  - Request Rehabilitation consult to assist with strengthening/weightbearing, etc    Outcome: Progressing     Problem: DISCHARGE PLANNING  Goal: Discharge to home or other facility with appropriate resources  Description  INTERVENTIONS:  - Identify barriers to discharge w/patient and caregiver  - Arrange for needed discharge resources and transportation as appropriate  - Identify discharge learning needs (meds, wound care, etc )  - Refer to Case Management Department for coordinating discharge planning if the patient needs post-hospital services based on physician/advanced practitioner order or complex needs related to functional status, cognitive ability, or social support system   Outcome: Progressing     Problem: Knowledge Deficit  Goal: Patient/family/caregiver demonstrates understanding of disease process, treatment plan, medications, and discharge instructions  Description  Complete learning assessment and assess knowledge base    Interventions:  - Provide teaching at level of understanding  - Provide teaching via preferred learning methods  Outcome: Progressing     Problem: DISCHARGE PLANNING - CARE MANAGEMENT  Goal: Discharge to post-acute care or home with appropriate resources  Description  INTERVENTIONS:  - Conduct assessment to determine patient/family and health care team treatment goals, and need for post-acute services based on payer coverage, community resources, and patient preferences, and barriers to discharge  - Address psychosocial, clinical, and financial barriers to discharge as identified in assessment in conjunction with the patient/family and health care team  - Arrange appropriate level of post-acute services according to patient's   needs and preference and payer coverage in collaboration with the physician and health care team  - Communicate with and update the patient/family, physician, and health care team regarding progress on the discharge plan  - Arrange appropriate transportation to post-acute venues  Return to home with dtrs   Outcome: Progressing

## 2019-02-26 NOTE — PROGRESS NOTES
Progress Note - Pulmonary   Khushboo Ammon 80 y o  female MRN: [id number]  Unit/Bed#: [address] [place] [address] Encounter: [id number]      Assessment/Plan:     Acute bronchitis  Assessment & Plan  -completed total 5 days of azithromycin    Acute on chronic diastolic heart failure (HCC)  Assessment & Plan  -patient was diuresed with 40 mg IV Lasix  -diuresed 2 2 L overall  -still remains hypoxemic with SpO2 85% on room air and requiring 3 L for maintenance of 96% resting  -chest x-ray with asymmetric pulmonary edema with question of mild congestion verses atelectasis and/or possibly pneumonia    PORTIA treated with BiPAP  Assessment & Plan  -patient utilizes BiPAP at home 10/5  -family typically bleeds 2 L cannula oxygen at night    Muscular dystrophy, limb girdle  Assessment & Plan  -patient has a history of muscular dystrophy  -would likely benefit from cougholator verses chest percussive therapy    * Acute on chronic respiratory failure with hypoxia and hypercapnia (HCC)  Assessment & Plan  -patient utilizing BiPAP 10/5/28%  -her hypercapnia appears to be stable  -still remains hypoxemic to 85% on room air resting, 96% on 3 L nasal cannula  -chest x-ray with asymmetric pulmonary edema with question of atelectasis versus pneumonia  -pursue end of CT scan  -check procalcitonin  -elevated white count without obvious source  -no fevers            ______________________________________________________________________    Subjective: Pt seen and examined at bedside  Patient complains of some shortness of breath  Tele Events:     Vitals:   Temp:  [97 5 °F (36 4 °C)-97 9 °F (36 6 °C)] 97 5 °F (36 4 °C)  HR:  [72-87] 84  Resp:  [18] 18  BP: (133-163)/(61-72) 141/65  FiO2 (%):  [28] 28  Weight (last 2 days)     Date/Time   Weight    02/26/19 0249   105 (231 48)    02/25/19 0600   105 (231 48)            Oxygen Therapy  SpO2: 94 %  O2 Flow Rate (L/min): 3 L/min        IV Infusions:       Nutrition:        Diet Orders   (From admission, onward)            Start     Ordered    02/22/19 1308  Diet Regular; Regular House  Diet effective now     Question Answer Comment   Diet Type Regular    Regular Regular House    RD to adjust diet per protocol? Yes        02/22/19 1307    02/22/19 1126  Room Service  Once     Question:  Type of Service  Answer:  Room Service - Appropriate with Assistance    02/22/19 1126          Ins/Outs:   I/O       02/24 0701 - 02/25 0700 02/25 0701 - 02/26 0700 02/26 0701 - 02/27 0700    P  O        I V  (mL/kg)       Total Intake(mL/kg)       Urine (mL/kg/hr) 1250 (0 5) 2920 (1 2)     Total Output 1250 2920     Net -1250 -2920                  Lines/Drains:  Invasive Devices     Peripherally Inserted Central Catheter Line            PICC Line 02/22/19 3 days          Drain            External Urinary Catheter less than 1 day                 Active medications:  Scheduled Meds:  Current Facility-Administered Medications:  acetaminophen 650 mg Oral Q6H PRN Kari LebanonDONALDO   acetylcysteine 3 mL Nebulization TID Albaro Galo MD   benzonatate 100 mg Oral TID PRN Karon Roe MD   celecoxib 200 mg Oral Daily Karon Roe MD   diltiazem 240 mg Oral Daily Jennifer Knox PA-C   docusate sodium 100 mg Oral BID Jennifer Alvarenga DO   enoxaparin 40 mg Subcutaneous Daily Jennifer Knox PA-C   fluticasone-vilanterol 1 puff Inhalation Daily Albaro Galo MD   furosemide 10 mg Oral Every Other Day Jennifer Knox PA-C   guaiFENesin 600 mg Oral Q12H [facility] Albaro Galo MD   ipratropium 0 5 mg Nebulization TID Albaro Galo MD   levalbuterol 0 63 mg Nebulization Q4H PRN Karon Roe MD   levalbuterol 0 63 mg Nebulization TID Albaro Galo MD   methylPREDNISolone sodium succinate 20 mg Intravenous Q12H [facility] Albaro Galo MD   metoprolol succinate 25 mg Oral BID Jennifer Knox PA-C   nystatin  Topical 4x Daily Jennifer Knox PA-C   silver sulfadiazine  Topical Daily Kari LebanonDONALDO     PRN Meds:  acetaminophen 650 mg Q6H PRN   benzonatate 100 mg TID PRN   levalbuterol 0 63 mg Q4H PRN     ____________________________________________________________________      Physical Exam   Constitutional: She is oriented to person, place, and time  She appears well-developed  Obese body habitus   HENT:   Head: Normocephalic and atraumatic  Eyes: Pupils are equal, round, and reactive to light  Conjunctivae are normal    Neck: Normal range of motion  Neck supple  Cardiovascular: Normal rate, regular rhythm and normal heart sounds  Exam reveals no gallop and no friction rub  No murmur heard  Pulmonary/Chest: Tachypnea noted  No respiratory distress  She has decreased breath sounds in the right middle field and the right lower field  She has wheezes  Patient with mild tachypnea at rest   Abdominal: Soft  Bowel sounds are normal  She exhibits no distension and no mass  There is no tenderness  There is no rebound and no guarding  Musculoskeletal: Normal range of motion  She exhibits edema  Trac1 lower extremity edema   Neurological: She is alert and oriented to person, place, and time  Skin: Skin is warm and dry     Psychiatric: She has a normal mood and affect            ____________________________________________________________________    Labs:   CBC: Results from last 7 days   Lab Units 02/26/19  0533 02/25/19  0631 02/24/19  0528   WBC Thousand/uL 13 10* 7 23 4 54   HEMOGLOBIN g/dL 12 9 11 7 11 3*   HEMATOCRIT % 43 2 39 4 36 9   MCV fL 89 89 87   PLATELETS Thousands/uL 272 248 241     CMP: Results from last 7 days   Lab Units 02/26/19  0533 02/25/19  0631 02/24/19  0528 02/22/19  0539 02/22/19  0037  02/21/19  2323   POTASSIUM mmol/L 4 8 4 7 4 4 4 5 4 6   < >  --    CHLORIDE mmol/L 95* 99* 97* 93* 92*   < >  --    CO2 mmol/L 43* 39* 37* 36* 38*   < >  --    CO2, I-STAT mmol/L  --   --   --   --   --   --  39*   BUN mg/dL 27* 28* 28* 10 11   < >  --    CREATININE mg/dL 0 46* 0 50* 0 52* 0 27* 0 25* < >  --    GLUCOSE, ISTAT mg/dl  --   --   --   --   --   --  121   CALCIUM mg/dL 8 9 8 7 8 8 8 8 8 8   < >  --    AST U/L 68*  --   --  13 16  --   --    ALT U/L 184*  --   --  23 22  --   --    ALK PHOS U/L 87  --   --  104 108   < >  --    EGFR ml/min/1 73sq m 90 87 86 107 110   < >  --     < > = values in this interval not displayed  No components found for: ABG    Magnesium:   Results from last 7 days   Lab Units 02/26/19  0533   MAGNESIUM mg/dL 2 1     Phosphorous:   Results from last 7 days   Lab Units 02/26/19 0533   PHOSPHORUS mg/dL 3 2     Troponin:   Results from last 7 days   Lab Units 02/22/19  0037   TROPONIN I ng/mL <0 02     PT/INR:   Results from last 7 days   Lab Units 02/22/19 0037   PTT seconds 28   INR  1 01     Lactic Acid:     BNP:   Results from last 7 days   Lab Units 02/26/19 0533 02/22/19 0037   NT-PRO BNP pg/mL 1,996* 873*     TSH:     Procalcitonin: Invalid input(s): PROCALCITONIN      Imaging:   XR chest portable   Final Result by Julio Carrillo MD (02/25 2213)      Moderate to severe cardiomegaly with mild pulmonary vascular congestion with small right pleural effusion suggesting mild to moderate CHF  Right lower lobe lung opacity could relate to atelectasis and/or pneumonia  No pneumothorax  CT chest with contrast recommended for further evaluation  Workstation performed: [id number]         IR PICC line   Final Result by Álvaro Sinha DO (02/22 1732)   Successful placement of right arm midline catheter  The catheter is ready for use       _______________________________________________________________   PROCEDURE DETAILS:    Operators: Dr Deepak Graff, [address] [place] [address] attending, performed the procedure  Anesthesia: 1% lidocaine was injected in the skin and subcutaneous tissues overlying the access site     Medications: 1% lidocaine   Contrast: 0 mL of Omnipaque 300   Fluoroscopy time: 0 minutes   Images: 2      COMMENTS:   Following the discussion of the risks, benefits and alternatives to the procedure, written informed consent was obtained from the patient and her daughter  The procedure was done at the bedside in the ICU  The right arm veins were evaluated as a    potential access site with ultrasound  The site was prepped and draped in the usual sterile fashion  All elements of maximal sterile barrier technique, cap and mask and sterile gown and sterile gloves and sterile full-body drape and hand hygiene and 2%    chlorhexidine for cutaneous antisepsis  Sterile ultrasound technique with sterile gel and sterile probe covers was also utilized  A preprocedure timeout was performed per [facility] protocol  Lidocaine was administered to the skin and a small skin incision was made  Under ultrasound guidance, the right basilic vein was punctured using a micropuncture set  Static images of real time needle entry into the vessel were obtained  A peel-away    sheath was then placed over a guidewire  A double lumen power catheter measuring   5 cm in length was then placed through the peel-away sheath  The peel-away sheath and guidewire were then removed  The catheter was aspirated and then flushed with    saline  The catheter was secured to the skin and a sterile dressing was applied  Workstation performed: [id number]         XR chest 1 view portable   Final Result by Claire Smith MD (02/22 0848)   Stable cardiomegaly without acute pulmonary disease  Workstation performed: [id number]               Micro: Lab Results   Component Value Date    BLOODCX No Growth After 5 Days  04/26/2018    BLOODCX No Growth After 5 Days  04/26/2018    BLOODCX No Growth After 5 Days  01/27/2016    BLOODCX No Growth After 5 Days  01/27/2016    URINECX >100,000 cfu/ml Escherichia coli (A) 04/26/2018    URINECX Culture results to follow   07/28/2016    URINECX >100,000 cfu/ml Escherichia coli 07/28/2016    URINECX 40,000-49,000 cfu/ml Klebsiella pneumoniae 07/28/2016 SPUTUMCULTUR 3+ Growth of Mixed Respiratory Antonella 01/31/2016    MRSACULTURE  02/22/2019     No Methicillin Resistant Staphlyococcus aureus (MRSA) isolated      Results from last 7 days   Lab Units 02/22/19  0229   INFLUENZA B PCR  Not Detected   RSV PCR  Not Detected           Code Status: Level 3 - DNAR and DNI

## 2019-02-26 NOTE — PLAN OF CARE
Patient continues with bipap for hours of sleep, nebulizer treatments, and acapella  Will update POC x 3 days 3/1/19  Claudia Portillo RRT

## 2019-02-27 LAB
ANION GAP SERPL CALCULATED.3IONS-SCNC: 1 MMOL/L (ref 4–13)
BASOPHILS # BLD AUTO: 0.03 THOUSANDS/ΜL (ref 0–0.1)
BASOPHILS NFR BLD AUTO: 0 % (ref 0–1)
BUN SERPL-MCNC: 24 MG/DL (ref 5–25)
CALCIUM SERPL-MCNC: 8.7 MG/DL (ref 8.3–10.1)
CHLORIDE SERPL-SCNC: 93 MMOL/L (ref 100–108)
CO2 SERPL-SCNC: 43 MMOL/L (ref 21–32)
CREAT SERPL-MCNC: 0.37 MG/DL (ref 0.6–1.3)
EOSINOPHIL # BLD AUTO: 0 THOUSAND/ΜL (ref 0–0.61)
EOSINOPHIL NFR BLD AUTO: 0 % (ref 0–6)
ERYTHROCYTE [DISTWIDTH] IN BLOOD BY AUTOMATED COUNT: 14.5 % (ref 11.6–15.1)
GFR SERPL CREATININE-BSD FRML MDRD: 96 ML/MIN/1.73SQ M
GLUCOSE SERPL-MCNC: 139 MG/DL (ref 65–140)
HCT VFR BLD AUTO: 41.7 % (ref 34.8–46.1)
HGB BLD-MCNC: 12.3 G/DL (ref 11.5–15.4)
IMM GRANULOCYTES # BLD AUTO: 0.25 THOUSAND/UL (ref 0–0.2)
IMM GRANULOCYTES NFR BLD AUTO: 2 % (ref 0–2)
LYMPHOCYTES # BLD AUTO: 0.51 THOUSANDS/ΜL (ref 0.6–4.47)
LYMPHOCYTES NFR BLD AUTO: 5 % (ref 14–44)
MAGNESIUM SERPL-MCNC: 2.1 MG/DL (ref 1.6–2.6)
MCH RBC QN AUTO: 26.5 PG (ref 26.8–34.3)
MCHC RBC AUTO-ENTMCNC: 29.5 G/DL (ref 31.4–37.4)
MCV RBC AUTO: 90 FL (ref 82–98)
MONOCYTES # BLD AUTO: 0.48 THOUSAND/ΜL (ref 0.17–1.22)
MONOCYTES NFR BLD AUTO: 5 % (ref 4–12)
NEUTROPHILS # BLD AUTO: 9.08 THOUSANDS/ΜL (ref 1.85–7.62)
NEUTS SEG NFR BLD AUTO: 88 % (ref 43–75)
NRBC BLD AUTO-RTO: 0 /100 WBCS
PHOSPHATE SERPL-MCNC: 3.1 MG/DL (ref 2.3–4.1)
PLATELET # BLD AUTO: 258 THOUSANDS/UL (ref 149–390)
PMV BLD AUTO: 8.7 FL (ref 8.9–12.7)
POTASSIUM SERPL-SCNC: 5.1 MMOL/L (ref 3.5–5.3)
PROCALCITONIN SERPL-MCNC: <0.05 NG/ML
RBC # BLD AUTO: 4.65 MILLION/UL (ref 3.81–5.12)
SODIUM SERPL-SCNC: 137 MMOL/L (ref 136–145)
WBC # BLD AUTO: 10.35 THOUSAND/UL (ref 4.31–10.16)

## 2019-02-27 PROCEDURE — 94668 MNPJ CHEST WALL SBSQ: CPT

## 2019-02-27 PROCEDURE — 99232 SBSQ HOSP IP/OBS MODERATE 35: CPT | Performed by: INTERNAL MEDICINE

## 2019-02-27 PROCEDURE — 94660 CPAP INITIATION&MGMT: CPT

## 2019-02-27 PROCEDURE — 94640 AIRWAY INHALATION TREATMENT: CPT

## 2019-02-27 PROCEDURE — 85025 COMPLETE CBC W/AUTO DIFF WBC: CPT | Performed by: STUDENT IN AN ORGANIZED HEALTH CARE EDUCATION/TRAINING PROGRAM

## 2019-02-27 PROCEDURE — 94760 N-INVAS EAR/PLS OXIMETRY 1: CPT

## 2019-02-27 PROCEDURE — 80048 BASIC METABOLIC PNL TOTAL CA: CPT | Performed by: STUDENT IN AN ORGANIZED HEALTH CARE EDUCATION/TRAINING PROGRAM

## 2019-02-27 PROCEDURE — 84145 PROCALCITONIN (PCT): CPT | Performed by: INTERNAL MEDICINE

## 2019-02-27 PROCEDURE — 84100 ASSAY OF PHOSPHORUS: CPT | Performed by: STUDENT IN AN ORGANIZED HEALTH CARE EDUCATION/TRAINING PROGRAM

## 2019-02-27 PROCEDURE — 83735 ASSAY OF MAGNESIUM: CPT | Performed by: STUDENT IN AN ORGANIZED HEALTH CARE EDUCATION/TRAINING PROGRAM

## 2019-02-27 PROCEDURE — 99233 SBSQ HOSP IP/OBS HIGH 50: CPT | Performed by: FAMILY MEDICINE

## 2019-02-27 RX ORDER — PREDNISONE 20 MG/1
40 TABLET ORAL DAILY
Status: DISCONTINUED | OUTPATIENT
Start: 2019-02-27 | End: 2019-02-28 | Stop reason: HOSPADM

## 2019-02-27 RX ORDER — FUROSEMIDE 20 MG/1
20 TABLET ORAL DAILY
Status: DISCONTINUED | OUTPATIENT
Start: 2019-02-28 | End: 2019-02-28 | Stop reason: HOSPADM

## 2019-02-27 RX ADMIN — LEVALBUTEROL HYDROCHLORIDE 0.63 MG: 0.63 SOLUTION RESPIRATORY (INHALATION) at 07:23

## 2019-02-27 RX ADMIN — METOPROLOL SUCCINATE 25 MG: 25 TABLET, EXTENDED RELEASE ORAL at 09:07

## 2019-02-27 RX ADMIN — METHYLPREDNISOLONE SODIUM SUCCINATE 20 MG: 40 INJECTION, POWDER, FOR SOLUTION INTRAMUSCULAR; INTRAVENOUS at 09:08

## 2019-02-27 RX ADMIN — NYSTATIN: 100000 POWDER TOPICAL at 09:07

## 2019-02-27 RX ADMIN — CELECOXIB 200 MG: 100 CAPSULE ORAL at 09:07

## 2019-02-27 RX ADMIN — ACETAMINOPHEN 650 MG: 325 TABLET, FILM COATED ORAL at 23:33

## 2019-02-27 RX ADMIN — ACETYLCYSTEINE 600 MG: 200 SOLUTION ORAL; RESPIRATORY (INHALATION) at 14:22

## 2019-02-27 RX ADMIN — NYSTATIN: 100000 POWDER TOPICAL at 22:08

## 2019-02-27 RX ADMIN — IPRATROPIUM BROMIDE 0.5 MG: 0.5 SOLUTION RESPIRATORY (INHALATION) at 07:23

## 2019-02-27 RX ADMIN — METOPROLOL SUCCINATE 25 MG: 25 TABLET, EXTENDED RELEASE ORAL at 17:54

## 2019-02-27 RX ADMIN — LEVALBUTEROL HYDROCHLORIDE 0.63 MG: 0.63 SOLUTION RESPIRATORY (INHALATION) at 20:47

## 2019-02-27 RX ADMIN — GUAIFENESIN 600 MG: 600 TABLET, EXTENDED RELEASE ORAL at 20:28

## 2019-02-27 RX ADMIN — DOCUSATE SODIUM 100 MG: 100 CAPSULE, LIQUID FILLED ORAL at 09:07

## 2019-02-27 RX ADMIN — DOCUSATE SODIUM 100 MG: 100 CAPSULE, LIQUID FILLED ORAL at 17:54

## 2019-02-27 RX ADMIN — GUAIFENESIN 600 MG: 600 TABLET, EXTENDED RELEASE ORAL at 09:07

## 2019-02-27 RX ADMIN — IPRATROPIUM BROMIDE 0.5 MG: 0.5 SOLUTION RESPIRATORY (INHALATION) at 14:22

## 2019-02-27 RX ADMIN — IPRATROPIUM BROMIDE 0.5 MG: 0.5 SOLUTION RESPIRATORY (INHALATION) at 20:47

## 2019-02-27 RX ADMIN — ENOXAPARIN SODIUM 40 MG: 40 INJECTION SUBCUTANEOUS at 09:07

## 2019-02-27 RX ADMIN — NYSTATIN: 100000 POWDER TOPICAL at 17:55

## 2019-02-27 RX ADMIN — FLUTICASONE FUROATE AND VILANTEROL TRIFENATATE 1 PUFF: 100; 25 POWDER RESPIRATORY (INHALATION) at 09:06

## 2019-02-27 RX ADMIN — SILVER SULFADIAZINE: 10 CREAM TOPICAL at 09:08

## 2019-02-27 RX ADMIN — DILTIAZEM HYDROCHLORIDE 240 MG: 240 CAPSULE, COATED, EXTENDED RELEASE ORAL at 09:07

## 2019-02-27 RX ADMIN — LEVALBUTEROL HYDROCHLORIDE 0.63 MG: 0.63 SOLUTION RESPIRATORY (INHALATION) at 14:22

## 2019-02-27 RX ADMIN — PREDNISONE 40 MG: 20 TABLET ORAL at 20:28

## 2019-02-27 RX ADMIN — ACETYLCYSTEINE 600 MG: 200 SOLUTION ORAL; RESPIRATORY (INHALATION) at 20:47

## 2019-02-27 RX ADMIN — FUROSEMIDE 40 MG: 40 TABLET ORAL at 09:14

## 2019-02-27 RX ADMIN — ACETYLCYSTEINE 600 MG: 200 SOLUTION ORAL; RESPIRATORY (INHALATION) at 07:24

## 2019-02-27 NOTE — PLAN OF CARE
Problem: RESPIRATORY - ADULT  Goal: Achieves optimal ventilation and oxygenation  Description  INTERVENTIONS:  - Assess for changes in respiratory status  - Assess for changes in mentation and behavior  - Position to facilitate oxygenation and minimize respiratory effort  - Oxygen administration by appropriate delivery method based on oxygen saturation (per order) or ABGs  - Encourage broncho-pulmonary hygiene including cough, deep breathe, Incentive Spirometry  - Assess the need for suctioning and aspirate as needed  - Assess and instruct to report SOB or any respiratory difficulty  - Respiratory Therapy support as indicated   - Continue with bipap for hours of sleep  - Continue with nebulizer treatments  - Continue with airway clearance     Outcome: Progressing     Problem: PAIN - ADULT  Goal: Verbalizes/displays adequate comfort level or baseline comfort level  Description  Interventions:  - Encourage patient to monitor pain and request assistance  - Assess pain using appropriate pain scale  - Administer analgesics based on type and severity of pain and evaluate response  - Implement non-pharmacological measures as appropriate and evaluate response  - Notify physician/advanced practitioner if interventions unsuccessful or patient reports new pain   Outcome: Progressing     Problem: INFECTION - ADULT  Goal: Absence or prevention of progression during hospitalization  Description  INTERVENTIONS:  - Assess and monitor for signs and symptoms of infection  - Monitor lab/diagnostic results  - Monitor all insertion sites, i e  indwelling lines, tubes, and drains  - Monitor nasal secretions for changes in amount and color  - [place] appropriate cooling/warming therapies per order  - Administer medications as ordered  - Instruct and encourage patient and family to use good hand hygiene technique  - Identify and instruct in appropriate isolation precautions for identified infection/condition   Outcome: Progressing Problem: SAFETY ADULT  Goal: Maintain or return to baseline ADL function  Description  INTERVENTIONS:  -  Assess patient's ability to carry out ADLs; assess patient's baseline for ADL function and identify physical deficits which impact ability to perform ADLs (bathing, care of mouth/teeth, toileting, grooming, dressing, etc )  - Assess/evaluate cause of self-care deficits   - Assess range of motion  - Assess patient's mobility; develop plan if impaired  - Assess patient's need for assistive devices and provide as appropriate  - Encourage maximum independence but intervene and supervise when necessary  ¯ Involve family in performance of ADLs  ¯ Assess for home care needs following discharge   ¯ Request OT consult to assist with ADL evaluation and planning for discharge  ¯ Provide patient education as appropriate  Outcome: Progressing  Goal: Maintain or return mobility status to optimal level  Description  INTERVENTIONS:  - Assess patient's baseline mobility status (ambulation, transfers, stairs, etc )    - Identify cognitive and physical deficits and behaviors that affect mobility  - Identify mobility aids required to assist with transfers and/or ambulation (gait belt, sit-to-stand, lift, walker, cane, etc )  - [place] fall precautions as indicated by assessment  - Instruct patient to call for assistance with activity based on assessment  - Request Rehabilitation consult to assist with strengthening/weightbearing, etc    Outcome: Progressing     Problem: DISCHARGE PLANNING  Goal: Discharge to home or other facility with appropriate resources  Description  INTERVENTIONS:  - Identify barriers to discharge w/patient and caregiver  - Arrange for needed discharge resources and transportation as appropriate  - Identify discharge learning needs (meds, wound care, etc )  - Refer to Case Management Department for coordinating discharge planning if the patient needs post-hospital services based on physician/advanced practitioner order or complex needs related to functional status, cognitive ability, or social support system   Outcome: Progressing     Problem: Knowledge Deficit  Goal: Patient/family/caregiver demonstrates understanding of disease process, treatment plan, medications, and discharge instructions  Description  Complete learning assessment and assess knowledge base    Interventions:  - Provide teaching at level of understanding  - Provide teaching via preferred learning methods  Outcome: Progressing     Problem: DISCHARGE PLANNING - CARE MANAGEMENT  Goal: Discharge to post-acute care or home with appropriate resources  Description  INTERVENTIONS:  - Conduct assessment to determine patient/family and health care team treatment goals, and need for post-acute services based on payer coverage, community resources, and patient preferences, and barriers to discharge  - Address psychosocial, clinical, and financial barriers to discharge as identified in assessment in conjunction with the patient/family and health care team  - Arrange appropriate level of post-acute services according to patient's   needs and preference and payer coverage in collaboration with the physician and health care team  - Communicate with and update the patient/family, physician, and health care team regarding progress on the discharge plan  - Arrange appropriate transportation to post-acute venues  Return to home with dtrs   Outcome: Progressing

## 2019-02-27 NOTE — PROGRESS NOTES
Progress Note - Cardiology   [facility]     Beverly Adames 80 y o  female MRN: [id number]  : 8/10/1931  Unit/Bed#: 2 South  Encounter: [id number]    Assessment and Plan:   1  Hypercarbic respiratory failure with chronic hypercarbia need BiPAP in the evening  Currently she is out of failure  She is much more alert     2  History of obstructive sleep apnea on BiPAP  Continue using BiPAP      3  Acute bronchitis versus pneumonia  Management as per Pulmonary  Procalcitonin levels are negative     4  Muscular dystrophy limb girdle type  Patient is mostly bed-bound      5  RBBB which is chronic and not changed     6  History of tachycardia and at least moderate to severe pulmonary hypertension  Repeat echo shows normal LV systolic function  No significant change in pulmonary pressures      7  Essential hypertension  blood pressure is pretty well controlled  Will decrease Lasix to 20 mg daily  Continue Cardizem and metoprolol         8  Chronic diastolic heart failure  Elevated BNP most likely due to RV strain and evidence of fluid overload  She has diet is 12 repeat NT BNP at the request of family monitor input output and electrolytes  Plan  Continue current Rx  Decrease Lasix 20 mg p o  Daily  Follow-up electrolytes  Repeat NT BNP  Concur with other Rx provided  Subjective / Objective:   Patient seen and evaluated  Patient is negative by about 4 L  She is feeling much better  She already got 40 mg Lasix today  Denies any chest pain  She is out of bed to chair  Echo shows no changes  She is not short of breath  Vitals: Blood pressure 130/60, pulse 93, temperature 97 9 °F (36 6 °C), temperature source Oral, resp  rate 16, height 5' 7" (1 702 m), weight 105 kg (231 lb 7 7 oz), SpO2 93 %, not currently breastfeeding  Vitals:    19 0249 19 0600   Weight: 105 kg (231 lb 7 7 oz) 105 kg (231 lb 7 7 oz)     Body mass index is 36 26 kg/m²    BP Readings from Last 3 Encounters:   02/27/19 130/60   04/26/18 146/65   02/07/18 120/80     Orthostatic Blood Pressures      Most Recent Value   Blood Pressure  130/60 filed at 02/27/2019 0906   Patient Position - Orthostatic VS  Sitting filed at 02/27/2019 0906        I/O       02/24 0701 - 02/25 0700 02/25 0701 - 02/26 0700 02/26 0701 - 02/27 0700    P  O        I V  (mL/kg)       Total Intake(mL/kg)       Urine (mL/kg/hr) 1250 (0 5) 2920 (1 2)     Total Output 1250 2920     Net -1250 -2920                Invasive Devices     Peripherally Inserted Central Catheter Line            PICC Line 02/22/19 4 days          Drain            External Urinary Catheter 1 day                  Intake/Output Summary (Last 24 hours) at 2/27/2019 1409  Last data filed at 2/27/2019 1201  Gross per 24 hour   Intake    Output 3800 ml   Net -3800 ml         Physical Exam:   Physical Exam    Alert awake oriented, mostly bed-bound due to neuromuscular disorder  Well-developed  Pupil are equal reactive to light conjunctiva is normal   Chest bilateral air entry clear to auscultation anteriorly  S1-S2 regular with a 2/6 ejection systolic murmur S4 present  Abdomen is obese bowel sounds are positive  Extremities have trace edema distal pulses are present  Her behavior is appropriate  She has severe gait dysfunction due to her muscular disorder    She is mostly bed-bound    Medications/ Allergies:       Current Facility-Administered Medications:  acetaminophen 650 mg Oral Q6H PRN Katia Alexandra PA-C   acetylcysteine 3 mL Nebulization TID Abril Bailey MD   benzonatate 100 mg Oral TID PRN Riley Soulier, MD   celecoxib 200 mg Oral Daily Riley Soulier, MD   diltiazem 240 mg Oral Daily Jennifer Knox PA-C   docusate sodium 100 mg Oral BID Jennifer Alvarenga DO   enoxaparin 40 mg Subcutaneous Daily Jennifer Knox PA-C   fluticasone-vilanterol 1 puff Inhalation Daily Abril Bailey MD   [START ON 2/28/2019] furosemide 20 mg Oral Daily Tessa Rojas MD   guaiFENesin 600 mg Oral Q12H [address] Loree Morgan MD   ipratropium 0 5 mg Nebulization TID Loree Morgan MD   levalbuterol 0 63 mg Nebulization Q4H PRN Serena Miller MD   levalbuterol 0 63 mg Nebulization TID Loree Morgan MD   metoprolol succinate 25 mg Oral BID Jennifer Knox PA-C   nystatin  Topical 4x Daily Jennifer Knox PA-C   perflutren lipid microsphere 0 75 mL/min Intravenous Once in imaging Tessa Rojas MD   predniSONE 40 mg Oral Daily Jennifer Alvarenga DO   silver sulfadiazine  Topical Daily Jennifer Knox PA-C       acetaminophen 650 mg Q6H PRN   benzonatate 100 mg TID PRN   levalbuterol 0 63 mg Q4H PRN   perflutren lipid microsphere 0 75 mL/min Once in imaging     No Known Allergies    VTE Pharmacologic Prophylaxis:   Lovenox    Labs:   Troponins:  Results from last 7 days   Lab Units 02/22/19  0037   TROPONIN I ng/mL <0 02       CBC with diff:  Results from last 7 days   Lab Units 02/27/19  0559 02/26/19  0533 02/25/19  0631 02/24/19  0528 02/22/19  0539 02/21/19  2327 02/21/19  2323   WBC Thousand/uL 10 35* 13 10* 7 23 4 54 9 14 9 63  --    HEMOGLOBIN g/dL 12 3 12 9 11 7 11 3* 12 9 13 3  --    I STAT HEMOGLOBIN g/dl  --   --   --   --   --   --  12 2   HEMATOCRIT % 41 7 43 2 39 4 36 9 43 2 42 7  --    HEMATOCRIT, ISTAT %  --   --   --   --   --   --  36   MCV fL 90 89 89 87 89 85  --    PLATELETS Thousands/uL 258 272 248 241 210 213  --    MCH pg 26 5* 26 7* 26 4* 26 7* 26 7* 26 6*  --    MCHC g/dL 29 5* 29 9* 29 7* 30 6* 29 9* 31 1*  --    RDW % 14 5 14 6 15 0 14 9 14 6 18 4*  --    MPV fL 8 7* 9 0 8 9 8 8* 9 1 9 3  --    NRBC AUTO /100 WBCs 0 0 0 0 0 0  --        CMP:  Results from last 7 days   Lab Units 02/27/19  0559 02/26/19  0533 02/25/19  0631 02/24/19  0528 02/22/19  0539 02/22/19  0037 02/21/19  2323   SODIUM mmol/L 137 137 137 137 132* 129*  --    POTASSIUM mmol/L 5 1 4 8 4 7 4 4 4 5 4 6  --    CHLORIDE mmol/L 93* 95* 99* 97* 93* 92*  --    CO2 mmol/L 43* 43* 39* 37* 36* 38*  --    CO2, I-STAT mmol/L  --   --   --   --   --   --  39*   ANION GAP mmol/L 1* -1* -1* 3* 3* -1*  --    BUN mg/dL 24 27* 28* 28* 10 11  --    CREATININE mg/dL 0 37* 0 46* 0 50* 0 52* 0 27* 0 25*  --    GLUCOSE, ISTAT mg/dl  --   --   --   --   --   --  121   CALCIUM mg/dL 8 7 8 9 8 7 8 8 8 8 8 8  --    AST U/L  --  68*  --   --  13 16  --    ALT U/L  --  184*  --   --  23 22  --    ALK PHOS U/L  --  87  --   --  104 108  --    TOTAL PROTEIN g/dL  --  6 4  --   --  6 8 6 7  --    ALBUMIN g/dL  --  2 8*  --   --  2 8* 2 7*  --    TOTAL BILIRUBIN mg/dL  --  0 30  --   --  0 20 0 30  --    EGFR ml/min/1 73sq m 96 90 87 86 107 110  --        Magnesium:  Results from last 7 days   Lab Units 02/27/19  0559 02/26/19  0533 02/25/19  0631 02/24/19  0528 02/22/19  0539   MAGNESIUM mg/dL 2 1 2 1 2 0 1 9 1 8     Coags:  Results from last 7 days   Lab Units 02/22/19  0037   PTT seconds 28   INR  1 01     NT-proBNP:   Recent Labs     02/26/19  0533   NTBNP 1,996*        Imaging & Testing   I have personally reviewed pertinent reports  Xr Chest Portable    Result Date: 2/25/2019  Narrative: CHEST INDICATION: Shortness of breath, chest pain rule out pneumonia/ fluid overload  COMPARISON:  February 22, 2019 EXAM PERFORMED/VIEWS:  XR CHEST PORTABLE FINDINGS: Moderate to severe cardiomegaly with mild pulmonary vascular congestion with small right pleural effusion suggesting mild to moderate CHF  Right lower lobe lung opacity could relate to atelectasis and/or pneumonia  No pneumothorax  CT chest with contrast recommended for further evaluation  Impression: Moderate to severe cardiomegaly with mild pulmonary vascular congestion with small right pleural effusion suggesting mild to moderate CHF  Right lower lobe lung opacity could relate to atelectasis and/or pneumonia  No pneumothorax  CT chest with contrast recommended for further evaluation   Workstation performed: [id number]         EKG / Monitor: Personally reviewed  Normal sinus rhythm right bundle-branch block  Not change from old EKG    Cardiac testing:   Results for orders placed during the hospital encounter of 16   Echo complete with contrast if indicated    Narrative [address]  [address] [facility]  [address]  [phone]    Transthoracic Echocardiogram  Limited 2D, M-mode, Doppler, and Color Doppler    Study date:  2016    Patient: Tristin Palomo  MR number: [id number]  Account number: [de-identified]  : 10-Aug-1931  Age: 80 years  Gender: Female  Status: Routine  Location: Echo lab  Height: 68 in  Weight: 339 2 lb  BP: 113/ 59 mmHg    Indications: Pulmonary HTN    Diagnoses: 785 2 - CARDIAC MURMURS NEC    Sonographer:  Rolo Zapata  Primary Physician:  Kia Love DO  Referring Physician:  Kia Love DO  Group:  [facility]  RN:  Avila Pope RN  Interpreting Physician:  Garrison Escamilla MD    SUMMARY    PROCEDURE INFORMATION:  This was a technically difficult study  LEFT VENTRICLE:  Systolic function was normal  Ejection fraction was estimated in the range of  60 % to 65 % to be 60 %  Although no diagnostic regional wall motion  abnormality was identified, this possibility cannot be completely excluded on  the basis of this study  Wall thickness was mildly increased  There was mild concentric hypertrophy  Features were consistent with a pseudonormal left ventricular filling pattern,  with concomitant abnormal relaxation and increased filling pressure (grade 2  diastolic dysfunction)  LEFT ATRIUM:  The atrium was mildly dilated  MITRAL VALVE:  There was mild annular calcification  There was mild regurgitation  TRICUSPID VALVE:  There was moderate regurgitation  Estimated peak PA pressure was 60 mmHg  HISTORY: PRIOR HISTORY: Muscular Dystrophy, Arthritis    PROCEDURE: The procedure was performed in the echo lab   This was a routine  study  The transthoracic approach was used  The study included limited 2D  imaging, M-mode, limited spectral Doppler, and color Doppler  The heart rate  was 67 bpm, at the start of the study  Images were not obtained from the  parasternal, subcostal, or suprasternal notch acoustic windows  Intravenous  contrast (Definity solution [1 3 ml Definity/8 7ml normal saline solution]) was  administered  Intravenous contrast ( 4 ml) was administered to opacify the left  ventricle  Echocardiographic views were limited due to restricted patient  mobility, poor acoustic window availability, and decreased penetration  This  was a technically difficult study  LEFT VENTRICLE: Size was normal  Systolic function was normal  Ejection  fraction was estimated in the range of 60 % to 65 % to be 60 %  Although no  diagnostic regional wall motion abnormality was identified, this possibility  cannot be completely excluded on the basis of this study  Wall thickness was  mildly increased  There was mild concentric hypertrophy  DOPPLER: Features were  consistent with a pseudonormal left ventricular filling pattern, with  concomitant abnormal relaxation and increased filling pressure (grade 2  diastolic dysfunction)  RIGHT VENTRICLE: The size was normal     LEFT ATRIUM: The atrium was mildly dilated  RIGHT ATRIUM: Size was at the upper limits of normal     MITRAL VALVE: There was mild annular calcification  DOPPLER: There was mild  regurgitation  AORTIC VALVE: DOPPLER: There was no evidence for stenosis  There was no  regurgitation  TRICUSPID VALVE: DOPPLER: There was moderate regurgitation  Estimated peak PA  pressure was 60 mmHg  PULMONIC VALVE: Not well visualized  PERICARDIUM: There was no pericardial effusion  AORTA: The root exhibited normal size  SYSTEM MEASUREMENT TABLES    Unspecified Scan Mode  MV Peak A Mayo: 654 mm/s  MV Peak E Mayo   Mean: 901 mm/s  MVA (PHT): 4 15 cm squared  PHT: 53 ms  Max PG: 40 mm[Hg]  V Max: 3430 mm/s  Vmax: 3150 mm/s    [employer] Accredited Echocardiography Laboratory    Prepared and electronically signed by    Rj Hernandez MD  Signed 01-Aug-2016 10:33:45           Dr Rj Hernandez MD [facility] [place]      "This note has been constructed using a voice recognition system  Therefore there may be syntax, spelling, and/or grammatical errors   Please call if you have any questions  "

## 2019-02-27 NOTE — PROGRESS NOTES
Progress Note - Pulmonary   Marly Luque 80 y o  female MRN: [id number]  Unit/Bed#: 2 [place] [address] Encounter: [id number]      Assessment/Plan:       Acute bronchitis  Assessment & Plan  -completed total 5 days of azithromycin    Acute on chronic diastolic heart failure (HCC)  Assessment & Plan  -continues to diurese and is -2 0 9 L today  -still remains hypoxemic with SpO2 85% on room air and requiring 3 L for maintenance of 96% resting  -CT of the chest consistent with small bibasilar pleural effusions and atelectasis  -clinically is not consistent with pneumonia    PORTIA treated with BiPAP  Assessment & Plan  -patient utilizes BiPAP at home 10/5  -family typically bleeds 2 L cannula oxygen at night    Muscular dystrophy, limb girdle  Assessment & Plan  -patient has a history of muscular dystrophy  -I did order a cough assist for this patient  -case management follow    Morbid obesity ([address] )  Assessment & Plan  -overall benefit of weight loss including benefit to improve respiratory status    * Acute on chronic respiratory failure with hypoxia and hypercapnia (HCC)  Assessment & Plan  -patient utilizing BiPAP at night  -her hypercapnia appears to be stable  -hypoxemia is improving now requiring 2 liters/minute down from 3 L  -chest x-ray with asymmetric pulmonary edema with question of atelectasis versus pneumonia  -procalcitonin remains less than 0 05  -no fevers          ______________________________________________________________________    Subjective: Pt seen and examined at bedside  No complaints  Patient is feeling better than she did yesterday      Tele Events:     Vitals:   Temp:  [97 9 °F (36 6 °C)-98 9 °F (37 2 °C)] (P) 98 4 °F (36 9 °C)  HR:  [82-97] (P) 86  Resp:  [16-20] (P) 16  BP: (130-147)/(60-76) (P) 143/64  Weight (last 2 days)     Date/Time   Weight    02/27/19 0600   105 (231 48)    02/26/19 0249   105 (231 48)    02/25/19 0600   105 (231 48)            Oxygen Therapy  SpO2: (P) 100 %  O2 Flow Rate (L/min): 2 L/min    IV Infusions:       Nutrition:        Diet Orders   (From admission, onward)            Start     Ordered    02/22/19 1308  Diet Regular; Regular House  Diet effective now     Question Answer Comment   Diet Type Regular    Regular Regular House    RD to adjust diet per protocol?  Yes        02/22/19 1307    02/22/19 1126  Room Service  Once     Question:  Type of Service  Answer:  Room Service - Appropriate with Assistance    02/22/19 1126          Ins/Outs:   I/O       02/25 0701 - 02/26 0700 02/26 0701 - 02/27 0700 02/27 0701 - 02/28 0700    Urine (mL/kg/hr) 2920 (1 2) 2900 (1 2) 900 (1 1)    Total Output 2920 2900 900    Net -2920 -2900 -900                 Lines/Drains:  Invasive Devices     Peripherally Inserted Central Catheter Line            PICC Line 02/22/19 4 days          Drain            External Urinary Catheter 1 day                 Active medications:  Scheduled Meds:  Current Facility-Administered Medications:  acetaminophen 650 mg Oral Q6H PRN Ami Sanchez PA-C   acetylcysteine 3 mL Nebulization TID Rajinder Sharma MD   benzonatate 100 mg Oral TID PRN Leesa Villarreal MD   celecoxib 200 mg Oral Daily Leesa Villarreal MD   diltiazem 240 mg Oral Daily Jennifer nKox PA-C   docusate sodium 100 mg Oral BID Jennifer Alvarenga DO   enoxaparin 40 mg Subcutaneous Daily Jennifer Knox PA-C   fluticasone-vilanterol 1 puff Inhalation Daily Rajinder Sharma MD   [START ON 2/28/2019] furosemide 20 mg Oral Daily Syed Medina MD   guaiFENesin 600 mg Oral Q12H [address] Rajinder Sharma MD   ipratropium 0 5 mg Nebulization TID Rajinder Sharma MD   levalbuterol 0 63 mg Nebulization Q4H PRN Leesa Villarreal MD   levalbuterol 0 63 mg Nebulization TID Rajinder Sharma MD   metoprolol succinate 25 mg Oral BID Jennifer Knox PA-C   nystatin  Topical 4x Daily Jennifer Knox PA-C   perflutren lipid microsphere 0 75 mL/min Intravenous Once in imaging Syed Medina MD predniSONE 40 mg Oral Daily Jennifer Alvarenga DO   silver sulfadiazine  Topical Daily Jennifer Knox PA-C     PRN Meds:  acetaminophen 650 mg Q6H PRN   benzonatate 100 mg TID PRN   levalbuterol 0 63 mg Q4H PRN   perflutren lipid microsphere 0 75 mL/min Once in imaging     ____________________________________________________________________      Physical Exam   Constitutional: She is oriented to person, place, and time  She appears well-developed and well-nourished  HENT:   Head: Normocephalic and atraumatic  Eyes: Pupils are equal, round, and reactive to light  Conjunctivae are normal    Neck: Normal range of motion  Neck supple  Cardiovascular: Normal rate, regular rhythm and normal heart sounds  Pulmonary/Chest: Effort normal  No respiratory distress  She has decreased breath sounds in the right lower field  She has no wheezes  She has no rhonchi  She has no rales  She exhibits no tenderness  Currently on 2 L NC    Abdominal: Soft  Bowel sounds are normal    Musculoskeletal: Normal range of motion  She exhibits no edema  Neurological: She is alert and oriented to person, place, and time  Skin: Skin is warm and dry     Psychiatric: She has a normal mood and affect            ____________________________________________________________________    Labs:   CBC: Results from last 7 days   Lab Units 02/27/19  0559 02/26/19  0533 02/25/19  0631   WBC Thousand/uL 10 35* 13 10* 7 23   HEMOGLOBIN g/dL 12 3 12 9 11 7   HEMATOCRIT % 41 7 43 2 39 4   MCV fL 90 89 89   PLATELETS Thousands/uL 258 272 248     CMP: Results from last 7 days   Lab Units 02/27/19  0559 02/26/19  0533 02/25/19  0631  02/22/19  0539 02/22/19  0037  02/21/19  2323   POTASSIUM mmol/L 5 1 4 8 4 7   < > 4 5 4 6   < >  --    CHLORIDE mmol/L 93* 95* 99*   < > 93* 92*   < >  --    CO2 mmol/L 43* 43* 39*   < > 36* 38*   < >  --    CO2, I-STAT mmol/L  --   --   --   --   --   --   --  39*   BUN mg/dL 24 27* 28*   < > 10 11   < >  -- CREATININE mg/dL 0 37* 0 46* 0 50*   < > 0 27* 0 25*   < >  --    GLUCOSE, ISTAT mg/dl  --   --   --   --   --   --   --  121   CALCIUM mg/dL 8 7 8 9 8 7   < > 8 8 8 8   < >  --    AST U/L  --  68*  --   --  13 16  --   --    ALT U/L  --  184*  --   --  23 22  --   --    ALK PHOS U/L  --  87  --   --  104 108   < >  --    EGFR ml/min/1 73sq m 96 90 87   < > 107 110   < >  --     < > = values in this interval not displayed  No components found for: ABG    Magnesium:   Results from last 7 days   Lab Units 02/27/19  0559   MAGNESIUM mg/dL 2 1     Phosphorous:   Results from last 7 days   Lab Units 02/27/19  0559   PHOSPHORUS mg/dL 3 1     Troponin:   Results from last 7 days   Lab Units 02/22/19  0037   TROPONIN I ng/mL <0 02     PT/INR:   Results from last 7 days   Lab Units 02/22/19  0037   PTT seconds 28   INR  1 01     Lactic Acid:     BNP:   Results from last 7 days   Lab Units 02/26/19  0533 02/22/19  0037   NT-PRO BNP pg/mL 1,996* 873*     TSH:     Procalcitonin: Invalid input(s): PROCALCITONIN      Imaging:   CT chest wo contrast   Final Result by Tanya Guillory MD (02/26 7945)         1  Right upper lobe infiltrate and left basilar consolidation concerning for multifocal pneumonia  2   Chronic right lower lobe and basilar infiltrates, perhaps fibrosis due to chronic aspiration with trace pleural effusion  3   Small left pleural effusion  4   Evidence of remote granulomatous disease  Workstation performed: [id number]         XR chest portable   Final Result by Juju Talley MD (02/25 8773)      Moderate to severe cardiomegaly with mild pulmonary vascular congestion with small right pleural effusion suggesting mild to moderate CHF  Right lower lobe lung opacity could relate to atelectasis and/or pneumonia  No pneumothorax  CT chest with contrast recommended for further evaluation                 Workstation performed: [id number]         IR PICC line   Final Result by Richard Betancur DO (02/22 1732)   Successful placement of right arm midline catheter  The catheter is ready for use       _______________________________________________________________   PROCEDURE DETAILS:    Operators: Dr Sydni Manriquez, [address] [place] [address] attending, performed the procedure  Anesthesia: 1% lidocaine was injected in the skin and subcutaneous tissues overlying the access site  Medications: 1% lidocaine   Contrast: 0 mL of Omnipaque 300   Fluoroscopy time: 0 minutes   Images: 2      COMMENTS:   Following the discussion of the risks, benefits and alternatives to the procedure, written informed consent was obtained from the patient and her daughter  The procedure was done at the bedside in the ICU  The right arm veins were evaluated as a    potential access site with ultrasound  The site was prepped and draped in the usual sterile fashion  All elements of maximal sterile barrier technique, cap and mask and sterile gown and sterile gloves and sterile full-body drape and hand hygiene and 2%    chlorhexidine for cutaneous antisepsis  Sterile ultrasound technique with sterile gel and sterile probe covers was also utilized  A preprocedure timeout was performed per [facility] protocol  Lidocaine was administered to the skin and a small skin incision was made  Under ultrasound guidance, the right basilic vein was punctured using a micropuncture set  Static images of real time needle entry into the vessel were obtained  A peel-away    sheath was then placed over a guidewire  A double lumen power catheter measuring   5 cm in length was then placed through the peel-away sheath  The peel-away sheath and guidewire were then removed  The catheter was aspirated and then flushed with    saline  The catheter was secured to the skin and a sterile dressing was applied                 Workstation performed: [id number]         XR chest 1 view portable   Final Result by Alexander Queen MD (02/22 0848)   Stable cardiomegaly without acute pulmonary disease  Workstation performed: [id number]               Micro: Lab Results   Component Value Date    BLOODCX No Growth After 5 Days  04/26/2018    BLOODCX No Growth After 5 Days  04/26/2018    BLOODCX No Growth After 5 Days  01/27/2016    BLOODCX No Growth After 5 Days  01/27/2016    URINECX >100,000 cfu/ml Escherichia coli (A) 04/26/2018    URINECX Culture results to follow   07/28/2016    URINECX >100,000 cfu/ml Escherichia coli 07/28/2016    URINECX 40,000-49,000 cfu/ml Klebsiella pneumoniae 07/28/2016    SPUTUMCULTUR 3+ Growth of Mixed Respiratory Antonella 01/31/2016    MRSACULTURE  02/22/2019     No Methicillin Resistant Staphlyococcus aureus (MRSA) isolated      Results from last 7 days   Lab Units 02/22/19  0229   INFLUENZA B PCR  Not Detected   RSV PCR  Not Detected           Code Status: Level 3 - DNAR and DNI

## 2019-02-27 NOTE — ASSESSMENT & PLAN NOTE
· Continue treatment with Breo and Atrovent   · Continue O2 supplementation, currently on 3L NC   · Patient completed course of azithromycin  · No need for antibiotic therapy at this time  · Cough assist device as prescribed by pulmonology

## 2019-02-27 NOTE — PROGRESS NOTES
[facility] Practice Progress Note - Kwan Gonzalez 80 y o  female MRN: [id number]    Unit/Bed#: 2 [address] 202 Encounter: [id number]      Assessment/Plan:    * Acute on chronic respiratory failure with hypoxia and hypercapnia ([address] )  Assessment & Plan  H/o chronic respiratory failure 2/2 muscular dystrophy vs obesity hypoventilation syndrome presents with SOB & hypoxia likely due to bronchitis  · Chest CT-  Right upper lobe infiltrate and left basilar consolidation concerning for multifocal pneumonia  Chronic right lower lobe and basilar infiltrates, perhaps fibrosis due to chronic aspiration with trace pleural effusion  Small left pleural effusion  Evidence of remote granulomatous disease  · Continue home BiPAP setting of 10/5 tonight   · Chest XR 2/25/19- Moderate to severe cardiomegaly with mild pulmonary vascular congestion with small right pleural effusion suggesting mild to moderate CHF   Right lower lobe lung opacity possibly related to atelectasis and/or pneumonia    · BNP 1996  · Continue Lasix 40mg PO Daily   · Procalcitonin negative   · Currently on 4L O2 supplementation via NC    · WBC 10 35 this AM, otherwise labs stable   · Vitals stable  · Pt completed 4 days of Azithromycin   · Continue Solumedrol 20mg BID, will continue to titrate as necessary   · Continue mucomyst with Xopenex   · Chest PT protocol started   · Influenza/RSV PCR, & urine strep/legionella negative   · Pulmonology consult placed and following, recommendations appreciated   · Continue to monitor closely     Essential hypertension  Assessment & Plan  · Stable   · 130/60 this AM   · ECHO 2/26/19: EF 55-60%  · As per Cardiology consult recommendations, will continue current medication regimen  · Continue Lasix 40mg PO Daily, Toprol XL 25mg PO BID & Cardizem 240mg PO qd  · Continue to monitor vitals q routine shift       Muscular dystrophy, 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  · Pt has been bed bound for > 20 years  · Encourage moving to chair throughout the day   · Patient will be discharged to home, family refusing outpatient rehabilitation    Acute on chronic diastolic heart failure [facility])  Assessment & Plan  · Cardiac echo 2/26/19- EF 55-60%   · Chest CT 2/26/19- Right upper lobe infiltrate and left basilar consolidation concerning for multifocal pneumonia  Chronic right lower lobe and basilar infiltrates, perhaps fibrosis due to chronic aspiration with trace pleural effusion  Small left pleural effusion  Evidence of remote granulomatous disease  · CXR 2/25- moderate to severe cardiomegaly with mild pulmonary vascular congestion with small right pleural effusion suggesting mild-to-moderate CHF  Right lower lobe lung opacity possibly related to atelectasis and/or pneumonia    · Will continue Lasix 40mg PO Daily   · Toprol XL 25mg BID, & Cardizem 240mg PO qd  · Cards recommendations appreciated     PORTIA treated with BiPAP  Assessment & Plan  · Chronic,stable   · Continue BiPAP overnight, on home setting of 10/5 - pt tolerated well    Hyponatremia  Assessment & Plan  · Resolved   · Na+ stable at 137   · Continue to monitor daily BMP    Generalized osteoarthritis  Assessment & Plan  · Stable   · Continue Tylenol 650mg Q6H prn    Morbid obesity ([address] )  Assessment & Plan  · Pt has limited activity due to MD   · Consult to Nutrition services placed   · Advise weight loss     Elevated liver enzymes  Assessment & Plan  · Likely due to infectious process   · AST 68  on 2/26  · Will continue to monitor with daily CMP    Acute bronchitis  Assessment & Plan  Continue treatment with Breo and Atrovent   Continue O2 supplementation, currently on 3L NC       Subjective:   Patient seen and examined at bedside  Patient states that she has breathing better today  Continues to complain of constipation, has not had a bowel movement in about a week  At home she usually goes at least every other day with no supplementation   She denies chest pain, abdominal pain or urinary symptoms  Objective:     Vitals: Blood pressure 130/60, pulse 93, temperature 97 9 °F (36 6 °C), temperature source Oral, resp  rate 16, height 5' 7" (1 702 m), weight 105 kg (231 lb 7 7 oz), SpO2 93 %, not currently breastfeeding  ,Body mass index is 36 26 kg/m²    Wt Readings from Last 3 Encounters:   02/27/19 105 kg (231 lb 7 7 oz)   04/26/18 125 kg (275 lb)   07/29/16 89 kg (196 lb 3 4 oz)       Intake/Output Summary (Last 24 hours) at 2/27/2019 1038  Last data filed at 2/27/2019 0603  Gross per 24 hour   Intake    Output 2900 ml   Net -2900 ml       Physical Exam: General appearance: alert  Lungs: diminished breath sounds and and rales b/l   Heart: regular rate and rhythm  Abdomen: Obese, soft, nontender  Extremities: Mild edema bilateral     Recent Results (from the past 24 hour(s))   Procalcitonin    Collection Time: 02/27/19  5:59 AM   Result Value Ref Range    Procalcitonin <0 05 <=0 25 ng/ml   Magnesium    Collection Time: 02/27/19  5:59 AM   Result Value Ref Range    Magnesium 2 1 1 6 - 2 6 mg/dL   Phosphorus    Collection Time: 02/27/19  5:59 AM   Result Value Ref Range    Phosphorus 3 1 2 3 - 4 1 mg/dL   CBC and differential    Collection Time: 02/27/19  5:59 AM   Result Value Ref Range    WBC 10 35 (H) 4 31 - 10 16 Thousand/uL    RBC 4 65 3 81 - 5 12 Million/uL    Hemoglobin 12 3 11 5 - 15 4 g/dL    Hematocrit 41 7 34 8 - 46 1 %    MCV 90 82 - 98 fL    MCH 26 5 (L) 26 8 - 34 3 pg    MCHC 29 5 (L) 31 4 - 37 4 g/dL    RDW 14 5 11 6 - 15 1 %    MPV 8 7 (L) 8 9 - 12 7 fL    Platelets 443 267 - 774 Thousands/uL    nRBC 0 /100 WBCs    Neutrophils Relative 88 (H) 43 - 75 %    Immat GRANS % 2 0 - 2 %    Lymphocytes Relative 5 (L) 14 - 44 %    Monocytes Relative 5 4 - 12 %    Eosinophils Relative 0 0 - 6 %    Basophils Relative 0 0 - 1 %    Neutrophils Absolute 9 08 (H) 1 85 - 7 62 Thousands/µL    Immature Grans Absolute 0 25 (H) 0 00 - 0 20 Thousand/uL    Lymphocytes Absolute 0 51 (L) 0 60 - 4 47 Thousands/µL    Monocytes Absolute 0 48 0 17 - 1 22 Thousand/µL    Eosinophils Absolute 0 00 0 00 - 0 61 Thousand/µL    Basophils Absolute 0 03 0 00 - 0 10 Thousands/µL   Basic metabolic panel    Collection Time: 02/27/19  5:59 AM   Result Value Ref Range    Sodium 137 136 - 145 mmol/L    Potassium 5 1 3 5 - 5 3 mmol/L    Chloride 93 (L) 100 - 108 mmol/L    CO2 43 (H) 21 - 32 mmol/L    ANION GAP 1 (L) 4 - 13 mmol/L    BUN 24 5 - 25 mg/dL    Creatinine 0 37 (L) 0 60 - 1 30 mg/dL    Glucose 139 65 - 140 mg/dL    Calcium 8 7 8 3 - 10 1 mg/dL    eGFR 96 ml/min/1 73sq m       Current Facility-Administered Medications   Medication Dose Route Frequency Provider Last Rate Last Dose    acetaminophen (TYLENOL) tablet 650 mg  650 mg Oral Q6H PRN Keyonna Draper PA-C   650 mg at 02/22/19 1803    acetylcysteine (MUCOMYST) 200 mg/mL inhalation solution 600 mg  3 mL Nebulization TID Osmar Moise MD   600 mg at 02/27/19 0724    benzonatate (TESSALON PERLES) capsule 100 mg  100 mg Oral TID PRN Sandra Sanchez MD        celecoxib (CeleBREX) capsule 200 mg  200 mg Oral Daily Sandra Sanchez MD   200 mg at 02/27/19 4727    diltiazem (CARDIZEM CD) 24 hr capsule 240 mg  240 mg Oral Daily Jennifer Knox PA-C   240 mg at 02/27/19 2196    docusate sodium (COLACE) capsule 100 mg  100 mg Oral BID Jennifer Alvarenga DO   100 mg at 02/27/19 9027    enoxaparin (LOVENOX) subcutaneous injection 40 mg  40 mg Subcutaneous Daily Jennifer Knox PA-C   40 mg at 02/27/19 0907    fluticasone-vilanterol (BREO ELLIPTA) 100-25 mcg/inh inhaler 1 puff  1 puff Inhalation Daily Osmar Moise MD   1 puff at 02/27/19 0906    furosemide (LASIX) tablet 40 mg  40 mg Oral Daily Jennifer Alvarenga DO   40 mg at 02/27/19 0914    guaiFENesin (MUCINEX) 12 hr tablet 600 mg  600 mg Oral Q12H [facility] Osmar Moise MD   600 mg at 02/27/19 0907    ipratropium (ATROVENT) 0 02 % inhalation solution 0 5 mg  0 5 mg Nebulization TID Víctor Avalos MD   0 5 mg at 02/27/19 0723    levalbuterol [facility]) inhalation solution 0 63 mg  0 63 mg Nebulization Q4H PRN Estelle Hanley MD   0 63 mg at 02/24/19 1150    levalbuterol (XOPENEX) inhalation solution 0 63 mg  0 63 mg Nebulization TID Víctor Avalos MD   0 63 mg at 02/27/19 0723    methylPREDNISolone sodium succinate (Solu-MEDROL) injection 20 mg  20 mg Intravenous Q12H [address] Víctor Avalos MD   20 mg at 02/27/19 0908    metoprolol succinate (TOPROL-XL) 24 hr tablet 25 mg  25 mg Oral BID Ofilia Harada, PA-C   25 mg at 02/27/19 0278    nystatin (MYCOSTATIN) powder   Topical 4x Daily Jennifer Knox PA-C        perflutren lipid microsphere (DEFINITY) injection  0 75 mL/min Intravenous Once in imaging Radha Vazquez MD        silver sulfadiazine (SILVADENE,SSD) 1 % cream   Topical Daily Ofilia Harada, PA-C           Invasive Devices     Peripherally Inserted Central Catheter Line            PICC Line 02/22/19 4 days          Drain            External Urinary Catheter 1 day                Lab, Imaging and other studies: I have personally reviewed pertinent reports      VTE Pharmacologic Prophylaxis: Enoxaparin (Lovenox)  VTE Mechanical Prophylaxis: sequential compression device    Jennifer Alvarenga DO

## 2019-02-27 NOTE — PLAN OF CARE
Problem: RESPIRATORY - ADULT  Goal: Achieves optimal ventilation and oxygenation  Description  INTERVENTIONS:  - Assess for changes in respiratory status  - Assess for changes in mentation and behavior  - Position to facilitate oxygenation and minimize respiratory effort  - Oxygen administration by appropriate delivery method based on oxygen saturation (per order) or ABGs  - Encourage broncho-pulmonary hygiene including cough, deep breathe, Incentive Spirometry  - Assess the need for suctioning and aspirate as needed  - Assess and instruct to report SOB or any respiratory difficulty  - Respiratory Therapy support as indicated   - Continue with bipap for hours of sleep  - Continue with nebulizer treatments  - Continue with airway clearance     Outcome: Progressing     Problem: PAIN - ADULT  Goal: Verbalizes/displays adequate comfort level or baseline comfort level  Description  Interventions:  - Encourage patient to monitor pain and request assistance  - Assess pain using appropriate pain scale  - Administer analgesics based on type and severity of pain and evaluate response  - Implement non-pharmacological measures as appropriate and evaluate response  - Notify physician/advanced practitioner if interventions unsuccessful or patient reports new pain   Outcome: Progressing     Problem: INFECTION - ADULT  Goal: Absence or prevention of progression during hospitalization  Description  INTERVENTIONS:  - Assess and monitor for signs and symptoms of infection  - Monitor lab/diagnostic results  - Monitor all insertion sites, i e  indwelling lines, tubes, and drains  - Monitor nasal secretions for changes in amount and color  - [place] appropriate cooling/warming therapies per order  - Administer medications as ordered  - Instruct and encourage patient and family to use good hand hygiene technique  - Identify and instruct in appropriate isolation precautions for identified infection/condition   Outcome: Progressing Problem: SAFETY ADULT  Goal: Maintain or return to baseline ADL function  Description  INTERVENTIONS:  -  Assess patient's ability to carry out ADLs; assess patient's baseline for ADL function and identify physical deficits which impact ability to perform ADLs (bathing, care of mouth/teeth, toileting, grooming, dressing, etc )  - Assess/evaluate cause of self-care deficits   - Assess range of motion  - Assess patient's mobility; develop plan if impaired  - Assess patient's need for assistive devices and provide as appropriate  - Encourage maximum independence but intervene and supervise when necessary  ¯ Involve family in performance of ADLs  ¯ Assess for home care needs following discharge   ¯ Request OT consult to assist with ADL evaluation and planning for discharge  ¯ Provide patient education as appropriate  Outcome: Progressing  Goal: Maintain or return mobility status to optimal level  Description  INTERVENTIONS:  - Assess patient's baseline mobility status (ambulation, transfers, stairs, etc )    - Identify cognitive and physical deficits and behaviors that affect mobility  - Identify mobility aids required to assist with transfers and/or ambulation (gait belt, sit-to-stand, lift, walker, cane, etc )  - [place] fall precautions as indicated by assessment  - Instruct patient to call for assistance with activity based on assessment  - Request Rehabilitation consult to assist with strengthening/weightbearing, etc    Outcome: Progressing     Problem: DISCHARGE PLANNING  Goal: Discharge to home or other facility with appropriate resources  Description  INTERVENTIONS:  - Identify barriers to discharge w/patient and caregiver  - Arrange for needed discharge resources and transportation as appropriate  - Identify discharge learning needs (meds, wound care, etc )  - Refer to Case Management Department for coordinating discharge planning if the patient needs post-hospital services based on physician/advanced practitioner order or complex needs related to functional status, cognitive ability, or social support system   Outcome: Progressing     Problem: Knowledge Deficit  Goal: Patient/family/caregiver demonstrates understanding of disease process, treatment plan, medications, and discharge instructions  Description  Complete learning assessment and assess knowledge base    Interventions:  - Provide teaching at level of understanding  - Provide teaching via preferred learning methods  Outcome: Progressing     Problem: DISCHARGE PLANNING - CARE MANAGEMENT  Goal: Discharge to post-acute care or home with appropriate resources  Description  INTERVENTIONS:  - Conduct assessment to determine patient/family and health care team treatment goals, and need for post-acute services based on payer coverage, community resources, and patient preferences, and barriers to discharge  - Address psychosocial, clinical, and financial barriers to discharge as identified in assessment in conjunction with the patient/family and health care team  - Arrange appropriate level of post-acute services according to patient's   needs and preference and payer coverage in collaboration with the physician and health care team  - Communicate with and update the patient/family, physician, and health care team regarding progress on the discharge plan  - Arrange appropriate transportation to post-acute venues  Return to home with dtrs   Outcome: Progressing

## 2019-02-28 ENCOUNTER — EPISODE CHANGES (OUTPATIENT)
Dept: CASE MANAGEMENT | Facility: HOSPITAL | Age: 84
End: 2019-02-28

## 2019-02-28 VITALS
HEART RATE: 97 BPM | OXYGEN SATURATION: 95 % | WEIGHT: 227.07 LBS | HEIGHT: 67 IN | TEMPERATURE: 97.5 F | DIASTOLIC BLOOD PRESSURE: 66 MMHG | RESPIRATION RATE: 18 BRPM | SYSTOLIC BLOOD PRESSURE: 133 MMHG | BODY MASS INDEX: 35.64 KG/M2

## 2019-02-28 DIAGNOSIS — J20.9 COPD (CHRONIC OBSTRUCTIVE PULMONARY DISEASE) WITH ACUTE BRONCHITIS (HCC): Primary | ICD-10-CM

## 2019-02-28 DIAGNOSIS — J44.0 COPD (CHRONIC OBSTRUCTIVE PULMONARY DISEASE) WITH ACUTE BRONCHITIS (HCC): Primary | ICD-10-CM

## 2019-02-28 LAB
ALBUMIN SERPL BCP-MCNC: 2.7 G/DL (ref 3.5–5)
ALP SERPL-CCNC: 70 U/L (ref 46–116)
ALT SERPL W P-5'-P-CCNC: 83 U/L (ref 12–78)
ANION GAP SERPL CALCULATED.3IONS-SCNC: 0 MMOL/L (ref 4–13)
ANION GAP SERPL CALCULATED.3IONS-SCNC: 0 MMOL/L (ref 4–13)
AST SERPL W P-5'-P-CCNC: 14 U/L (ref 5–45)
BASOPHILS # BLD AUTO: 0.02 THOUSANDS/ΜL (ref 0–0.1)
BASOPHILS NFR BLD AUTO: 0 % (ref 0–1)
BILIRUB SERPL-MCNC: 0.3 MG/DL (ref 0.2–1)
BUN SERPL-MCNC: 25 MG/DL (ref 5–25)
BUN SERPL-MCNC: 26 MG/DL (ref 5–25)
CALCIUM SERPL-MCNC: 8.6 MG/DL (ref 8.3–10.1)
CALCIUM SERPL-MCNC: 8.8 MG/DL (ref 8.3–10.1)
CHLORIDE SERPL-SCNC: 93 MMOL/L (ref 100–108)
CHLORIDE SERPL-SCNC: 95 MMOL/L (ref 100–108)
CO2 SERPL-SCNC: 42 MMOL/L (ref 21–32)
CO2 SERPL-SCNC: 44 MMOL/L (ref 21–32)
CREAT SERPL-MCNC: 0.43 MG/DL (ref 0.6–1.3)
CREAT SERPL-MCNC: 0.47 MG/DL (ref 0.6–1.3)
EOSINOPHIL # BLD AUTO: 0 THOUSAND/ΜL (ref 0–0.61)
EOSINOPHIL NFR BLD AUTO: 0 % (ref 0–6)
ERYTHROCYTE [DISTWIDTH] IN BLOOD BY AUTOMATED COUNT: 14.4 % (ref 11.6–15.1)
GFR SERPL CREATININE-BSD FRML MDRD: 89 ML/MIN/1.73SQ M
GFR SERPL CREATININE-BSD FRML MDRD: 92 ML/MIN/1.73SQ M
GLUCOSE SERPL-MCNC: 152 MG/DL (ref 65–140)
GLUCOSE SERPL-MCNC: 155 MG/DL (ref 65–140)
HCT VFR BLD AUTO: 41.7 % (ref 34.8–46.1)
HGB BLD-MCNC: 12.4 G/DL (ref 11.5–15.4)
IMM GRANULOCYTES # BLD AUTO: 0.24 THOUSAND/UL (ref 0–0.2)
IMM GRANULOCYTES NFR BLD AUTO: 2 % (ref 0–2)
LYMPHOCYTES # BLD AUTO: 0.54 THOUSANDS/ΜL (ref 0.6–4.47)
LYMPHOCYTES NFR BLD AUTO: 5 % (ref 14–44)
MAGNESIUM SERPL-MCNC: 2.1 MG/DL (ref 1.6–2.6)
MCH RBC QN AUTO: 26.4 PG (ref 26.8–34.3)
MCHC RBC AUTO-ENTMCNC: 29.7 G/DL (ref 31.4–37.4)
MCV RBC AUTO: 89 FL (ref 82–98)
MONOCYTES # BLD AUTO: 0.37 THOUSAND/ΜL (ref 0.17–1.22)
MONOCYTES NFR BLD AUTO: 3 % (ref 4–12)
NEUTROPHILS # BLD AUTO: 9.81 THOUSANDS/ΜL (ref 1.85–7.62)
NEUTS SEG NFR BLD AUTO: 90 % (ref 43–75)
NRBC BLD AUTO-RTO: 0 /100 WBCS
NT-PROBNP SERPL-MCNC: 701 PG/ML
PHOSPHATE SERPL-MCNC: 3.1 MG/DL (ref 2.3–4.1)
PLATELET # BLD AUTO: 284 THOUSANDS/UL (ref 149–390)
PMV BLD AUTO: 9 FL (ref 8.9–12.7)
POTASSIUM SERPL-SCNC: 4.8 MMOL/L (ref 3.5–5.3)
POTASSIUM SERPL-SCNC: 4.8 MMOL/L (ref 3.5–5.3)
PROT SERPL-MCNC: 6 G/DL (ref 6.4–8.2)
RBC # BLD AUTO: 4.7 MILLION/UL (ref 3.81–5.12)
SODIUM SERPL-SCNC: 137 MMOL/L (ref 136–145)
SODIUM SERPL-SCNC: 137 MMOL/L (ref 136–145)
WBC # BLD AUTO: 10.98 THOUSAND/UL (ref 4.31–10.16)

## 2019-02-28 PROCEDURE — 94668 MNPJ CHEST WALL SBSQ: CPT

## 2019-02-28 PROCEDURE — 83880 ASSAY OF NATRIURETIC PEPTIDE: CPT | Performed by: INTERNAL MEDICINE

## 2019-02-28 PROCEDURE — 99232 SBSQ HOSP IP/OBS MODERATE 35: CPT | Performed by: INTERNAL MEDICINE

## 2019-02-28 PROCEDURE — 99238 HOSP IP/OBS DSCHRG MGMT 30/<: CPT | Performed by: FAMILY MEDICINE

## 2019-02-28 PROCEDURE — 85025 COMPLETE CBC W/AUTO DIFF WBC: CPT | Performed by: STUDENT IN AN ORGANIZED HEALTH CARE EDUCATION/TRAINING PROGRAM

## 2019-02-28 PROCEDURE — 94640 AIRWAY INHALATION TREATMENT: CPT

## 2019-02-28 PROCEDURE — 80053 COMPREHEN METABOLIC PANEL: CPT | Performed by: GENERAL PRACTICE

## 2019-02-28 PROCEDURE — 83735 ASSAY OF MAGNESIUM: CPT | Performed by: STUDENT IN AN ORGANIZED HEALTH CARE EDUCATION/TRAINING PROGRAM

## 2019-02-28 PROCEDURE — 94760 N-INVAS EAR/PLS OXIMETRY 1: CPT

## 2019-02-28 PROCEDURE — 84100 ASSAY OF PHOSPHORUS: CPT | Performed by: STUDENT IN AN ORGANIZED HEALTH CARE EDUCATION/TRAINING PROGRAM

## 2019-02-28 PROCEDURE — 99232 SBSQ HOSP IP/OBS MODERATE 35: CPT | Performed by: PHYSICIAN ASSISTANT

## 2019-02-28 PROCEDURE — 80048 BASIC METABOLIC PNL TOTAL CA: CPT | Performed by: INTERNAL MEDICINE

## 2019-02-28 RX ORDER — PREDNISONE 20 MG/1
TABLET ORAL
Qty: 16 TABLET | Refills: 0 | Status: SHIPPED | OUTPATIENT
Start: 2019-02-28 | End: 2019-03-12

## 2019-02-28 RX ORDER — ALBUTEROL SULFATE 0.63 MG/3ML
1 SOLUTION RESPIRATORY (INHALATION) EVERY 6 HOURS PRN
Qty: 30 ML | Refills: 3 | Status: SHIPPED | OUTPATIENT
Start: 2019-02-28 | End: 2020-01-25 | Stop reason: HOSPADM

## 2019-02-28 RX ORDER — GUAIFENESIN 600 MG
600 TABLET, EXTENDED RELEASE 12 HR ORAL EVERY 12 HOURS SCHEDULED
Qty: 60 TABLET | Refills: 3 | Status: SHIPPED | OUTPATIENT
Start: 2019-02-28 | End: 2019-03-30

## 2019-02-28 RX ORDER — FLUTICASONE FUROATE AND VILANTEROL 100; 25 UG/1; UG/1
1 POWDER RESPIRATORY (INHALATION) DAILY
Qty: 1 INHALER | Refills: 0 | Status: SHIPPED | OUTPATIENT
Start: 2019-03-01 | End: 2021-10-28 | Stop reason: HOSPADM

## 2019-02-28 RX ADMIN — NYSTATIN: 100000 POWDER TOPICAL at 09:18

## 2019-02-28 RX ADMIN — DOCUSATE SODIUM 100 MG: 100 CAPSULE, LIQUID FILLED ORAL at 09:17

## 2019-02-28 RX ADMIN — SILVER SULFADIAZINE: 10 CREAM TOPICAL at 09:18

## 2019-02-28 RX ADMIN — ENOXAPARIN SODIUM 40 MG: 40 INJECTION SUBCUTANEOUS at 09:17

## 2019-02-28 RX ADMIN — GUAIFENESIN 600 MG: 600 TABLET, EXTENDED RELEASE ORAL at 09:17

## 2019-02-28 RX ADMIN — METOPROLOL SUCCINATE 25 MG: 25 TABLET, EXTENDED RELEASE ORAL at 09:17

## 2019-02-28 RX ADMIN — FUROSEMIDE 20 MG: 20 TABLET ORAL at 09:17

## 2019-02-28 RX ADMIN — DILTIAZEM HYDROCHLORIDE 240 MG: 240 CAPSULE, COATED, EXTENDED RELEASE ORAL at 09:17

## 2019-02-28 RX ADMIN — PREDNISONE 40 MG: 20 TABLET ORAL at 09:17

## 2019-02-28 RX ADMIN — LEVALBUTEROL HYDROCHLORIDE 0.63 MG: 0.63 SOLUTION RESPIRATORY (INHALATION) at 07:31

## 2019-02-28 RX ADMIN — CELECOXIB 200 MG: 100 CAPSULE ORAL at 09:17

## 2019-02-28 RX ADMIN — FLUTICASONE FUROATE AND VILANTEROL TRIFENATATE 1 PUFF: 100; 25 POWDER RESPIRATORY (INHALATION) at 09:18

## 2019-02-28 RX ADMIN — ACETYLCYSTEINE 3 ML: 200 SOLUTION ORAL; RESPIRATORY (INHALATION) at 07:30

## 2019-02-28 RX ADMIN — IPRATROPIUM BROMIDE 0.5 MG: 0.5 SOLUTION RESPIRATORY (INHALATION) at 07:30

## 2019-02-28 NOTE — PROGRESS NOTES
Progress Note - Cardiology   [facility]     Neha Figueroa 80 y o  female MRN: [id number]  : 8/10/1931  Unit/Bed#: 2 [place] [address] Encounter: [id number]    Assessment and Plan:   1  Hypercarbic respiratory failure with chronic hypercarbia  She uses BiPAP now regularly      2  History of obstructive sleep apnea on BiPAP  Advised to be compliant with BiPAP machine  She is tolerating it very well     3  Acute bronchitis versus pneumonia  Management as per Pulmonary  Procalcitonin levels are negative  No antibiotics  She seems to have recovered very well      4  Muscular dystrophy limb girdle type  Patient is mostly bed-bound  Continue supportive care     5  RBBB which is chronic and not changed     6  History of tachycardia and at least moderate to severe pulmonary hypertension  As patient has infection and fluid status improved heart rate also better  Continue Cardizem and low-dose metoprolol      7  Essential hypertension  blood pressure is pretty well controlled  Will decrease Lasix to 20 mg daily  Continue Cardizem and metoprolol  She continued responded well to p o  Lasix       8  Chronic diastolic heart failure  Elevated BNP most likely due to RV strain and evidence of fluid overload  She has diet is 12 repeat NT BNP at the request of family monitor input output and electrolytes  Continue current Rx  Subjective / Objective:   Patient seen and evaluated  She is feeling much better  Labs are also improved  NT BNP now around 700  No chest pain  Cough has improved  Vitals: Blood pressure 133/66, pulse 97, temperature 97 5 °F (36 4 °C), temperature source Temporal, resp  rate 18, height 5' 7" (1 702 m), weight 103 kg (227 lb 1 2 oz), SpO2 95 %, not currently breastfeeding  Vitals:    19 0600 19 0551   Weight: 105 kg (231 lb 7 7 oz) 103 kg (227 lb 1 2 oz)     Body mass index is 35 56 kg/m²    BP Readings from Last 3 Encounters:   19 133/66   18 146/65   02/07/18 120/80     Orthostatic Blood Pressures      Most Recent Value   Blood Pressure  133/66 filed at 02/28/2019 0911   Patient Position - Orthostatic VS  Lying filed at 02/28/2019 0911        I/O       02/24 0701 - 02/25 0700 02/25 0701 - 02/26 0700 02/26 0701 - 02/27 0700    P  O        I V  (mL/kg)       Total Intake(mL/kg)       Urine (mL/kg/hr) 1250 (0 5) 2920 (1 2)     Total Output 1250 2920     Net -1250 -2920                Invasive Devices          None            Intake/Output Summary (Last 24 hours) at 2/28/2019 1409  Last data filed at 2/28/2019 1253  Gross per 24 hour   Intake 730 ml   Output 1850 ml   Net -1120 ml         Physical Exam:   Physical Exam    Alert awake rendered not any distress  She has neuromuscular disorder  Morbidly obese  Chest bilateral air entry clear to auscultation anteriorly  Cardiac  S1-S2 regular with a 2/6 ejection systolic murmur S4 present  Abdomen  Abdomen is benign obese bowel sounds are positive  Extremities  Extremities have trace edema with bounding distal pulses  She has no new focal neurological loss but she is mostly bed-bound due to her neuromuscular disorder        Medications/ Allergies:       Current Facility-Administered Medications:  acetaminophen 650 mg Oral Q6H PRN Dougie Ron PA-C   acetylcysteine 3 mL Nebulization TID Jermaine Alaniz MD   benzonatate 100 mg Oral TID PRN Britta Ruth MD   celecoxib 200 mg Oral Daily Britta Ruth MD   diltiazem 240 mg Oral Daily Jennifer Knox PA-C   docusate sodium 100 mg Oral BID Jennifer Alvarenga DO   enoxaparin 40 mg Subcutaneous Daily Jennifer Knox PA-C   fluticasone-vilanterol 1 puff Inhalation Daily Jermaine Alaniz MD   furosemide 20 mg Oral Daily Riki Banegas MD   guaiFENesin 600 mg Oral Q12H Natacha Sigala MD   ipratropium 0 5 mg Nebulization TID Jermaine Alaniz MD   levalbuterol 0 63 mg Nebulization Q4H PRN Britta Ruth MD   levalbuterol 0 63 mg Nebulization TID Martha Mccoy MD   metoprolol succinate 25 mg Oral BID Jennifer Knox PA-C   nystatin  Topical 4x Daily Jennifer Knox PA-C   perflutren lipid microsphere 0 75 mL/min Intravenous Once in imaging Nan Lopez MD   predniSONE 40 mg Oral Daily Jennifer Alvarenga DO   silver sulfadiazine  Topical Daily Jeninfer Knox PA-C       acetaminophen 650 mg Q6H PRN   benzonatate 100 mg TID PRN   levalbuterol 0 63 mg Q4H PRN   perflutren lipid microsphere 0 75 mL/min Once in imaging     No Known Allergies    VTE Pharmacologic Prophylaxis:   Lovenox    Labs:   Troponins:  Results from last 7 days   Lab Units 02/22/19  0037   TROPONIN I ng/mL <0 02       CBC with diff:  Results from last 7 days   Lab Units 02/28/19  0450 02/27/19  0559 02/26/19  0533 02/25/19  0631 02/24/19  0528 02/22/19  0539 02/21/19  2327   WBC Thousand/uL 10 98* 10 35* 13 10* 7 23 4 54 9 14 9 63   HEMOGLOBIN g/dL 12 4 12 3 12 9 11 7 11 3* 12 9 13 3   HEMATOCRIT % 41 7 41 7 43 2 39 4 36 9 43 2 42 7   MCV fL 89 90 89 89 87 89 85   PLATELETS Thousands/uL 284 258 272 248 241 210 213   MCH pg 26 4* 26 5* 26 7* 26 4* 26 7* 26 7* 26 6*   MCHC g/dL 29 7* 29 5* 29 9* 29 7* 30 6* 29 9* 31 1*   RDW % 14 4 14 5 14 6 15 0 14 9 14 6 18 4*   MPV fL 9 0 8 7* 9 0 8 9 8 8* 9 1 9 3   NRBC AUTO /100 WBCs 0 0 0 0 0 0 0       CMP:  Results from last 7 days   Lab Units 02/28/19  0450 02/27/19  0559 02/26/19  0533 02/25/19  0631 02/24/19  0528 02/22/19  0539 02/22/19  0037 02/21/19  2323   SODIUM mmol/L 137  137 137 137 137 137 132* 129*  --    POTASSIUM mmol/L 4 8  4 8 5 1 4 8 4 7 4 4 4 5 4 6  --    CHLORIDE mmol/L 95*  93* 93* 95* 99* 97* 93* 92*  --    CO2 mmol/L 42*  44* 43* 43* 39* 37* 36* 38*  --    CO2, I-STAT mmol/L  --   --   --   --   --   --   --  39*   ANION GAP mmol/L 0*  0* 1* -1* -1* 3* 3* -1*  --    BUN mg/dL 25  26* 24 27* 28* 28* 10 11  --    CREATININE mg/dL 0 43*  0 47* 0 37* 0 46* 0 50* 0 52* 0 27* 0 25*  --    GLUCOSE, ISTAT mg/dl  --   --   --   --   --   --   --  121   CALCIUM mg/dL 8 6  8 8 8 7 8 9 8 7 8 8 8 8 8 8  --    AST U/L 14  --  68*  --   --  13 16  --    ALT U/L 83*  --  184*  --   --  23 22  --    ALK PHOS U/L 70  --  87  --   --  104 108  --    TOTAL PROTEIN g/dL 6 0*  --  6 4  --   --  6 8 6 7  --    ALBUMIN g/dL 2 7*  --  2 8*  --   --  2 8* 2 7*  --    TOTAL BILIRUBIN mg/dL 0 30  --  0 30  --   --  0 20 0 30  --    EGFR ml/min/1 73sq m 92  89 96 90 87 86 107 110  --        Magnesium:  Results from last 7 days   Lab Units 02/28/19  0450 02/27/19  0559 02/26/19  0533 02/25/19  0631 02/24/19  0528 02/22/19  0539   MAGNESIUM mg/dL 2 1 2 1 2 1 2 0 1 9 1 8     Coags:  Results from last 7 days   Lab Units 02/22/19  0037   PTT seconds 28   INR  1 01     NT-proBNP:   Recent Labs     02/26/19  0533 02/28/19  0450   NTBNP 1,996* 701*        Imaging & Testing   I have personally reviewed pertinent reports  Xr Chest Portable    Result Date: 2/25/2019  Narrative: CHEST INDICATION: Shortness of breath, chest pain rule out pneumonia/ fluid overload  COMPARISON:  February 22, 2019 EXAM PERFORMED/VIEWS:  XR CHEST PORTABLE FINDINGS: Moderate to severe cardiomegaly with mild pulmonary vascular congestion with small right pleural effusion suggesting mild to moderate CHF  Right lower lobe lung opacity could relate to atelectasis and/or pneumonia  No pneumothorax  CT chest with contrast recommended for further evaluation  Impression: Moderate to severe cardiomegaly with mild pulmonary vascular congestion with small right pleural effusion suggesting mild to moderate CHF  Right lower lobe lung opacity could relate to atelectasis and/or pneumonia  No pneumothorax  CT chest with contrast recommended for further evaluation  Workstation performed: [id number]         EKG / Monitor: Personally reviewed  No arrhythmia    She has underlying RBBB    Cardiac testing:   Results for orders placed during the hospital encounter of 07/28/16 Echo complete with contrast if indicated    Narrative [address]  [address] [facility]  [phone]    Transthoracic Echocardiogram  Limited 2D, M-mode, Doppler, and Color Doppler    Study date:  2016    Patient: Brett Grissom  MR number: [id number]  Account number: [de-identified]  : 10-Aug-1931  Age: 80 years  Gender: Female  Status: Routine  Location: Echo lab  Height: 68 in  Weight: 339 2 lb  BP: 113/ 59 mmHg    Indications: Pulmonary HTN    Diagnoses: 785 2 - CARDIAC MURMURS NEC    Sonographer:  Rolo Pittman  Primary Physician:  Ed Dates, DO  Referring Physician:  Ed Dates, DO  Group:  [facility]  RN:  Juanis Galloway RN  Interpreting Physician:  Leon Conrad MD    SUMMARY    PROCEDURE INFORMATION:  This was a technically difficult study  LEFT VENTRICLE:  Systolic function was normal  Ejection fraction was estimated in the range of  60 % to 65 % to be 60 %  Although no diagnostic regional wall motion  abnormality was identified, this possibility cannot be completely excluded on  the basis of this study  Wall thickness was mildly increased  There was mild concentric hypertrophy  Features were consistent with a pseudonormal left ventricular filling pattern,  with concomitant abnormal relaxation and increased filling pressure (grade 2  diastolic dysfunction)  LEFT ATRIUM:  The atrium was mildly dilated  MITRAL VALVE:  There was mild annular calcification  There was mild regurgitation  TRICUSPID VALVE:  There was moderate regurgitation  Estimated peak PA pressure was 60 mmHg  HISTORY: PRIOR HISTORY: Muscular Dystrophy, Arthritis    PROCEDURE: The procedure was performed in the echo lab  This was a routine  study  The transthoracic approach was used  The study included limited 2D  imaging, M-mode, limited spectral Doppler, and color Doppler  The heart rate  was 67 bpm, at the start of the study  Images were not obtained from the  parasternal, subcostal, or suprasternal notch acoustic windows  Intravenous  contrast (Definity solution [1 3 ml Definity/8 7ml normal saline solution]) was  administered  Intravenous contrast ( 4 ml) was administered to opacify the left  ventricle  Echocardiographic views were limited due to restricted patient  mobility, poor acoustic window availability, and decreased penetration  This  was a technically difficult study  LEFT VENTRICLE: Size was normal  Systolic function was normal  Ejection  fraction was estimated in the range of 60 % to 65 % to be 60 %  Although no  diagnostic regional wall motion abnormality was identified, this possibility  cannot be completely excluded on the basis of this study  Wall thickness was  mildly increased  There was mild concentric hypertrophy  DOPPLER: Features were  consistent with a pseudonormal left ventricular filling pattern, with  concomitant abnormal relaxation and increased filling pressure (grade 2  diastolic dysfunction)  RIGHT VENTRICLE: The size was normal     LEFT ATRIUM: The atrium was mildly dilated  RIGHT ATRIUM: Size was at the upper limits of normal     MITRAL VALVE: There was mild annular calcification  DOPPLER: There was mild  regurgitation  AORTIC VALVE: DOPPLER: There was no evidence for stenosis  There was no  regurgitation  TRICUSPID VALVE: DOPPLER: There was moderate regurgitation  Estimated peak PA  pressure was 60 mmHg  PULMONIC VALVE: Not well visualized  PERICARDIUM: There was no pericardial effusion  AORTA: The root exhibited normal size  SYSTEM MEASUREMENT TABLES    Unspecified Scan Mode  MV Peak A Mayo: 654 mm/s  MV Peak E Mayo   Mean: 901 mm/s  MVA (PHT): 4 15 cm squared  PHT: 53 ms  Max P mm[Hg]  V Max: 3430 mm/s  Vmax: 3150 mm/s    [employer] Accredited Echocardiography Laboratory    Prepared and electronically signed by    Wilfredo Sepulveda MD  Signed 01-Aug-2016 10:33:45           Dr Ivette Arenas MD [facility]      "This note has been constructed using a voice recognition system  Therefore there may be syntax, spelling, and/or grammatical errors   Please call if you have any questions  "

## 2019-02-28 NOTE — PROGRESS NOTES
Progress Note - Pulmonary   Brent See 80 y o  female MRN: [id number]  Unit/Bed#: 2 [place] [address] Encounter: [id number]      Assessment/Plan:     51-year-old female with limb-girdle muscular dystrophy, chronic hypercarbic respiratory failure, nighttime hypoxemia 2 L dependent nasal cannula, morbid obesity with chronic limited function and is bed-bound/wheelchair-bound, PORTIA on BiPAP at home 10/5 initially presented with acute bronchitis  Treated with 5 days of and azithromycin  Ongoing atelectasis  We ordered cough assist for the patient into the outpatient setting  Family is very involved in this patient's care  She has 7 children who all pitch in to take care of her at home      Acute on chronic diastolic heart failure (HCC)  Assessment & Plan  -continues to diurese and is -1 5 L/24 hours  -on 2 L nasal cannula patient oxygen saturation is 97%  -patient can utilize up to 2 L nasal cannula oxygen at home to maintain oxygen saturation 92-94%  -CT of the chest consistent with small bibasilar pleural effusions and atelectasis  -clinically is not consistent with pneumonia  -continue diuresis per pleural effusions  -Cough assist was ordered for atelectasis > pending delivery from [employer]    PORTIA treated with BiPAP  Assessment & Plan  -patient utilizes BiPAP at home 10/5  -family typically bleeds 2 L cannula oxygen at night      Muscular dystrophy, limb girdle  Assessment & Plan  -patient has a history of muscular dystrophy  -I did order a cough assist for this patient  -case management following  -verify that this is pending delivery when patient gets home    Morbid obesity ([address] )  Assessment & Plan  -overall benefit of weight loss including benefit to improve respiratory status    * Acute on chronic respiratory failure with hypoxia and hypercapnia (HCC)  Assessment & Plan  -patient utilizing BiPAP at night at her home dose of 10/5  -her hypercapnia appears to be stable , acute portion as resolved  -hypoxemia is improving now requiring 2 liters/minute down from 3 L  -chest x-ray with asymmetric pulmonary edema with question of atelectasis versus pneumonia  -procalcitonin remains less than 0 05  -no fevers  -maintain BiPAP  -maintain incentive spirometry  -maintain cough assist    Acute bronchitisresolved as of 2/28/2019  Assessment & Plan  -oral steroid taper 40, 30, 20, 10 x 3 days each down to baseline of 0    Pulmonary will sign off  **  this patient does not routinely follow up in the office as she is bed-bound  ** patient gets home visits from Dr Ryan Morfin  ** we discussed that our offices available to her should she need any adjustments in her respiratory medications or modalities  **  of note this patient will never be a candidate for outpatient pulmonary function testing / any type of sleep testing would mandatorily need to be done at home      ______________________________________________________________________    Subjective: Pt seen and examined at bedside  No complaints  Patient feels ready to go home  Tele Events:     Vitals:   Temp:  [97 5 °F (36 4 °C)-98 4 °F (36 9 °C)] 97 5 °F (36 4 °C)  HR:  [84-97] 97  Resp:  [16-18] 18  BP: (120-143)/(58-66) 133/66  Weight (last 2 days)     Date/Time   Weight    02/28/19 0551   103 (227 07)    02/27/19 0600   105 (231 48)    02/26/19 0249   105 (231 48)            Oxygen Therapy  SpO2: 95 %  O2 Flow Rate (L/min): 2 L/min    IV Infusions:       Nutrition:        Diet Orders   (From admission, onward)            Start     Ordered    02/22/19 1308  Diet Regular; Regular House  Diet effective now     Question Answer Comment   Diet Type Regular    Regular Regular House    RD to adjust diet per protocol?  Yes        02/22/19 1307    02/22/19 1126  Room Service  Once     Question:  Type of Service  Answer:  Room Service - Appropriate with Assistance    02/22/19 1126          Ins/Outs:   I/O       02/26 0701 - 02/27 0700 02/27 0701 - 02/28 0700 02/28 0701 - 03/01 0700    P  O   360     Total Intake(mL/kg)  360 (3 5)     Urine (mL/kg/hr) 2900 (1 2) 1900 (0 8)     Total Output 2900 1900     Net -2900 -1540            Unmeasured Urine Occurrence  2 x           Lines/Drains:  Invasive Devices     Peripherally Inserted Central Catheter Line            PICC Line 02/22/19 5 days          Drain            External Urinary Catheter 2 days                 Active medications:  Scheduled Meds:  Current Facility-Administered Medications:  acetaminophen 650 mg Oral Q6H PRN Vipin Smith PA-C   acetylcysteine 3 mL Nebulization TID Jeremi Javier MD   benzonatate 100 mg Oral TID PRN Mookie Gottlieb MD   celecoxib 200 mg Oral Daily Mookie Gottlieb MD   diltiazem 240 mg Oral Daily Jennifer Knox PA-C   docusate sodium 100 mg Oral BID Jennifer Alvarenga,    enoxaparin 40 mg Subcutaneous Daily Jennifer Knox PA-C   fluticasone-vilanterol 1 puff Inhalation Daily Jeremi Javier MD   furosemide 20 mg Oral Daily Leopoldo Boas, MD   guaiFENesin 600 mg Oral Q12H [address] Jeremi Javier MD   ipratropium 0 5 mg Nebulization TID Jeremi Javier MD   levalbuterol 0 63 mg Nebulization Q4H PRN Mookie Gottlieb MD   levalbuterol 0 63 mg Nebulization TID Jeremi Javier MD   metoprolol succinate 25 mg Oral BID Jennifer Knox PA-C   nystatin  Topical 4x Daily Jennifer Knox PA-C   perflutren lipid microsphere 0 75 mL/min Intravenous Once in imaging Leopoldo Boas, MD   predniSONE 40 mg Oral Daily Jennifer Alvarenga,    silver sulfadiazine  Topical Daily Jennifer Knox PA-C     PRN Meds:  acetaminophen 650 mg Q6H PRN   benzonatate 100 mg TID PRN   levalbuterol 0 63 mg Q4H PRN   perflutren lipid microsphere 0 75 mL/min Once in imaging     ____________________________________________________________________      Physical Exam   Constitutional: She is oriented to person, place, and time  She appears well-developed and well-nourished     Morbidly obese body habitus    Bed bound   HENT:   Head: Normocephalic and atraumatic  Eyes: Pupils are equal, round, and reactive to light  Conjunctivae are normal    Neck: Normal range of motion  Neck supple  Cardiovascular: Normal rate, regular rhythm and normal heart sounds  Pulmonary/Chest: Effort normal  No respiratory distress  She has decreased breath sounds in the right lower field  She has no wheezes  She has rhonchi in the right lower field  She has no rales  She exhibits no tenderness  Occasional rhonchi right lower lung field clears with cough   Abdominal: Soft  Bowel sounds are normal    Musculoskeletal: Normal range of motion  She exhibits no edema  Chronic lower extremity erythema   Neurological: She is alert and oriented to person, place, and time  Skin: Skin is warm and dry     Psychiatric: She has a normal mood and affect            ____________________________________________________________________    Labs:   CBC: Results from last 7 days   Lab Units 02/28/19  0450 02/27/19  0559 02/26/19  0533   WBC Thousand/uL 10 98* 10 35* 13 10*   HEMOGLOBIN g/dL 12 4 12 3 12 9   HEMATOCRIT % 41 7 41 7 43 2   MCV fL 89 90 89   PLATELETS Thousands/uL 284 258 272     CMP: Results from last 7 days   Lab Units 02/28/19  0450 02/27/19  0559 02/26/19  0533  02/22/19  0539  02/21/19  2323   POTASSIUM mmol/L 4 8  4 8 5 1 4 8   < > 4 5   < >  --    CHLORIDE mmol/L 95*  93* 93* 95*   < > 93*   < >  --    CO2 mmol/L 42*  44* 43* 43*   < > 36*   < >  --    CO2, I-STAT mmol/L  --   --   --   --   --   --  39*   BUN mg/dL 25  26* 24 27*   < > 10   < >  --    CREATININE mg/dL 0 43*  0 47* 0 37* 0 46*   < > 0 27*   < >  --    GLUCOSE, ISTAT mg/dl  --   --   --   --   --   --  121   CALCIUM mg/dL 8 6  8 8 8 7 8 9   < > 8 8   < >  --    AST U/L 14  --  68*  --  13   < >  --    ALT U/L 83*  --  184*  --  23   < >  --    ALK PHOS U/L 70  --  87  --  104   < >  --    EGFR ml/min/1 73sq m 92  89 96 90   < > 107   < >  --     < > = values in this interval not displayed  No components found for: ABG    Magnesium:   Results from last 7 days   Lab Units 02/28/19  0450   MAGNESIUM mg/dL 2 1     Phosphorous:   Results from last 7 days   Lab Units 02/28/19  0450   PHOSPHORUS mg/dL 3 1     Troponin:   Results from last 7 days   Lab Units 02/22/19  0037   TROPONIN I ng/mL <0 02     PT/INR:   Results from last 7 days   Lab Units 02/22/19  0037   PTT seconds 28   INR  1 01     Lactic Acid:     BNP:   Results from last 7 days   Lab Units 02/28/19  0450 02/26/19  0533 02/22/19  0037   NT-PRO BNP pg/mL 701* 1,996* 873*     TSH:     Procalcitonin: Invalid input(s): PROCALCITONIN      Imaging:   CT chest wo contrast   Final Result by Vini Dave MD (02/26 1449)         1  Right upper lobe infiltrate and left basilar consolidation concerning for multifocal pneumonia  2   Chronic right lower lobe and basilar infiltrates, perhaps fibrosis due to chronic aspiration with trace pleural effusion  3   Small left pleural effusion  4   Evidence of remote granulomatous disease  Workstation performed: [id number]         XR chest portable   Final Result by Kathrin Han MD (02/25 2213)      Moderate to severe cardiomegaly with mild pulmonary vascular congestion with small right pleural effusion suggesting mild to moderate CHF  Right lower lobe lung opacity could relate to atelectasis and/or pneumonia  No pneumothorax  CT chest with contrast recommended for further evaluation  Workstation performed: [id number]         IR PICC line   Final Result by Epi Majano DO (02/22 9832)   Successful placement of right arm midline catheter  The catheter is ready for use       _______________________________________________________________   PROCEDURE DETAILS:    Operators: Dr Carleen Newberry, [address] [place] [address] attending, performed the procedure     Anesthesia: 1% lidocaine was injected in the skin and subcutaneous tissues overlying the access site    Medications: 1% lidocaine   Contrast: 0 mL of Omnipaque 300   Fluoroscopy time: 0 minutes   Images: 2      COMMENTS:   Following the discussion of the risks, benefits and alternatives to the procedure, written informed consent was obtained from the patient and her daughter  The procedure was done at the bedside in the ICU  The right arm veins were evaluated as a    potential access site with ultrasound  The site was prepped and draped in the usual sterile fashion  All elements of maximal sterile barrier technique, cap and mask and sterile gown and sterile gloves and sterile full-body drape and hand hygiene and 2%    chlorhexidine for cutaneous antisepsis  Sterile ultrasound technique with sterile gel and sterile probe covers was also utilized  A preprocedure timeout was performed per [facility] protocol  Lidocaine was administered to the skin and a small skin incision was made  Under ultrasound guidance, the right basilic vein was punctured using a micropuncture set  Static images of real time needle entry into the vessel were obtained  A peel-away    sheath was then placed over a guidewire  A double lumen power catheter measuring   5 cm in length was then placed through the peel-away sheath  The peel-away sheath and guidewire were then removed  The catheter was aspirated and then flushed with    saline  The catheter was secured to the skin and a sterile dressing was applied  Workstation performed: [id number]         XR chest 1 view portable   Final Result by Steff Hamilton MD (02/22 0848)   Stable cardiomegaly without acute pulmonary disease  Workstation performed: [id number]               Micro: Lab Results   Component Value Date    BLOODCX No Growth After 5 Days  04/26/2018    BLOODCX No Growth After 5 Days  04/26/2018    BLOODCX No Growth After 5 Days  01/27/2016    BLOODCX No Growth After 5 Days   01/27/2016    URINECX >100,000 cfu/ml Escherichia coli (A) 04/26/2018 URINECX Culture results to follow   07/28/2016    URINECX >100,000 cfu/ml Escherichia coli 07/28/2016    URINECX 40,000-49,000 cfu/ml Klebsiella pneumoniae 07/28/2016    SPUTUMCULTUR 3+ Growth of Mixed Respiratory Antonella 01/31/2016    MRSACULTURE  02/22/2019     No Methicillin Resistant Staphlyococcus aureus (MRSA) isolated      Results from last 7 days   Lab Units 02/22/19  0229   INFLUENZA B PCR  Not Detected   RSV PCR  Not Detected           Code Status: Level 3 - DNAR and DNI

## 2019-02-28 NOTE — PROGRESS NOTES
PICC Removal Note:  S:  80years old female need PICC line to be removed prior to discharge home  O: PICC line removed from right antecubital after sterile site prepped per protocol  PICC catheter tip visualized and intact  Pressure dressing applied with tape  A: No redness, ecchymosis, edema, swelling, or drainage noted at site  P: Instructions provided on post PICC discharge care, including followup notification instructions

## 2019-02-28 NOTE — SOCIAL WORK
Pt DC to home today, went via [place] transportation  Louis processing cough assist device and VNA updated on DC and AVS [facility]

## 2019-02-28 NOTE — DISCHARGE SUMMARY
[facility] Discharge Summary - Medical Lowgregg Cool 80 y o  female MRN: [id number]    [place] 45  Room / Bed: [address] Encounter: [id number]    BRIEF OVERVIEW  Admitting Provider: Lit Nguyen DO  Discharge Provider: Oren Pink DO    Discharge To:   Home      Outpatient Follow-Up:  Patient is immobile and will likely require home visits  Things to address at first follow up visit:  Respiratory status  Labs and results pending at discharge:  None    Admission Date: 2/21/2019     Discharge Date: 2/28/2019  1:50 PM    Primary Discharge Diagnosis  Principal Problem:    Acute on chronic respiratory failure with hypoxia and hypercapnia ([facility] [address] )  Active Problems:    Essential hypertension    Muscular dystrophy, limb girdle    Acute on chronic diastolic heart failure (HCC)    PORTIA treated with BiPAP    Hyponatremia    Generalized osteoarthritis    Morbid obesity ([facility] [address] )    Elevated liver enzymes  Resolved Problems:    Acute bronchitis      Consulting Providers   Pulmonology- Dr Moss Camuy  Cardiology- Dr Whitney Player    Procedures Performed/Pertinent Test results  Results for orders placed or performed during the hospital encounter of 02/21/19   Influenza A/B and RSV by PCR   Result Value Ref Range    INFLU A PCR Not Detected Not Detected    INFLU B PCR Not Detected Not Detected    RSV PCR Not Detected Not Detected   Strep Pneumoniae, Urine   Result Value Ref Range    Strep pneumoniae antigen, urine Negative Negative   Legionella antigen, urine   Result Value Ref Range    Legionella Urinary Antigen Negative Negative   MRSA culture   Result Value Ref Range    MRSA Culture Only       No Methicillin Resistant Staphlyococcus aureus (MRSA) isolated   Respiratory Pathogen Profile, PCR   Result Value Ref Range    INFLU A/MATRIX GENE Not Detected Not Detected    Influenza A/H1 Not Detected Not Detected    Influenza A/H3 Not Detected Not Detected    INFLUENZA B Not Detected Not Detected    RSV A Not Detected Not Detected    RSV B Not Detected Not Detected    Coronavirus 229E Not Detected Not Detected    Coronavirus OC43 Not Detected Not Detected    Coronavirus NL63 Not Detected Not Detected    Coronavirus HKU1 Not Detected Not Detected    METAPNEUMOVIRUS Not Detected Not Detected    RHINOVIRUS Not Detected Not Detected    ADENOVIRUS Not Detected Not Detected    PARAINFLUENZA 1 Not Detected Not Detected    PARAINFLUENZA 2 Not Detected Not Detected    PARAINFLUENZA 3 Not Detected Not Detected    Parainfluenza 4 Not Detected Not Detected    Human Bocavirus Not Detected Not Detected    Chlamydophila pneumoniae Not Detected Not Detected    Mycoplasma pneumoniae Not Detected Not Detected   CBC and differential   Result Value Ref Range    WBC 9 63 4 31 - 10 16 Thousand/uL    RBC 5 00 3 81 - 5 12 Million/uL    Hemoglobin 13 3 11 5 - 15 4 g/dL    Hematocrit 42 7 34 8 - 46 1 %    MCV 85 82 - 98 fL    MCH 26 6 (L) 26 8 - 34 3 pg    MCHC 31 1 (L) 31 4 - 37 4 g/dL    RDW 18 4 (H) 11 6 - 15 1 %    MPV 9 3 8 9 - 12 7 fL    Platelets 739 070 - 759 Thousands/uL    nRBC 0 /100 WBCs    Neutrophils Relative 85 (H) 43 - 75 %    Immat GRANS % 0 0 - 2 %    Lymphocytes Relative 7 (L) 14 - 44 %    Monocytes Relative 8 4 - 12 %    Eosinophils Relative 0 0 - 6 %    Basophils Relative 0 0 - 1 %    Neutrophils Absolute 8 05 (H) 1 85 - 7 62 Thousands/µL    Immature Grans Absolute 0 04 0 00 - 0 20 Thousand/uL    Lymphocytes Absolute 0 71 0 60 - 4 47 Thousands/µL    Monocytes Absolute 0 80 0 17 - 1 22 Thousand/µL    Eosinophils Absolute 0 00 0 00 - 0 61 Thousand/µL    Basophils Absolute 0 03 0 00 - 0 10 Thousands/µL   Protime-INR   Result Value Ref Range    Protime 10 6 9 4 - 11 7 seconds    INR 1 01 0 86 - 1 16   APTT   Result Value Ref Range    PTT 28 24 - 33 seconds   Comprehensive metabolic panel   Result Value Ref Range    Sodium 129 (L) 136 - 145 mmol/L    Potassium 4 6 3 5 - 5 3 mmol/L Chloride 92 (L) 100 - 108 mmol/L    CO2 38 (H) 21 - 32 mmol/L    ANION GAP -1 (L) 4 - 13 mmol/L    BUN 11 5 - 25 mg/dL    Creatinine 0 25 (L) 0 60 - 1 30 mg/dL    Glucose 108 65 - 140 mg/dL    Calcium 8 8 8 3 - 10 1 mg/dL    AST 16 5 - 45 U/L    ALT 22 12 - 78 U/L    Alkaline Phosphatase 108 46 - 116 U/L    Total Protein 6 7 6 4 - 8 2 g/dL    Albumin 2 7 (L) 3 5 - 5 0 g/dL    Total Bilirubin 0 30 0 20 - 1 00 mg/dL    eGFR 110 ml/min/1 73sq m   Troponin I   Result Value Ref Range    Troponin I <0 02 <=0 04 ng/mL   B-type natriuretic peptide   Result Value Ref Range    NT-proBNP 873 (H) <450 pg/mL   UA w Reflex to Microscopic w Reflex to Culture   Result Value Ref Range    Color, UA Yellow     Clarity, UA Clear     Specific [place], UA 1 010 1 000 - 1 030    pH, UA 5 0 5 0 - 9 0    Leukocytes, UA Negative Negative    Nitrite, UA Positive (A) Negative    Protein, UA Trace (A) Negative mg/dl    Glucose, UA Negative Negative mg/dl    Ketones, UA 15 (1+) (A) Negative mg/dl    Urobilinogen, UA 0 2 0 2, 1 0 E U /dl E U /dl    Bilirubin, UA Negative Negative    Blood, UA Small (A) Negative   Comprehensive metabolic panel   Result Value Ref Range    Sodium 132 (L) 136 - 145 mmol/L    Potassium 4 5 3 5 - 5 3 mmol/L    Chloride 93 (L) 100 - 108 mmol/L    CO2 36 (H) 21 - 32 mmol/L    ANION GAP 3 (L) 4 - 13 mmol/L    BUN 10 5 - 25 mg/dL    Creatinine 0 27 (L) 0 60 - 1 30 mg/dL    Glucose 113 65 - 140 mg/dL    Calcium 8 8 8 3 - 10 1 mg/dL    AST 13 5 - 45 U/L    ALT 23 12 - 78 U/L    Alkaline Phosphatase 104 46 - 116 U/L    Total Protein 6 8 6 4 - 8 2 g/dL    Albumin 2 8 (L) 3 5 - 5 0 g/dL    Total Bilirubin 0 20 0 20 - 1 00 mg/dL    eGFR 107 ml/min/1 73sq m   Magnesium   Result Value Ref Range    Magnesium 1 8 1 6 - 2 6 mg/dL   Phosphorus   Result Value Ref Range    Phosphorus 2 7 2 3 - 4 1 mg/dL   CBC and differential   Result Value Ref Range    WBC 9 14 4 31 - 10 16 Thousand/uL    RBC 4 84 3 81 - 5 12 Million/uL    Hemoglobin 12 9 11 5 - 15 4 g/dL    Hematocrit 43 2 34 8 - 46 1 %    MCV 89 82 - 98 fL    MCH 26 7 (L) 26 8 - 34 3 pg    MCHC 29 9 (L) 31 4 - 37 4 g/dL    RDW 14 6 11 6 - 15 1 %    MPV 9 1 8 9 - 12 7 fL    Platelets 774 908 - 765 Thousands/uL    nRBC 0 /100 WBCs    Neutrophils Relative 86 (H) 43 - 75 %    Immat GRANS % 1 0 - 2 %    Lymphocytes Relative 8 (L) 14 - 44 %    Monocytes Relative 5 4 - 12 %    Eosinophils Relative 0 0 - 6 %    Basophils Relative 0 0 - 1 %    Neutrophils Absolute 7 92 (H) 1 85 - 7 62 Thousands/µL    Immature Grans Absolute 0 05 0 00 - 0 20 Thousand/uL    Lymphocytes Absolute 0 73 0 60 - 4 47 Thousands/µL    Monocytes Absolute 0 42 0 17 - 1 22 Thousand/µL    Eosinophils Absolute 0 00 0 00 - 0 61 Thousand/µL    Basophils Absolute 0 02 0 00 - 0 10 Thousands/µL   Blood gas, arterial   Result Value Ref Range    pH, Arterial 7 289 (L) 7 350 - 7 450    PH ART TC 7 295 (L) 7 350 - 7 450    pCO2, Arterial 82 0 (HH) 36 0 - 44 0 mm Hg    PCO2 (TC) Arterial 80 6 (HH) 36 0 - 44 0 mm Hg    pO2, Arterial 91 6 75 0 - 129 0 mm Hg    PO2 (TC) Arterial 89 3 75 0 - 129 0 mm Hg    HCO3, Arterial 38 5 (H) 22 0 - 28 0 mmol/L    Base Excess, Arterial 8 6 mmol/L    O2 Content, Arterial 18 5 16 0 - 23 0 mL/dL    O2 HGB,Arterial  95 6 94 0 - 97 0 %    SOURCE Radial, Right     MANDO TEST Yes     Temperature 97 9 Degrees Fehrenheit    Non Vent type BIPAP BIPAP     IPAP 12     EPAP 6     BIPAP fio2 40 %   Blood gas, arterial   Result Value Ref Range    pH, Arterial 7 314 (L) 7 350 - 7 450    PH ART TC 7 317 (L) 7 350 - 7 450    pCO2, Arterial 70 7 (HH) 36 0 - 44 0 mm Hg    PCO2 (TC) Arterial 70 1 (HH) 36 0 - 44 0 mm Hg    pO2, Arterial 65 9 (L) 75 0 - 129 0 mm Hg    PO2 (TC) Arterial 65 0 (L) 75 0 - 129 0 mm Hg    HCO3, Arterial 35 1 (H) 22 0 - 28 0 mmol/L    Base Excess, Arterial 6 6 mmol/L    O2 Content, Arterial 16 9 16 0 - 23 0 mL/dL    O2 HGB,Arterial  91 5 (L) 94 0 - 97 0 %    SOURCE Radial, Right     Temperature 98 2 Degrees Fehrenheit   Procalcitonin   Result Value Ref Range    Procalcitonin <0 05 <=0 25 ng/ml   Blood gas, arterial   Result Value Ref Range    pH, Arterial 7 381 7 350 - 7 450    PH ART TC 7 380 7 350 - 7 450    pCO2, Arterial 59 1 (HH) 36 0 - 44 0 mm Hg    PCO2 (TC) Arterial 59 4 (HH) 36 0 - 44 0 mm Hg    pO2, Arterial 71 7 (L) 75 0 - 129 0 mm Hg    PO2 (TC) Arterial 72 2 (L) 75 0 - 129 0 mm Hg    HCO3, Arterial 34 3 (H) 22 0 - 28 0 mmol/L    Base Excess, Arterial 7 2 mmol/L    O2 Content, Arterial 18 0 16 0 - 23 0 mL/dL    O2 HGB,Arterial  93 6 (L) 94 0 - 97 0 %    SOURCE Radial, Right     MANDO TEST Yes     Temperature 98 8 Degrees Fehrenheit    Non Vent type BIPAP      Non Vent type- CPAP      Other FIO2 25 %   Urine Microscopic   Result Value Ref Range    RBC, UA 0-1 (A) None Seen, 0-5 /hpf    WBC, UA 4-10 (A) None Seen, 0-5, 5-55, 5-65 /hpf    Epithelial Cells Occasional None Seen, Occasional /hpf    Bacteria, UA Moderate (A) None Seen, Occasional /hpf   Procalcitonin   Result Value Ref Range    Procalcitonin <0 05 <=0 25 ng/ml   CBC and differential   Result Value Ref Range    WBC 4 54 4 31 - 10 16 Thousand/uL    RBC 4 23 3 81 - 5 12 Million/uL    Hemoglobin 11 3 (L) 11 5 - 15 4 g/dL    Hematocrit 36 9 34 8 - 46 1 %    MCV 87 82 - 98 fL    MCH 26 7 (L) 26 8 - 34 3 pg    MCHC 30 6 (L) 31 4 - 37 4 g/dL    RDW 14 9 11 6 - 15 1 %    MPV 8 8 (L) 8 9 - 12 7 fL    Platelets 699 507 - 863 Thousands/uL    nRBC 0 /100 WBCs    Neutrophils Relative 82 (H) 43 - 75 %    Immat GRANS % 1 0 - 2 %    Lymphocytes Relative 12 (L) 14 - 44 %    Monocytes Relative 5 4 - 12 %    Eosinophils Relative 0 0 - 6 %    Basophils Relative 0 0 - 1 %    Neutrophils Absolute 3 73 1 85 - 7 62 Thousands/µL    Immature Grans Absolute 0 04 0 00 - 0 20 Thousand/uL    Lymphocytes Absolute 0 56 (L) 0 60 - 4 47 Thousands/µL    Monocytes Absolute 0 21 0 17 - 1 22 Thousand/µL    Eosinophils Absolute 0 00 0 00 - 0 61 Thousand/µL    Basophils Absolute 0  00 0 00 - 0 10 Thousands/µL   Magnesium   Result Value Ref Range    Magnesium 1 9 1 6 - 2 6 mg/dL   Phosphorus   Result Value Ref Range    Phosphorus 2 3 2 3 - 4 1 mg/dL   Basic metabolic panel   Result Value Ref Range    Sodium 137 136 - 145 mmol/L    Potassium 4 4 3 5 - 5 3 mmol/L    Chloride 97 (L) 100 - 108 mmol/L    CO2 37 (H) 21 - 32 mmol/L    ANION GAP 3 (L) 4 - 13 mmol/L    BUN 28 (H) 5 - 25 mg/dL    Creatinine 0 52 (L) 0 60 - 1 30 mg/dL    Glucose 144 (H) 65 - 140 mg/dL    Calcium 8 8 8 3 - 10 1 mg/dL    eGFR 86 ml/min/1 73sq m   CBC and differential   Result Value Ref Range    WBC 7 23 4 31 - 10 16 Thousand/uL    RBC 4 43 3 81 - 5 12 Million/uL    Hemoglobin 11 7 11 5 - 15 4 g/dL    Hematocrit 39 4 34 8 - 46 1 %    MCV 89 82 - 98 fL    MCH 26 4 (L) 26 8 - 34 3 pg    MCHC 29 7 (L) 31 4 - 37 4 g/dL    RDW 15 0 11 6 - 15 1 %    MPV 8 9 8 9 - 12 7 fL    Platelets 431 789 - 018 Thousands/uL    nRBC 0 /100 WBCs    Neutrophils Relative 86 (H) 43 - 75 %    Immat GRANS % 2 0 - 2 %    Lymphocytes Relative 7 (L) 14 - 44 %    Monocytes Relative 5 4 - 12 %    Eosinophils Relative 0 0 - 6 %    Basophils Relative 0 0 - 1 %    Neutrophils Absolute 6 19 1 85 - 7 62 Thousands/µL    Immature Grans Absolute 0 12 0 00 - 0 20 Thousand/uL    Lymphocytes Absolute 0 53 (L) 0 60 - 4 47 Thousands/µL    Monocytes Absolute 0 38 0 17 - 1 22 Thousand/µL    Eosinophils Absolute 0 00 0 00 - 0 61 Thousand/µL    Basophils Absolute 0 01 0 00 - 0 10 Thousands/µL   Basic metabolic panel   Result Value Ref Range    Sodium 137 136 - 145 mmol/L    Potassium 4 7 3 5 - 5 3 mmol/L    Chloride 99 (L) 100 - 108 mmol/L    CO2 39 (H) 21 - 32 mmol/L    ANION GAP -1 (L) 4 - 13 mmol/L    BUN 28 (H) 5 - 25 mg/dL    Creatinine 0 50 (L) 0 60 - 1 30 mg/dL    Glucose 144 (H) 65 - 140 mg/dL    Calcium 8 7 8 3 - 10 1 mg/dL    eGFR 87 ml/min/1 73sq m   Magnesium   Result Value Ref Range    Magnesium 2 0 1 6 - 2 6 mg/dL   Phosphorus   Result Value Ref Range Phosphorus 2 7 2 3 - 4 1 mg/dL   CBC and differential   Result Value Ref Range    WBC 13 10 (H) 4 31 - 10 16 Thousand/uL    RBC 4 84 3 81 - 5 12 Million/uL    Hemoglobin 12 9 11 5 - 15 4 g/dL    Hematocrit 43 2 34 8 - 46 1 %    MCV 89 82 - 98 fL    MCH 26 7 (L) 26 8 - 34 3 pg    MCHC 29 9 (L) 31 4 - 37 4 g/dL    RDW 14 6 11 6 - 15 1 %    MPV 9 0 8 9 - 12 7 fL    Platelets 953 175 - 527 Thousands/uL    nRBC 0 /100 WBCs    Neutrophils Relative 90 (H) 43 - 75 %    Immat GRANS % 2 0 - 2 %    Lymphocytes Relative 4 (L) 14 - 44 %    Monocytes Relative 4 4 - 12 %    Eosinophils Relative 0 0 - 6 %    Basophils Relative 0 0 - 1 %    Neutrophils Absolute 11 88 (H) 1 85 - 7 62 Thousands/µL    Immature Grans Absolute 0 19 0 00 - 0 20 Thousand/uL    Lymphocytes Absolute 0 54 (L) 0 60 - 4 47 Thousands/µL    Monocytes Absolute 0 47 0 17 - 1 22 Thousand/µL    Eosinophils Absolute 0 00 0 00 - 0 61 Thousand/µL    Basophils Absolute 0 02 0 00 - 0 10 Thousands/µL   Magnesium   Result Value Ref Range    Magnesium 2 1 1 6 - 2 6 mg/dL   Phosphorus   Result Value Ref Range    Phosphorus 3 2 2 3 - 4 1 mg/dL   Comprehensive metabolic panel   Result Value Ref Range    Sodium 137 136 - 145 mmol/L    Potassium 4 8 3 5 - 5 3 mmol/L    Chloride 95 (L) 100 - 108 mmol/L    CO2 43 (H) 21 - 32 mmol/L    ANION GAP -1 (L) 4 - 13 mmol/L    BUN 27 (H) 5 - 25 mg/dL    Creatinine 0 46 (L) 0 60 - 1 30 mg/dL    Glucose 146 (H) 65 - 140 mg/dL    Calcium 8 9 8 3 - 10 1 mg/dL    AST 68 (H) 5 - 45 U/L     (H) 12 - 78 U/L    Alkaline Phosphatase 87 46 - 116 U/L    Total Protein 6 4 6 4 - 8 2 g/dL    Albumin 2 8 (L) 3 5 - 5 0 g/dL    Total Bilirubin 0 30 0 20 - 1 00 mg/dL    eGFR 90 ml/min/1 73sq m   Procalcitonin   Result Value Ref Range    Procalcitonin <0 05 <=0 25 ng/ml   NT-BNP PRO (BNP for [place], [place], [place], [place], [place], [facility], [facility], [place] campuses)   Result Value Ref Range    NT-proBNP 1,996 (H) <450 pg/mL   Procalcitonin   Result Value Ref Range    Procalcitonin <0 05 <=0 25 ng/ml   Procalcitonin   Result Value Ref Range    Procalcitonin <0 05 <=0 25 ng/ml   Magnesium   Result Value Ref Range    Magnesium 2 1 1 6 - 2 6 mg/dL   Phosphorus   Result Value Ref Range    Phosphorus 3 1 2 3 - 4 1 mg/dL   CBC and differential   Result Value Ref Range    WBC 10 35 (H) 4 31 - 10 16 Thousand/uL    RBC 4 65 3 81 - 5 12 Million/uL    Hemoglobin 12 3 11 5 - 15 4 g/dL    Hematocrit 41 7 34 8 - 46 1 %    MCV 90 82 - 98 fL    MCH 26 5 (L) 26 8 - 34 3 pg    MCHC 29 5 (L) 31 4 - 37 4 g/dL    RDW 14 5 11 6 - 15 1 %    MPV 8 7 (L) 8 9 - 12 7 fL    Platelets 432 474 - 082 Thousands/uL    nRBC 0 /100 WBCs    Neutrophils Relative 88 (H) 43 - 75 %    Immat GRANS % 2 0 - 2 %    Lymphocytes Relative 5 (L) 14 - 44 %    Monocytes Relative 5 4 - 12 %    Eosinophils Relative 0 0 - 6 %    Basophils Relative 0 0 - 1 %    Neutrophils Absolute 9 08 (H) 1 85 - 7 62 Thousands/µL    Immature Grans Absolute 0 25 (H) 0 00 - 0 20 Thousand/uL    Lymphocytes Absolute 0 51 (L) 0 60 - 4 47 Thousands/µL    Monocytes Absolute 0 48 0 17 - 1 22 Thousand/µL    Eosinophils Absolute 0 00 0 00 - 0 61 Thousand/µL    Basophils Absolute 0 03 0 00 - 0 10 Thousands/µL   Basic metabolic panel   Result Value Ref Range    Sodium 137 136 - 145 mmol/L    Potassium 5 1 3 5 - 5 3 mmol/L    Chloride 93 (L) 100 - 108 mmol/L    CO2 43 (H) 21 - 32 mmol/L    ANION GAP 1 (L) 4 - 13 mmol/L    BUN 24 5 - 25 mg/dL    Creatinine 0 37 (L) 0 60 - 1 30 mg/dL    Glucose 139 65 - 140 mg/dL    Calcium 8 7 8 3 - 10 1 mg/dL    eGFR 96 ml/min/1 73sq m   NT-BNP PRO   Result Value Ref Range    NT-proBNP 701 (H) <450 pg/mL   Basic metabolic panel   Result Value Ref Range    Sodium 137 136 - 145 mmol/L    Potassium 4 8 3 5 - 5 3 mmol/L    Chloride 93 (L) 100 - 108 mmol/L    CO2 44 (H) 21 - 32 mmol/L    ANION GAP 0 (L) 4 - 13 mmol/L    BUN 26 (H) 5 - 25 mg/dL    Creatinine 0 47 (L) 0 60 - 1 30 mg/dL    Glucose 155 (H) 65 - 140 mg/dL    Calcium 8 8 8 3 - 10 1 mg/dL eGFR 89 ml/min/1 73sq m   CBC and differential   Result Value Ref Range    WBC 10 98 (H) 4 31 - 10 16 Thousand/uL    RBC 4 70 3 81 - 5 12 Million/uL    Hemoglobin 12 4 11 5 - 15 4 g/dL    Hematocrit 41 7 34 8 - 46 1 %    MCV 89 82 - 98 fL    MCH 26 4 (L) 26 8 - 34 3 pg    MCHC 29 7 (L) 31 4 - 37 4 g/dL    RDW 14 4 11 6 - 15 1 %    MPV 9 0 8 9 - 12 7 fL    Platelets 719 655 - 826 Thousands/uL    nRBC 0 /100 WBCs    Neutrophils Relative 90 (H) 43 - 75 %    Immat GRANS % 2 0 - 2 %    Lymphocytes Relative 5 (L) 14 - 44 %    Monocytes Relative 3 (L) 4 - 12 %    Eosinophils Relative 0 0 - 6 %    Basophils Relative 0 0 - 1 %    Neutrophils Absolute 9 81 (H) 1 85 - 7 62 Thousands/µL    Immature Grans Absolute 0 24 (H) 0 00 - 0 20 Thousand/uL    Lymphocytes Absolute 0 54 (L) 0 60 - 4 47 Thousands/µL    Monocytes Absolute 0 37 0 17 - 1 22 Thousand/µL    Eosinophils Absolute 0 00 0 00 - 0 61 Thousand/µL    Basophils Absolute 0 02 0 00 - 0 10 Thousands/µL   Phosphorus   Result Value Ref Range    Phosphorus 3 1 2 3 - 4 1 mg/dL   Magnesium   Result Value Ref Range    Magnesium 2 1 1 6 - 2 6 mg/dL   Comprehensive metabolic panel   Result Value Ref Range    Sodium 137 136 - 145 mmol/L    Potassium 4 8 3 5 - 5 3 mmol/L    Chloride 95 (L) 100 - 108 mmol/L    CO2 42 (H) 21 - 32 mmol/L    ANION GAP 0 (L) 4 - 13 mmol/L    BUN 25 5 - 25 mg/dL    Creatinine 0 43 (L) 0 60 - 1 30 mg/dL    Glucose 152 (H) 65 - 140 mg/dL    Calcium 8 6 8 3 - 10 1 mg/dL    AST 14 5 - 45 U/L    ALT 83 (H) 12 - 78 U/L    Alkaline Phosphatase 70 46 - 116 U/L    Total Protein 6 0 (L) 6 4 - 8 2 g/dL    Albumin 2 7 (L) 3 5 - 5 0 g/dL    Total Bilirubin 0 30 0 20 - 1 00 mg/dL    eGFR 92 ml/min/1 73sq m   ECG 12 lead   Result Value Ref Range    Ventricular Rate 92 BPM    Atrial Rate 92 BPM    OK Interval 190 ms    QRSD Interval 132 ms    QT Interval 388 ms    QTC Interval 479 ms    P [place] 64 degrees    QRS Axis 92 degrees    T Wave Axis 45 degrees   POCT Blood Gas (CG8+)   Result Value Ref Range    pH, Art i-STAT 7 366 7 350 - 7 450    pH, i-STAT Temp Corrected 7 374     pCO2, Art i-STAT 64 5 (H) 36 0 - 44 0 mm HG    pCO2, i-STAT TC 63 0 mm HG    pO2, ART i-STAT 89 0 75 0 - 129 0 mm HG    pO2, i-STAT TC 86 mm HG    BE, i-STAT 9 (H) -2 - 3 mmol/L    HCO3, Art i-STAT 37 0 (H) 22 0 - 28 0 mmol/L    CO2, i-STAT 39 (H) 21 - 32 mmol/L    O2 Sat, i-STAT 96 95 - 98 %    SODIUM, I-STAT 124 (L) 136 - 145 mmol/l    Potassium, i-STAT 4 0 3 5 - 5 3 mmol/L    Calcium, Ionized i-STAT 1 20 1  12 - 1 32 mmol/L    Hct, i-STAT 36 34 8 - 46 1 %    Hgb, i-STAT 12 2 11 5 - 15 4 g/dl    Glucose, i-STAT 121 65 - 140 mg/dl    Specimen Type ARTERIAL     Delivery System Nasal Cannula     Respiratory Rate 0     Vent Type 00     Vent Value 002     Ancillary Values 0     Pressure Setting 00     SITE 000     Additional Settings 00     MANDO TEST 000    Fingerstick Glucose (POCT)   Result Value Ref Range    POC Glucose 156 (H) 65 - 140 mg/dl     CT chest wo contrast   Final Result         1  Right upper lobe infiltrate and left basilar consolidation concerning for multifocal pneumonia  2   Chronic right lower lobe and basilar infiltrates, perhaps fibrosis due to chronic aspiration with trace pleural effusion  3   Small left pleural effusion  4   Evidence of remote granulomatous disease  Workstation performed: [id number]         XR chest portable   Final Result      Moderate to severe cardiomegaly with mild pulmonary vascular congestion with small right pleural effusion suggesting mild to moderate CHF  Right lower lobe lung opacity could relate to atelectasis and/or pneumonia  No pneumothorax  CT chest with contrast recommended for further evaluation  Workstation performed: [id number]         IR PICC line   Final Result   Successful placement of right arm midline catheter  The catheter is ready for use  _______________________________________________________________   PROCEDURE DETAILS:    Operators: Dr Jamal Khan, [address] [place] [id number] attending, performed the procedure  Anesthesia: 1% lidocaine was injected in the skin and subcutaneous tissues overlying the access site  Medications: 1% lidocaine   Contrast: 0 mL of Omnipaque 300   Fluoroscopy time: 0 minutes   Images: 2      COMMENTS:   Following the discussion of the risks, benefits and alternatives to the procedure, written informed consent was obtained from the patient and her daughter  The procedure was done at the bedside in the ICU  The right arm veins were evaluated as a    potential access site with ultrasound  The site was prepped and draped in the usual sterile fashion  All elements of maximal sterile barrier technique, cap and mask and sterile gown and sterile gloves and sterile full-body drape and hand hygiene and 2%    chlorhexidine for cutaneous antisepsis  Sterile ultrasound technique with sterile gel and sterile probe covers was also utilized  A preprocedure timeout was performed per [facility] protocol  Lidocaine was administered to the skin and a small skin incision was made  Under ultrasound guidance, the right basilic vein was punctured using a micropuncture set  Static images of real time needle entry into the vessel were obtained  A peel-away    sheath was then placed over a guidewire  A double lumen power catheter measuring   5 cm in length was then placed through the peel-away sheath  The peel-away sheath and guidewire were then removed  The catheter was aspirated and then flushed with    saline  The catheter was secured to the skin and a sterile dressing was applied  Workstation performed: [id number]         XR chest 1 view portable   Final Result   Stable cardiomegaly without acute pulmonary disease        Workstation performed: [id number]               HPI  As per H&P,   Indio Moseley is a 79 yo F with PMH of muscular dystrophy, chronic respiratory failure, morbid obesity, heart failure with preserved ejection fraction, hypertension who presents today for a 4 day history of URI symptoms including non productive cough, congestion, wheezing, fatigue decreased appetite  Symptoms began gradually and have progressively worsened to the point where family noted that pt was very lethargic and slightly confused today  Pt is on baseline 2L of O2 PRN during the day and Bipap at night, but has not used her BiPAP for the past 2-3 days due to cough  Denies fevers, chills, headaches, dizziness, nausea, vomiting, chest pain, chest tightness, syncope, lower extremity edema  Pt states she thinks she may have a urine infection due to increased urinary frequency, but her daughter who takes care of her denies this  ED course: Bipap, CXR, Neb treatment  Hospital Course    * Acute on chronic respiratory failure with hypoxia and hypercapnia (HCC)  Assessment & Plan  Patient has history of chronic respiratory failure 2/2 muscular dystrophy vs obesity hypoventilation syndrome presents with SOB & hypoxia likely due to bronchitis  Influenza/RSV PCR, & urine strep/legionella negative  Chest CT-  Right upper lobe infiltrate and left basilar consolidation concerning for multifocal pneumonia  Chronic right lower lobe and basilar infiltrates, perhaps fibrosis due to chronic aspiration with trace pleural effusion  Small left pleural effusion  Evidence of remote granulomatous disease  Procalcitonin negative, no additional antibiotic treatment necessary after patient completed 4 day course of azithromycin during her hospitalization  Patient had episode of worsening of hypoxia and hypercapnia which required an increased setting on BiPAP  After brief treatment on BiPAP, symptoms resolved  Patient  was continued on home BiPAP setting of 10/5   Chest XR 2/25/19- Moderate to severe cardiomegaly with mild pulmonary vascular congestion with small right pleural effusion suggesting mild to moderate CHF  Right lower lobe lung opacity possibly related to atelectasis and/or pneumonia  Initial BNP 1996 improve down to 701  Patient was given Lasix 10 mg every other day but was increased up to 40 mg PO daily  Patient will be discharged home on Lasix 20 mg p o  Daily  During hospitalization patient required oxygen supplementation via nasal cannula  Upon discharge patient was required to L O2 supplementation  Labs and vitals were stable upon discharge  During hospitalization patient completed 3 treated course of Solu-Medrol, and will be discharged on a prednisone taper at home  Patient was provided with chest PT and given Xopenex breathing treatments with Mucomyst   Pulmonology was following patient during hospitalization  Cough assist device ordered and will be used after discharge  Essential hypertension  Assessment & Plan  · Stable during hospitalization  /66 prior to discharge  Patient was followed by Cardiology during hospitalization  Patient was given Toprol XL 25mg PO BID & Cardizem 240mg PO qd  Patient was initially given Lasix 10 mg every other day, but was increased to 40 mg daily  Patient will be discharged home on Lasix 20 mg daily  Vitals were monitored regularly  ECHO 2/26/19: EF 55-60%  Muscular dystrophy, 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  Pt has been bed bound for > 20 years  Patient was encouraged to moved to chair throughout the day  Upon discharge patient will be discharged to home with home visits as patient refused outpatient rehabilitation    Acute on chronic diastolic heart failure [facility])  Assessment & Plan  · Cardiac echo 2/26/19- EF 55-60%  Chest CT 2/26/19- Right upper lobe infiltrate and left basilar consolidation concerning for multifocal pneumonia  Chronic right lower lobe and basilar infiltrates, perhaps fibrosis due to chronic aspiration with trace pleural effusion  Small left pleural effusion   Evidence of remote granulomatous disease  CXR 2/25- moderate to severe cardiomegaly with mild pulmonary vascular congestion with small right pleural effusion suggesting mild-to-moderate CHF  Right lower lobe lung opacity possibly related to atelectasis and/or pneumonia  Initial BNP 1 996 which came down to  during hospitalization  During hospitalization had patient had episode of desaturation down to the 80s with concern for possible fluid overload  Patient was given a 1 time dose of 40 mg Lasix  Initially patient was on Lasix 10 mg every other day, but dose was increased to 40 mg daily after episode of desaturation  Patient will be discharged to go home on Lasix 20 mg PO daily  Patient was followed by Cardiology during admission  Given home dose of Toprol XL 25mg BID, & Cardizem 240mg PO qd, which she will continue after discharge to home  PORTIA treated with BiPAP  Assessment & Plan  · Patient is on BiPAP at home overnight  Required a brief increased level for few days  Was eventually restarted on home setting of 10/5 overnight several days prior to discharge which she has been tolerating well  Patient will continue BiPAP at home  Hyponatremia  Assessment & Plan  · Decreased sodium level initially, which resolved  Na+ remained stable at 137  BMP was monitored daily    Generalized osteoarthritis  Assessment & Plan  · Chronic, Stable   Patient was given Tylenol 650mg Q6H prn    Morbid obesity ([address] )  Assessment & Plan  · Patient has limited activity due to chronic muscular dystrophy  Nutrition consult was placed and patient was advised to lose weight    Elevated liver enzymes  Assessment & Plan  · Patient was found to have AST 68  on 2/26  This was likely due to infectious process    No concern for further intervention at this time    Acute bronchitisresolved as of 2/28/2019  Assessment & Plan  · Continue treatment with Breo and Atrovent   · Continue O2 supplementation, currently on 3L NC · Patient completed course of azithromycin  · No need for antibiotic therapy at this time  · Cough assist device as prescribed by pulmonology        Physical Exam at Discharge  General appearance: alert  Lungs: diminished breath sounds and Rhonchi bilateral  Heart: regular rate and rhythm  Abdomen: Soft, obese, nontender, positive bowel sounds in 4 quadrants  Extremities: Mild edema of bilateral lower extremities  Skin: Skin color, texture, turgor normal  No rashes or lesions    Medications    acetaminophen 325 mg tablet   Commonly known as: TYLENOL   Take one or 2 every 6 hours as needed for pain   Refills: 0          celecoxib 200 mg capsule   Commonly known as: CeleBREX   Take 1 capsule (200 mg total) by mouth daily   Refills: 0          diltiazem 240 mg 24 hr capsule   Commonly known as: CARDIZEM CD   Take 1 capsule (240 mg total) by mouth daily   Refills: 3          fluticasone-vilanterol 100-25 mcg/inh inhaler   Commonly known as: BREO ELLIPTA   Start taking on: 3/1/2019   Inhale 1 puff daily Rinse mouth after use  Refills: 0          furosemide 20 mg tablet   Commonly known as: LASIX   Take 0 5 tablets (10 mg total) by mouth every other day Pt takes half a pill 3 x a week   Refills: 5          guaiFENesin 600 mg 12 hr tablet   Commonly known as: MUCINEX   Take 1 tablet (600 mg total) by mouth every 12 (twelve) hours for 30 days   Refills: 3          ipratropium 0 02 % nebulizer solution   Commonly known as: ATROVENT   Take 1 vial (0 5 mg total) by nebulization 3 (three) times a day   Refills: 0          ipratropium-albuterol 0 5-2 5 mg/3 mL nebulizer solution   Commonly known as: DUO-NEB   Take 3 mL by nebulization every 6 (six) hours   Refills: 6          metoprolol succinate 25 mg 24 hr tablet   Commonly known as: TOPROL-XL   Take 25 mg by mouth 2 (two) times a day     Refills: 0          nystatin powder   Commonly known as: MYCOSTATIN   Apply topically 4 (four) times a day   Refills: 3 predniSONE 20 mg tablet   For: Chronic Obstructive Lung Disease   Start taking on: 2/28/2019   Take 2 tablets (40 mg total) by mouth daily for 3 days, THEN 1 5 tablets (30 mg total) daily for 3 days, THEN 1 tablet (20 mg total) daily for 3 days, THEN 0 5 tablets (10 mg total) daily for 3 days  Refills: 0          silver sulfadiazine 1 % cream   Commonly known as: SILVADENE,SSD   Apply topically daily   Refills: 3               Allergies  No Known Allergies    Diet restrictions: none  Activity restrictions: Patient has decreased activity due to muscular dystrophy  Code Status: Prior    Discharge Condition: stable      Discharge  Statement   I spent 30 minutes discharging the patient  This time was spent on the day of discharge  I had direct contact with the patient on the day of discharge  Additional documentation is required if more than 30 minutes were spent on discharge

## 2019-02-28 NOTE — PLAN OF CARE
Problem: RESPIRATORY - ADULT  Goal: Achieves optimal ventilation and oxygenation  Description  INTERVENTIONS:  - Assess for changes in respiratory status  - Assess for changes in mentation and behavior  - Position to facilitate oxygenation and minimize respiratory effort  - Oxygen administration by appropriate delivery method based on oxygen saturation (per order) or ABGs  - Encourage broncho-pulmonary hygiene including cough, deep breathe, Incentive Spirometry  - Assess the need for suctioning and aspirate as needed  - Assess and instruct to report SOB or any respiratory difficulty  - Respiratory Therapy support as indicated   - Continue with bipap for hours of sleep  - Continue with nebulizer treatments  - Continue with airway clearance     Outcome: Adequate for Discharge     Problem: PAIN - ADULT  Goal: Verbalizes/displays adequate comfort level or baseline comfort level  Description  Interventions:  - Encourage patient to monitor pain and request assistance  - Assess pain using appropriate pain scale  - Administer analgesics based on type and severity of pain and evaluate response  - Implement non-pharmacological measures as appropriate and evaluate response  - Notify physician/advanced practitioner if interventions unsuccessful or patient reports new pain   Outcome: Adequate for Discharge     Problem: INFECTION - ADULT  Goal: Absence or prevention of progression during hospitalization  Description  INTERVENTIONS:  - Assess and monitor for signs and symptoms of infection  - Monitor lab/diagnostic results  - Monitor all insertion sites, i e  indwelling lines, tubes, and drains  - Monitor nasal secretions for changes in amount and color  - [place] appropriate cooling/warming therapies per order  - Administer medications as ordered  - Instruct and encourage patient and family to use good hand hygiene technique  - Identify and instruct in appropriate isolation precautions for identified infection/condition Outcome: Adequate for Discharge     Problem: SAFETY ADULT  Goal: Maintain or return to baseline ADL function  Description  INTERVENTIONS:  -  Assess patient's ability to carry out ADLs; assess patient's baseline for ADL function and identify physical deficits which impact ability to perform ADLs (bathing, care of mouth/teeth, toileting, grooming, dressing, etc )  - Assess/evaluate cause of self-care deficits   - Assess range of motion  - Assess patient's mobility; develop plan if impaired  - Assess patient's need for assistive devices and provide as appropriate  - Encourage maximum independence but intervene and supervise when necessary  ¯ Involve family in performance of ADLs  ¯ Assess for home care needs following discharge   ¯ Request OT consult to assist with ADL evaluation and planning for discharge  ¯ Provide patient education as appropriate  Outcome: Adequate for Discharge  Goal: Maintain or return mobility status to optimal level  Description  INTERVENTIONS:  - Assess patient's baseline mobility status (ambulation, transfers, stairs, etc )    - Identify cognitive and physical deficits and behaviors that affect mobility  - Identify mobility aids required to assist with transfers and/or ambulation (gait belt, sit-to-stand, lift, walker, cane, etc )  - [place] fall precautions as indicated by assessment  - Instruct patient to call for assistance with activity based on assessment  - Request Rehabilitation consult to assist with strengthening/weightbearing, etc    Outcome: Adequate for Discharge     Problem: DISCHARGE PLANNING  Goal: Discharge to home or other facility with appropriate resources  Description  INTERVENTIONS:  - Identify barriers to discharge w/patient and caregiver  - Arrange for needed discharge resources and transportation as appropriate  - Identify discharge learning needs (meds, wound care, etc )  - Refer to Case Management Department for coordinating discharge planning if the patient needs post-hospital services based on physician/advanced practitioner order or complex needs related to functional status, cognitive ability, or social support system   Outcome: Adequate for Discharge     Problem: Knowledge Deficit  Goal: Patient/family/caregiver demonstrates understanding of disease process, treatment plan, medications, and discharge instructions  Description  Complete learning assessment and assess knowledge base    Interventions:  - Provide teaching at level of understanding  - Provide teaching via preferred learning methods  Outcome: Adequate for Discharge     Problem: DISCHARGE PLANNING - CARE MANAGEMENT  Goal: Discharge to post-acute care or home with appropriate resources  Description  INTERVENTIONS:  - Conduct assessment to determine patient/family and health care team treatment goals, and need for post-acute services based on payer coverage, community resources, and patient preferences, and barriers to discharge  - Address psychosocial, clinical, and financial barriers to discharge as identified in assessment in conjunction with the patient/family and health care team  - Arrange appropriate level of post-acute services according to patient's   needs and preference and payer coverage in collaboration with the physician and health care team  - Communicate with and update the patient/family, physician, and health care team regarding progress on the discharge plan  - Arrange appropriate transportation to post-acute venues  Return to home with dtrs   Outcome: Adequate for Discharge

## 2019-02-28 NOTE — NURSING NOTE
AVS and scripts reviewed in detail with pt and her daughter, Rena Captain  Both verbalize understanding and state no additional questions  Pure wick removed, output documented  PICC  Line removed by resident FIDE Flynn   Pt left for home via stretcher with ambulance transport team

## 2019-02-28 NOTE — PROGRESS NOTES
[address] [facility] Practice Progress Note - Nimesh Gamez 80 y o  female MRN: [id number]    Unit/Bed#: 2 [facility] 202 Encounter: [id number]      Assessment/Plan:    * Acute on chronic respiratory failure with hypoxia and hypercapnia ([address] )  Assessment & Plan  H/o chronic respiratory failure 2/2 muscular dystrophy vs obesity hypoventilation syndrome presents with SOB & hypoxia likely due to bronchitis  · Chest CT-  Right upper lobe infiltrate and left basilar consolidation concerning for multifocal pneumonia  Chronic right lower lobe and basilar infiltrates, perhaps fibrosis due to chronic aspiration with trace pleural effusion  Small left pleural effusion  Evidence of remote granulomatous disease  · Continue home BiPAP setting of 10/5 tonight   · Chest XR 2/25/19- Moderate to severe cardiomegaly with mild pulmonary vascular congestion with small right pleural effusion suggesting mild to moderate CHF  Right lower lobe lung opacity possibly related to atelectasis and/or pneumonia    · BNP 1996  · Continue Lasix 40mg PO Daily while in hospital, plan to discharge home on Lasix 20 mg p o   Daily  · Procalcitonin negative, no need for further antibiotic treatment at this time  · Currently on 2L O2 supplementation via NC    · WBC 10 98 this AM, otherwise labs stable   · Vitals stable  · Pt completed 4 days of Azithromycin   · Patient completed 3 treated course of Solu-Medrol  · Started prednisone 40 mg p o  Q d  · Continue mucomyst with Xopenex   · Chest PT protocol started  · Cough assist device ordered as per request of pulmonologist  · Influenza/RSV PCR, & urine strep/legionella negative   · Pulmonology consult placed and following, recommendations appreciated   · Continue to monitor closely     Essential hypertension  Assessment & Plan  · Stable   · 130/60 this AM   · ECHO 2/26/19: EF 55-60%  · As per Cardiology consult recommendations, will continue current medication regimen  · Continue Lasix 40mg PO Daily, Toprol XL 25mg PO BID & Cardizem 240mg PO qd  · Continue to monitor vitals q routine shift      Muscular dystrophy, 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  · Pt has been bed bound for > 20 years  · Continue to encourage moving to chair throughout the day   · Patient will be discharged to home, family refusing outpatient rehabilitation    Acute on chronic diastolic heart failure ([facility] [address] )  Assessment & Plan  ·  this a m  · Cardiac echo 2/26/19- EF 55-60%   · Chest CT 2/26/19- Right upper lobe infiltrate and left basilar consolidation concerning for multifocal pneumonia  Chronic right lower lobe and basilar infiltrates, perhaps fibrosis due to chronic aspiration with trace pleural effusion  Small left pleural effusion  Evidence of remote granulomatous disease  · CXR 2/25- moderate to severe cardiomegaly with mild pulmonary vascular congestion with small right pleural effusion suggesting mild-to-moderate CHF  Right lower lobe lung opacity possibly related to atelectasis and/or pneumonia    · Continue Lasix 40mg PO Daily while in hospital, plan to discharge on Lasix 20 mg p o   Daily   · Toprol XL 25mg BID, & Cardizem 240mg PO qd  · Cards recommendations appreciated     PORTIA treated with BiPAP  Assessment & Plan  · stable   · Continue BiPAP overnight, on home setting of 10/5   · Pt tolerated well    Hyponatremia  Assessment & Plan  · Resolved   · Na+ remained stable at 137   · Continue to monitor daily BMP    Generalized osteoarthritis  Assessment & Plan  · Chronic, Stable   · Continue Tylenol 650mg Q6H prn    Morbid obesity ([facility] [address] )  Assessment & Plan  · Limited activity due to MD   · Consult to Nutrition services placed   · Advise weight loss     Elevated liver enzymes  Assessment & Plan  · Likely due to infectious process   · Stable at this time   · AST 68  on 2/26  · Will continue to monitor as needed     Acute bronchitis  Assessment & Plan  · Continue treatment with Breo and Atrovent   · Continue O2 supplementation, currently on 3L NC   · Patient completed course of azithromycin  · No need for antibiotic therapy at this time  · Cough assist device as prescribed by pulmonology          Subjective:   Patient seen and examined at bedside  No acute events overnight  The patient currently receiving breathing treatment  States she is feeling better this morning  Patient had bowel movement yesterday, no longer constipated  Objective:     Vitals: Blood pressure 120/58, pulse 84, temperature 97 8 °F (36 6 °C), temperature source Oral, resp  rate 18, height 5' 7" (1 702 m), weight 103 kg (227 lb 1 2 oz), SpO2 93 %, not currently breastfeeding  ,Body mass index is 35 56 kg/m²    Wt Readings from Last 3 Encounters:   02/28/19 103 kg (227 lb 1 2 oz)   04/26/18 125 kg (275 lb)   07/29/16 89 kg (196 lb 3 4 oz)       Intake/Output Summary (Last 24 hours) at 2/28/2019 0655  Last data filed at 2/28/2019 0601  Gross per 24 hour   Intake 360 ml   Output 1900 ml   Net -1540 ml       Physical Exam: General appearance: alert  Lungs: Decreased breath sounds, bilateral rales  Heart: regular rate and rhythm  Abdomen: Soft, nontender, positive bowel sounds bilaterally, obese  Extremities: Edema of lower extremities bilaterally, chronic limb weakness     Recent Results (from the past 24 hour(s))   NT-BNP PRO    Collection Time: 02/28/19  4:50 AM   Result Value Ref Range    NT-proBNP 701 (H) <450 pg/mL   Basic metabolic panel    Collection Time: 02/28/19  4:50 AM   Result Value Ref Range    Sodium 137 136 - 145 mmol/L    Potassium 4 8 3 5 - 5 3 mmol/L    Chloride 93 (L) 100 - 108 mmol/L    CO2 44 (H) 21 - 32 mmol/L    ANION GAP 0 (L) 4 - 13 mmol/L    BUN 26 (H) 5 - 25 mg/dL    Creatinine 0 47 (L) 0 60 - 1 30 mg/dL    Glucose 155 (H) 65 - 140 mg/dL    Calcium 8 8 8 3 - 10 1 mg/dL    eGFR 89 ml/min/1 73sq m   CBC and differential    Collection Time: 02/28/19  4:50 AM   Result Value Ref Range    WBC 10 98 (H) 4 31 - 10 16 Thousand/uL    RBC 4 70 3 81 - 5 12 Million/uL    Hemoglobin 12 4 11 5 - 15 4 g/dL    Hematocrit 41 7 34 8 - 46 1 %    MCV 89 82 - 98 fL    MCH 26 4 (L) 26 8 - 34 3 pg    MCHC 29 7 (L) 31 4 - 37 4 g/dL    RDW 14 4 11 6 - 15 1 %    MPV 9 0 8 9 - 12 7 fL    Platelets 314 861 - 132 Thousands/uL   Phosphorus    Collection Time: 02/28/19  4:50 AM   Result Value Ref Range    Phosphorus 3 1 2 3 - 4 1 mg/dL   Magnesium    Collection Time: 02/28/19  4:50 AM   Result Value Ref Range    Magnesium 2 1 1 6 - 2 6 mg/dL       Current Facility-Administered Medications   Medication Dose Route Frequency Provider Last Rate Last Dose    acetaminophen (TYLENOL) tablet 650 mg  650 mg Oral Q6H PRN Lawrence Larson PA-C   650 mg at 02/27/19 2333    acetylcysteine (MUCOMYST) 200 mg/mL inhalation solution 600 mg  3 mL Nebulization TID Jo-Ann Serna MD   600 mg at 02/27/19 2047    benzonatate (TESSALON PERLES) capsule 100 mg  100 mg Oral TID PRN Lyubov Sánchez MD        celecoxib (CeleBREX) capsule 200 mg  200 mg Oral Daily Lyubov Sánchez MD   200 mg at 02/27/19 2563    diltiazem (CARDIZEM CD) 24 hr capsule 240 mg  240 mg Oral Daily Jennifer Knox PA-C   240 mg at 02/27/19 8332    docusate sodium (COLACE) capsule 100 mg  100 mg Oral BID Jennifer Alvarenga DO   100 mg at 02/27/19 1754    enoxaparin (LOVENOX) subcutaneous injection 40 mg  40 mg Subcutaneous Daily Jennifer Knox PA-C   40 mg at 02/27/19 0907    fluticasone-vilanterol (BREO ELLIPTA) 100-25 mcg/inh inhaler 1 puff  1 puff Inhalation Daily Jo-Ann Serna MD   1 puff at 02/27/19 0906    furosemide (LASIX) tablet 20 mg  20 mg Oral Daily Mary Mazariegos MD        guaiFENesin [facility]) 12 hr tablet 600 mg  600 mg Oral Q12H [address] Jo-Ann Serna MD   600 mg at 02/27/19 2028    ipratropium (ATROVENT) 0 02 % inhalation solution 0 5 mg  0 5 mg Nebulization TID Jo-Ann Serna MD   0 5 mg at 02/27/19 2047    levalbuterol (XOPENEX) inhalation solution 0 63 mg  0 63 mg Nebulization Q4H PRN Fabian White MD [place]   0 63 mg at 02/24/19 1150    levalbuterol (XOPENEX) inhalation solution 0 63 mg  0 63 mg Nebulization TID Payal Ontiveros MD   0 63 mg at 02/27/19 2047    metoprolol succinate (TOPROL-XL) 24 hr tablet 25 mg  25 mg Oral BID Sofie King PA-C   25 mg at 02/27/19 1754    nystatin (MYCOSTATIN) powder   Topical 4x Daily Jennifer Knox PA-C        perflutren lipid microsphere (DEFINITY) injection  0 75 mL/min Intravenous Once in imaging Bunny Lacey MD        predniSONE tablet 40 mg  40 mg Oral Daily Jennifer Alvarenga DO   40 mg at 02/27/19 2028    silver sulfadiazine (SILVADENE,SSD) 1 % cream   Topical Daily Sofie King PA-C           Invasive Devices     Peripherally Inserted Central Catheter Line            PICC Line 02/22/19 5 days          Drain            External Urinary Catheter 2 days                Lab, Imaging and other studies: I have personally reviewed pertinent reports      VTE Pharmacologic Prophylaxis: Enoxaparin (Lovenox)  VTE Mechanical Prophylaxis: sequential compression device    Jennifer Alvarenga DO

## 2019-03-01 DIAGNOSIS — R60.0 LOWER EXTREMITY EDEMA: ICD-10-CM

## 2019-03-01 RX ORDER — FUROSEMIDE 20 MG/1
20 TABLET ORAL DAILY
Qty: 90 TABLET | Refills: 3 | Status: SHIPPED | OUTPATIENT
Start: 2019-03-01 | End: 2020-02-26

## 2019-03-06 ENCOUNTER — TELEPHONE (OUTPATIENT)
Dept: FAMILY MEDICINE CLINIC | Facility: CLINIC | Age: 84
End: 2019-03-06

## 2019-03-06 ENCOUNTER — PATIENT OUTREACH (OUTPATIENT)
Dept: FAMILY MEDICINE CLINIC | Facility: CLINIC | Age: 84
End: 2019-03-06

## 2019-03-06 NOTE — PROGRESS NOTES
Called patients ibrahima Velez  Introduced self and role in managing patients care  CM contact information provided for call back  Returned daughters phone call  Left message to return call  Called and left message on patients mobile voicemail and work voicemail  CM contact information provided  Spoke with patients ibrahima Velez  Comprehensive Assessment completed  Currently family is primary caregiver for patient  Large family that manages all ADL's  [employer] PT/OT and skilled nursing doing home visits  Per daughter patient is bed bound  Needs home visit by Dr Екатерина Lebron  Not emergent  Will arrange home visit  Daughter has CM contact information

## 2019-03-08 ENCOUNTER — PATIENT OUTREACH (OUTPATIENT)
Dept: FAMILY MEDICINE CLINIC | Facility: CLINIC | Age: 84
End: 2019-03-08

## 2019-03-12 ENCOUNTER — TELEPHONE (OUTPATIENT)
Dept: FAMILY MEDICINE CLINIC | Facility: CLINIC | Age: 84
End: 2019-03-12

## 2019-03-12 NOTE — TELEPHONE ENCOUNTER
Veronica From [facility] requesting we resend fax that was sent over regarding [address] for Pt from Dr Tierra Culver  States there should be 6 pages in total, she she only received the 1st 4 pages        Fax [phone]

## 2019-03-26 NOTE — PROGRESS NOTES
Left VM for daughter advising that Dr Chelly Esquivel will be doing home visit tomorrow between 2 and 3 pm  Advised to call if this date and time is unacceptable

## 2019-03-27 ENCOUNTER — PATIENT OUTREACH (OUTPATIENT)
Dept: FAMILY MEDICINE CLINIC | Facility: CLINIC | Age: 84
End: 2019-03-27

## 2019-03-27 ENCOUNTER — TREATMENT (OUTPATIENT)
Dept: FAMILY MEDICINE CLINIC | Facility: CLINIC | Age: 84
End: 2019-03-27
Payer: MEDICARE

## 2019-03-27 DIAGNOSIS — Z79.899 MEDICATION MANAGEMENT: ICD-10-CM

## 2019-03-27 DIAGNOSIS — L98.421 SKIN ULCER OF SACRUM, LIMITED TO BREAKDOWN OF SKIN (HCC): ICD-10-CM

## 2019-03-27 DIAGNOSIS — G71.09 MUSCULAR DYSTROPHY, LIMB GIRDLE (HCC): Chronic | ICD-10-CM

## 2019-03-27 DIAGNOSIS — I50.32 CHRONIC DIASTOLIC CONGESTIVE HEART FAILURE (HCC): Primary | ICD-10-CM

## 2019-03-27 PROBLEM — I50.9 CHF (CONGESTIVE HEART FAILURE) (HCC): Status: ACTIVE | Noted: 2019-03-27

## 2019-03-27 PROCEDURE — 99349 HOME/RES VST EST MOD MDM 40: CPT | Performed by: FAMILY MEDICINE

## 2019-03-27 NOTE — PROGRESS NOTES
**Home Visit Note**    Assessment/Plan:    Diagnoses and all orders for this visit:    #1: Chronic diastolic congestive heart failure ([address] )  Echo on 2/26/19: EF: 55%-60%; PE showing clear lung examination without crackles and without lower extremity edema; Continue Torsemide 20mg PO qd    #2: Skin ulcer of sacrum, limited to breakdown of skin ([address] )  PE showing 2 area of skin denudation over sacral region; Local Wound Care; Frequent Repositioning; Daily Dressing Changes    #3: Muscular dystrophy, limb girdle  Bed Bound for >20years    #4: Medication management  Continue Lasix 20mg PO qd      Subjective:      Patient ID: Smitha Silverman is a 80 y o  female  HPI    80year old female with a medical history of Muscular Dystrophy, HTN, CHF, and PORTIA    Seen today on 3/27/19 for home visit  Patient is bed bound  Concerns Today: Medication Management - Concern for dosing of Lasix  Notes wounds on buttocks that are non-healing  Maintained on 2L NC during the day  Uses BiPAP at night  Denies visual changes, shortness of breath, chest pain, palpitations, abdominal pain, N/V/D, lower extremity numbness/tingling/loss of sensation  The following portions of the patient's history were reviewed and updated as appropriate: allergies, current medications, past family history, past medical history, past social history, past surgical history and problem list     Review of Systems   Constitutional: Negative for appetite change, chills, diaphoresis, fatigue and fever  Eyes: Negative for visual disturbance  Respiratory: Negative for apnea, chest tightness, shortness of breath and wheezing  Cardiovascular: Negative for chest pain, palpitations and leg swelling  Gastrointestinal: Negative for abdominal pain, constipation, diarrhea, nausea and vomiting  Endocrine: Negative for polydipsia, polyphagia and polyuria  Skin: Positive for wound  Negative for color change, pallor and rash     Neurological: Negative for dizziness, tremors, syncope, speech difficulty, weakness, light-headedness, numbness and headaches  Psychiatric/Behavioral: Negative for behavioral problems  All other systems reviewed and are negative  Objective:    SpO2: 92% via Pulse Oximetry     Physical Exam   Constitutional: She appears well-developed and well-nourished  No distress  Cardiovascular: Normal rate, regular rhythm, normal heart sounds and intact distal pulses  Exam reveals no gallop and no friction rub  No murmur heard  Pulmonary/Chest: Effort normal and breath sounds normal  No stridor  No respiratory distress  She has no wheezes  She has no rales  She exhibits no tenderness  Abdominal: Soft  Bowel sounds are normal  She exhibits no distension and no mass  There is no tenderness  There is no rebound and no guarding  No hernia  Musculoskeletal: She exhibits no edema or tenderness  Skin: No ecchymosis noted  She is not diaphoretic  There is erythema  Nursing note and vitals reviewed

## 2019-03-27 NOTE — PROGRESS NOTES
Met with patient and daughter during home visit with Dr Maureen Neely  Medications reviewed  Pt has all DME  No safety concerns in home  Upon examination Dr Maureen Neely noted stage 2 sacral decubiti  Dr Maureen Neely will write orders for optifoam dressings  Multiple pharmacies called for dressings  [facility], [facility], [facility], and [facility] do not have dressings  Spoke with daughter Isabelgemakiara Grijalva  Dressings are available on [employer]  OT is coming today  Will ask if they can order them through [facility] first  Dr Maureen Neely will sign order  Skilled nursing no longer doing home visits  Reviewed strategies to decrease pressure on sacral area including repositioning, using an inflatable ring (donut)  when sitting in chair  Explained dressing should be changed when soiled and prn  CM will continue to monitor and educate  F/U to inquire if optifoam dressings were obtained  Daughter states she ordered dressings on line at [employer]  Awaiting order to arrive

## 2019-04-17 ENCOUNTER — TELEPHONE (OUTPATIENT)
Dept: FAMILY MEDICINE CLINIC | Facility: CLINIC | Age: 84
End: 2019-04-17

## 2019-04-19 ENCOUNTER — PATIENT OUTREACH (OUTPATIENT)
Dept: FAMILY MEDICINE CLINIC | Facility: CLINIC | Age: 84
End: 2019-04-19

## 2019-04-25 ENCOUNTER — PATIENT OUTREACH (OUTPATIENT)
Dept: FAMILY MEDICINE CLINIC | Facility: CLINIC | Age: 84
End: 2019-04-25

## 2019-05-07 ENCOUNTER — PATIENT OUTREACH (OUTPATIENT)
Dept: FAMILY MEDICINE CLINIC | Facility: CLINIC | Age: 84
End: 2019-05-07

## 2019-06-03 ENCOUNTER — TELEPHONE (OUTPATIENT)
Dept: CARDIOLOGY CLINIC | Facility: CLINIC | Age: 84
End: 2019-06-03

## 2019-06-03 DIAGNOSIS — I10 ESSENTIAL HYPERTENSION: Primary | Chronic | ICD-10-CM

## 2019-06-03 RX ORDER — METOPROLOL SUCCINATE 25 MG/1
25 TABLET, EXTENDED RELEASE ORAL 3 TIMES DAILY
Qty: 270 TABLET | Refills: 3 | Status: SHIPPED | OUTPATIENT
Start: 2019-06-03 | End: 2019-06-03

## 2019-06-05 ENCOUNTER — TELEPHONE (OUTPATIENT)
Dept: PULMONOLOGY | Facility: MEDICAL CENTER | Age: 84
End: 2019-06-05

## 2019-06-05 DIAGNOSIS — J96.21 ACUTE ON CHRONIC RESPIRATORY FAILURE WITH HYPOXIA AND HYPERCAPNIA (HCC): Primary | ICD-10-CM

## 2019-06-05 DIAGNOSIS — J96.22 ACUTE ON CHRONIC RESPIRATORY FAILURE WITH HYPOXIA AND HYPERCAPNIA (HCC): Primary | ICD-10-CM

## 2019-06-25 ENCOUNTER — PATIENT OUTREACH (OUTPATIENT)
Dept: FAMILY MEDICINE CLINIC | Facility: CLINIC | Age: 84
End: 2019-06-25

## 2019-06-27 DIAGNOSIS — B37.2 CANDIDA INFECTION OF FLEXURAL SKIN: ICD-10-CM

## 2019-06-27 DIAGNOSIS — L30.8 OTHER ECZEMA: Primary | ICD-10-CM

## 2019-06-27 DIAGNOSIS — L98.491 SKIN ULCER, LIMITED TO BREAKDOWN OF SKIN (HCC): ICD-10-CM

## 2019-06-27 RX ORDER — NYSTATIN 100000 [USP'U]/G
POWDER TOPICAL 4 TIMES DAILY
Qty: 56.7 G | Refills: 3 | Status: SHIPPED | OUTPATIENT
Start: 2019-06-27 | End: 2020-02-11 | Stop reason: SDUPTHER

## 2019-07-02 DIAGNOSIS — L98.9 SKIN DISORDER: Primary | ICD-10-CM

## 2019-07-02 RX ORDER — TRIAMCINOLONE ACETONIDE 1 MG/G
CREAM TOPICAL 2 TIMES DAILY
Qty: 80 G | Refills: 5 | Status: SHIPPED | OUTPATIENT
Start: 2019-07-02 | End: 2021-10-28 | Stop reason: HOSPADM

## 2019-07-03 ENCOUNTER — PATIENT OUTREACH (OUTPATIENT)
Dept: FAMILY MEDICINE CLINIC | Facility: CLINIC | Age: 84
End: 2019-07-03

## 2019-07-03 NOTE — PROGRESS NOTES
CM received phone call from patients daughter stating that patient needs new jaye lift sling  Call placed to [facility] requesting specific information needed to order device  Information obtained  Spoke with daughter Bayron Ramon  Height 5'7" Weight 300 pounds  Rx completed with face sheet  Given to [place] to fax to [facility]

## 2019-07-23 ENCOUNTER — PATIENT OUTREACH (OUTPATIENT)
Dept: FAMILY MEDICINE CLINIC | Facility: CLINIC | Age: 84
End: 2019-07-23

## 2019-07-23 NOTE — PROGRESS NOTES
Received message from patients daughter that new sling for jaye lift has not been received yet  Call placed to [facility]  Recorded message regarding problems with phone line  Will try again later  Spoke with Louis PEPE  Denies receipt of order for sling  Requested that order be refaxed  Order given to Lisa Gustafson to refax to [facility]  Message left for daughter regarding above  Advised to call with any questions or concerns  Received phone call from patients daughter advising that jaye sling was never received  States sling that she has is ripping  Attempts to repair sling unsuccessful  [facility]  No record of latest fax documented  Called patients daughter and advised of above conversation  Encouraged daughter to call [facility] and express her concerns  Daughter also states that her mother needs  needs a home visit by Dr Fanny Gilman  States she needs a POC, but pulse ox testing needs to be done  Will discuss with Dr Fanny Gilman  Received phone call from patients daughter  Advised CM to refax original order to number [phone]  Original order given to Lisa Gustafson to refax to [facility]  Received message from [address] [place] [address] Shravan that PCP notes and another script need to be faxed to agency  Spoke with daughter Frank De La Cruz  Advised that she spoke to [facility]  Advised that no need to refax information  Sling is scheduled to be mailed out  Will reach out to Dr Fanny Gilman for home visit  Week of 8/13   Daughter will notify CM if sling does not arrive

## 2019-08-07 ENCOUNTER — PATIENT OUTREACH (OUTPATIENT)
Dept: FAMILY MEDICINE CLINIC | Facility: CLINIC | Age: 84
End: 2019-08-07

## 2019-08-07 NOTE — PROGRESS NOTES
Received phone call from Lady Alcantar at [address] [facility]  Requesting notes for continued oxygen use  Returned call and left message to return CM call  Received phone call from Lady Alcantar from [address]  Requested records that state patient is on continuous oxygen   Documents faxed to [phone]

## 2019-08-08 ENCOUNTER — TELEPHONE (OUTPATIENT)
Dept: FAMILY MEDICINE CLINIC | Facility: CLINIC | Age: 84
End: 2019-08-08

## 2019-08-30 ENCOUNTER — TELEPHONE (OUTPATIENT)
Dept: FAMILY MEDICINE CLINIC | Facility: CLINIC | Age: 84
End: 2019-08-30

## 2019-09-11 ENCOUNTER — PATIENT OUTREACH (OUTPATIENT)
Dept: FAMILY MEDICINE CLINIC | Facility: CLINIC | Age: 84
End: 2019-09-11

## 2019-09-11 NOTE — PROGRESS NOTES
Spoke with patients daughter  Requesting home visit by Dr Acacia Monge  Requesting pulse ox testing for portable oxygen and flu shot  Discussed with Dr Acacia Moneg  Will do home visit next week

## 2019-09-13 ENCOUNTER — PATIENT OUTREACH (OUTPATIENT)
Dept: FAMILY MEDICINE CLINIC | Facility: CLINIC | Age: 84
End: 2019-09-13

## 2019-09-13 NOTE — PROGRESS NOTES
Home visit with Dr Mimi Bradley scheduled for 9/18/19   Pt to receive flu vaccine and have oximetry testing to qualify for POC

## 2019-09-18 ENCOUNTER — IN HOME VISIT (OUTPATIENT)
Dept: FAMILY MEDICINE CLINIC | Facility: CLINIC | Age: 84
End: 2019-09-18
Payer: MEDICARE

## 2019-09-18 ENCOUNTER — PATIENT OUTREACH (OUTPATIENT)
Dept: FAMILY MEDICINE CLINIC | Facility: CLINIC | Age: 84
End: 2019-09-18

## 2019-09-18 VITALS — OXYGEN SATURATION: 99 % | HEART RATE: 82 BPM | DIASTOLIC BLOOD PRESSURE: 75 MMHG | SYSTOLIC BLOOD PRESSURE: 125 MMHG

## 2019-09-18 DIAGNOSIS — I10 ESSENTIAL HYPERTENSION: Chronic | ICD-10-CM

## 2019-09-18 DIAGNOSIS — J96.11 CHRONIC RESPIRATORY FAILURE WITH HYPOXIA (HCC): Primary | ICD-10-CM

## 2019-09-18 DIAGNOSIS — Z23 NEED FOR INFLUENZA VACCINATION: ICD-10-CM

## 2019-09-18 DIAGNOSIS — L91.8 CUTANEOUS SKIN TAGS: ICD-10-CM

## 2019-09-18 DIAGNOSIS — G71.09 MUSCULAR DYSTROPHY, LIMB GIRDLE (HCC): Chronic | ICD-10-CM

## 2019-09-18 DIAGNOSIS — M19.90 OSTEOARTHRITIS, UNSPECIFIED OSTEOARTHRITIS TYPE, UNSPECIFIED SITE: ICD-10-CM

## 2019-09-18 DIAGNOSIS — I50.32 CHRONIC DIASTOLIC CONGESTIVE HEART FAILURE (HCC): ICD-10-CM

## 2019-09-18 PROBLEM — J96.22 ACUTE ON CHRONIC RESPIRATORY FAILURE WITH HYPOXIA AND HYPERCAPNIA (HCC): Status: RESOLVED | Noted: 2019-02-22 | Resolved: 2019-09-18

## 2019-09-18 PROBLEM — I50.33 ACUTE ON CHRONIC DIASTOLIC HEART FAILURE (HCC): Status: RESOLVED | Noted: 2019-02-22 | Resolved: 2019-09-18

## 2019-09-18 PROBLEM — J96.21 ACUTE ON CHRONIC RESPIRATORY FAILURE WITH HYPOXIA AND HYPERCAPNIA (HCC): Status: RESOLVED | Noted: 2019-02-22 | Resolved: 2019-09-18

## 2019-09-18 PROCEDURE — G0008 ADMIN INFLUENZA VIRUS VAC: HCPCS | Performed by: FAMILY MEDICINE

## 2019-09-18 PROCEDURE — 99349 HOME/RES VST EST MOD MDM 40: CPT | Performed by: FAMILY MEDICINE

## 2019-09-18 PROCEDURE — 90662 IIV NO PRSV INCREASED AG IM: CPT | Performed by: FAMILY MEDICINE

## 2019-09-18 RX ORDER — CELECOXIB 200 MG/1
200 CAPSULE ORAL DAILY
Qty: 30 CAPSULE | Refills: 3 | Status: SHIPPED | OUTPATIENT
Start: 2019-09-18 | End: 2019-10-21 | Stop reason: SDUPTHER

## 2019-09-18 NOTE — PROGRESS NOTES
Home visit done with Dr Daniel Paniagua and Dr Fei Dixon  All of families questions and concerns addressed  Lasix 20 mg to be taken every other day  Daughter advised to call CM if any SOB, elevated BP or leg edema  Pulse oximetry testing done to see if patient qualifies for POC, which the family is requesting  Dr Fei Dixon will document outcome in notes  Paperwork for [employer] company to be completed and sent to [address] [place] [address] flu vaccine  Celebrex to be reordered by Dr Daniel Paniagua  [employer] forms completed, however they needed additional information and signatures of patient  Documents given to patients daughter Shannan Osorio to complete and mail to [employer]

## 2019-09-18 NOTE — PROGRESS NOTES
Assessment/Plan:      Diagnoses and all orders for this visit:    Chronic respiratory failure with hypoxia (HCC)  Measured SpO2, with oxygen it is 99%  After a few minutes w/o oxygen, SpO2 dropped to 87%  Patient will require portable O2 machine  Order will be placed  Need for influenza vaccination  -     FLUZONE HIGH-DOSE: influenza vaccine, high-dose, preservative-free 0 5 mL    Muscular dystrophy, limb girdle  Stable  Continue with current management  Chronic diastolic congestive heart failure (HCC)  Stable  Lasix will be adjusted to every other day instead of every day  Recommended to call back and let us know if blood pressure starts to increase, shortness of breath, LE edema develops  Essential hypertension  Stable, continue with current management with cardizem 240 mg qd, lopressor 25 mg q12H, and lasix 20 mg every other day  Cutaneous skin tag  Explained to patient secondary to aging  If it becomes itchy will consider procedure to remove them  Ample time given during visit to answer all questions  Recommended to call back if any issues arise  Subjective:     Patient ID: Ricardo Manriquez is a 80 y o  female  House visit for 79 y/o F with several chronic conditions  Per daughters, patient is able to perform certain ADLs on her own, however they are concerned, as she desaturates when taken off of supplemental O2  Patient requesting to recheck "water pill" as she feels she doesn't need it as often  States BP has been stable  Patient also concerned about "things in her neck", she noticed them a while ago and wants to make sure it is not malignant  Review of Systems   Constitutional: Negative for chills and fever  Respiratory: Negative for shortness of breath  Cardiovascular: Negative for chest pain and palpitations  Gastrointestinal: Negative for abdominal pain and nausea  Genitourinary: Negative for dysuria           Objective:  /75   Pulse 82   SpO2 99% Physical Exam   Constitutional: She is oriented to person, place, and time  She appears well-developed and well-nourished  No distress  HENT:   Head: Normocephalic and atraumatic  Eyes: Conjunctivae and EOM are normal    Cardiovascular: Normal rate and regular rhythm  Pulmonary/Chest: Effort normal and breath sounds normal    Abdominal: Soft  Bowel sounds are normal  She exhibits no distension  There is no tenderness  Neurological: She is alert and oriented to person, place, and time  Skin:   Right lateral area of neck with <1cm skin colored growths, raised  Vitals reviewed

## 2019-09-24 ENCOUNTER — PATIENT OUTREACH (OUTPATIENT)
Dept: FAMILY MEDICINE CLINIC | Facility: CLINIC | Age: 84
End: 2019-09-24

## 2019-09-24 DIAGNOSIS — R09.02 HYPOXIA: Primary | ICD-10-CM

## 2019-09-24 NOTE — PROGRESS NOTES
Order for portable oxygen concentrator, supporting documentation and face sheet faxed to [facility]

## 2019-09-25 DIAGNOSIS — R09.02 HYPOXIA: ICD-10-CM

## 2019-09-25 DIAGNOSIS — G47.33 OSA TREATED WITH BIPAP: Primary | Chronic | ICD-10-CM

## 2019-10-18 ENCOUNTER — PATIENT OUTREACH (OUTPATIENT)
Dept: FAMILY MEDICINE CLINIC | Facility: CLINIC | Age: 84
End: 2019-10-18

## 2019-10-21 ENCOUNTER — PATIENT OUTREACH (OUTPATIENT)
Dept: FAMILY MEDICINE CLINIC | Facility: CLINIC | Age: 84
End: 2019-10-21

## 2019-10-21 DIAGNOSIS — M19.90 OSTEOARTHRITIS, UNSPECIFIED OSTEOARTHRITIS TYPE, UNSPECIFIED SITE: ICD-10-CM

## 2019-10-21 RX ORDER — CELECOXIB 200 MG/1
200 CAPSULE ORAL DAILY
Qty: 90 CAPSULE | Refills: 3 | Status: SHIPPED | OUTPATIENT
Start: 2019-10-21 | End: 2020-09-16 | Stop reason: SDUPTHER

## 2019-10-21 NOTE — PROGRESS NOTES
Returned daughters phone call advising that celebrex has been reordered for 90 day supply by Dr Angel Prasad  Received phone call from patients daughter Debbi Moreno  Advised prescription for 90 day supply was not available at pharmacy  [facility] notified  Order changed for celebrex 200mg po daily for 90 days

## 2019-11-12 ENCOUNTER — PATIENT OUTREACH (OUTPATIENT)
Dept: FAMILY MEDICINE CLINIC | Facility: CLINIC | Age: 84
End: 2019-11-12

## 2019-11-12 NOTE — PROGRESS NOTES
Returned daughters phone call  States she has not received the portable oxygen concentrator yet  CM spoke with Rayne Garrett from [facility] on 11/11/19  Home visit notes require additional information  Once information is added to notes, CM will refax to [facility]  Daughter advised of above  Dr Katherin Shone notified to addend notes  Addended notes faxed to [facility]

## 2019-11-20 ENCOUNTER — TELEPHONE (OUTPATIENT)
Dept: OTHER | Facility: OTHER | Age: 84
End: 2019-11-20

## 2019-11-21 ENCOUNTER — PATIENT OUTREACH (OUTPATIENT)
Dept: FAMILY MEDICINE CLINIC | Facility: CLINIC | Age: 84
End: 2019-11-21

## 2019-11-21 NOTE — PROGRESS NOTES
Received phone call from daughter May Dubois  Advises that documentation is still not correct to receive POC  Advised to call [facility]  Call placed to Ralph Greene at [facility]  Advised that updated documentation with oxygen saturation assessments is needed to process order  Addended office notes faxed to [facility]

## 2019-12-05 ENCOUNTER — TELEPHONE (OUTPATIENT)
Dept: FAMILY MEDICINE CLINIC | Facility: CLINIC | Age: 84
End: 2019-12-05

## 2019-12-09 NOTE — TELEPHONE ENCOUNTER
CHIEF COMPLAINT:    Chief Complaint   Patient presents with   • Edema     BILATERAL LOWER EXTREMITY EDEMA; L>R   • Weight Gain     24# WEIGHT GAIN IN 6 WEEKS   • Shortness of Breath   • Heart Problem     IRREGULAR HEART RATE; ATRIAL FIBRILLATION       History:      Jens Paige is a 82 year old male who presents to office with severe deterioration in his health over the last few days.  Patient states that he is extremely short of breath and has been using his inhalers regularly however is not helping.  Patient states that he has been taking his diuretics regularly however has put on lot of weight and is getting severe lower extremity edema.  Patient appear distressed and could not walk well when arrived to the clinic.      PAST MEDICAL HISTORY:      CAD (coronary artery disease)                                 Mild aortic stenosis                                          HTN (hypertension)                                            Obesity                                                       DJD (degenerative joint disease)                              AAA (abdominal aortic aneurysm) (CMS/[facility])                     Hyperlipidemia                                                Gout                                                          Spondylosis                                                   Erythema                                                        Comment: dyshidrotic    IBS (irritable bowel syndrome)                                CAD (coronary artery disease)                   1998            Comment: RCA STENT    Mild aortic stenosis                                          Hypertension                                                  Disorder of bone and cartilage, unspecified     09/22/2011    Allergy                                                       Diabetes mellitus (CMS/[facility])                                     Comment: TRYS TO WATCH HIS DIET    DM2 (diabetes mellitus, type 2) (CMS/[facility])  Telma Tidwell from [facility] called to let Dr Arvin Aguilar know that he is discharged pt from occupational therapy as of today but she is still taking PT                     Urinary incontinence                                          Diverticulitis                                  10/2014       LYNDA (obstructive sleep apnea)                                   Comment: NOT ON CPAP    [place]                                                     MEDICATIONS:    Current Outpatient Medications   Medication Sig Dispense Refill   • tamsulosin (FLOMAX) 0.4 MG Cap Take 1 capsule by mouth daily after a meal. 30 capsule 5   • albuterol 108 (90 Base) MCG/ACT inhaler Inhale 2 puffs into the lungs Every 4 hours as needed (EVERY 4 HOURS RESPIRATORY PRN). 8.5 g 4   • omeprazole (PRILOSEC) 20 MG capsule Take 1 capsule by mouth daily. 90 capsule 3   • fexofenadine (ALLEGRA) 180 MG tablet Take 1 tablet by mouth daily. 90 tablet 1   • apixaBAN (ELIQUIS) 5 MG Tab Take 1 tablet by mouth 2 times daily. 60 tablet 6   • ciprofloxacin (CIPRO) 500 MG tablet Take 1 tablet by mouth 2 times daily. 14 tablet 0   • finasteride (PROSCAR) 5 MG tablet Take 1 tablet by mouth daily. 90 tablet 3   • polyethylene glycol (GLYCOLAX, MIRALAX) packet Take 17 g by mouth daily.     • carvedilol (COREG) 12.5 MG tablet Take one tablet by mouth in the morning and one-half tablet by mouth in the evening 60 tablet 11   • CRESTOR 10 MG tablet Take one tablet by mouth on Monday Wednesday and Friday. Take one-half tablet by mouth on all other days 45 tablet 11   • furosemide (LASIX) 40 MG tablet Take two tablets by mouth on Monday, Wednesday, and Friday. Take one tablet by mouth daily on all other days 45 tablet 11   • fluticasone (FLONASE) 50 MCG/ACT nasal spray Spray 2 sprays in each nostril daily. 16 g 5   • blood glucose (ACCU-CHEK MARTINEZ PLUS) test strip TEST GLUCOSE LEVEL ONCE DAILY 100 each 5   • potassium chloride (K-TAB) 20 MEQ ER tablet Take 1 tablet by mouth daily. (Patient taking differently: Take 20 mEq by mouth daily. Patient taking 1 tablet every Monday, Wednesday and Friday) 30 tablet 3   • Lancets  (ACCU-CHEK MULTICLIX) [facility] CHECK GLUCOSE LEVEL ONCE DAILY 100 each 3   • Blood Glucose Monitoring Suppl (ACCU-CHEK MARTINEZ PLUS) w/Device Kit Use as directed to test blood sugars 1 kit 0   • EPINEPHrine 0.3 MG/0.3ML auto-injector Inject 0.3 mLs into the muscle once as needed for Anaphylaxis. 2 each 1   • Cholecalciferol (VITAMIN D) 2000 UNITS capsule Take 1 capsule by mouth daily. 30 capsule    • Coenzyme Q-10 200 MG Cap Take 1 capsule by mouth daily.     • DISPENSE #20 condom catheters, 3 leg bag kits. Condom caths to be changed daily, prn. Leg bags changed every 1-2 weeks, prn.  Lifetime need. 20 each 99   • DISPENSE Accu-Check Martinez Strips - Test blood sugars once daily or as directed - DX Diabetes DX Code 250.00 100 strip 0   • fexofenadine (ALLEGRA) 180 MG tablet Take 1 tablet by mouth daily. 30 tablet 5     No current facility-administered medications for this visit.        ALLERGIES:    ALLERGIES:   Allergen Reactions   • Ace Inhibitors      angioedema   • Bee Sting ANAPHYLAXIS   • Lipitor [Atorvastatin Calcium] MYALGIA   • Norvasc [Amlodipine Besylate] RASH   • Procardia [Nifedipine] SWELLING   • Statins MYALGIA   • Tekturna [Aliskiren Fumarate]      angioedema   • Zithromax [Azithromycin] Other (See Comments)     UNKNOWN           Review of systems:      Constitutional:  Patient denies fever, chills, tiredness or malaise.    Eyes:  Denies change in visual acuity, pain, burning or itching.    Immunologic:  Denies hives, seasonal allergies.    Head, Ears, Nose, Throat:  Denies sinus problems, ear infections, nasal congestion or sore throat.    Respiratory:  Severe shortness of breath  Cardiovascular:  Denies chest pain, edema.    Gastrointestinal:  Denies abdominal pain, nausea, vomiting, bloody stools or diarrhea.    Genitourinary:  Denies urine retention, painful urination, urinary frequency, blood in urine or nocturia.    All other systems reviewed and negative      Physical ExamINATION:   Vital Signs:     Vitals:    12/09/19 1414   BP: 120/80   Pulse: 120   Resp: 20   Temp: 97.5 °F (36.4 °C)   SpO2: (!) 86%   Weight: (!) 146.5 kg   Height: 5' 11\" (1.803 m)     Constitutional:  Patient appears short of breath and distressed.  Integument:  Warm. Dry. No erythema. No rash.    Head, Ears, Nose, Throat:  Normocephalic. Atraumatic. Bilateral external ears normal. Oropharynx moist. No oral exudates. Nose normal.   Neck: Normal range of motion. No tenderness. Supple. No stridor.    Eyes:  PERRL(Pupils equal, round, reactive to light), EOMI(Extraocular movements intact). Conjunctivae normal. No discharge.    Cardiovascular:  Normal heart rate. Normal rhythm. No murmurs. No rubs. No gallops.    Respiratory:  Bilateral inspiratory and expiratory wheezes and some scattered crackles bilaterally  Severe peripheral leg edema bilaterally.        ASSESSMENT AND PLAN:      1. Biventricular congestive heart failure (CMS/HCC)  Patient appears to be in severe congestive heart failure.  He has put on 25 lb in weight and was hypoxemic with oxygen saturation of 84-85%.  Patient has states that he has been taking his diuretics regularly.  After initial evaluation I have recommended that the patient go to emergency room and will probably need admission to the hospital and intravenous diuretics.  I feel that outpatient treatment for acute on chronic congestive heart failure is not going to be effective.  - PULSE OXIMETRY SINGLE    2. Chronic atrial fibrillation  Patient had a tachycardia with a ventricular rate of 120 per minute.  He has been taking all his medications and also anticoagulated.    3. Weight gain  Patient has put on 25 lb in weight which is probably all due to congestive heart failure and will need intravenous diuretics and management inpatient.  - PULSE OXIMETRY SINGLE    4. Coronary artery disease involving native coronary artery of native heart without angina pectoris  Patient denies having any chest pain recently however  known to have coronary artery disease.            Orders Placed This Encounter   • PULSE OXIMETRY SINGLE       No follow-ups on file.    Miki Chavis MD

## 2020-01-01 ENCOUNTER — TELEPHONE (OUTPATIENT)
Dept: FAMILY MEDICINE CLINIC | Facility: CLINIC | Age: 85
End: 2020-01-01

## 2020-01-01 ENCOUNTER — PATIENT OUTREACH (OUTPATIENT)
Dept: FAMILY MEDICINE CLINIC | Facility: CLINIC | Age: 85
End: 2020-01-01

## 2020-01-01 DIAGNOSIS — L98.491 SKIN ULCER, LIMITED TO BREAKDOWN OF SKIN (HCC): ICD-10-CM

## 2020-01-13 DIAGNOSIS — R06.02 SHORTNESS OF BREATH: ICD-10-CM

## 2020-01-13 DIAGNOSIS — J41.0 SIMPLE CHRONIC BRONCHITIS (HCC): Primary | ICD-10-CM

## 2020-01-13 RX ORDER — IPRATROPIUM BROMIDE AND ALBUTEROL SULFATE 2.5; .5 MG/3ML; MG/3ML
3 SOLUTION RESPIRATORY (INHALATION)
Qty: 120 VIAL | Refills: 6 | Status: SHIPPED | OUTPATIENT
Start: 2020-01-13 | End: 2021-01-01 | Stop reason: SDUPTHER

## 2020-01-23 ENCOUNTER — HOSPITAL ENCOUNTER (INPATIENT)
Facility: HOSPITAL | Age: 85
LOS: 1 days | Discharge: HOME | DRG: 205 | End: 2020-01-25
Attending: EMERGENCY MEDICINE | Admitting: FAMILY MEDICINE
Payer: MEDICARE

## 2020-01-23 ENCOUNTER — APPOINTMENT (EMERGENCY)
Dept: RADIOLOGY | Facility: HOSPITAL | Age: 85
DRG: 205 | End: 2020-01-23
Payer: MEDICARE

## 2020-01-23 DIAGNOSIS — N39.0 UTI (URINARY TRACT INFECTION): ICD-10-CM

## 2020-01-23 DIAGNOSIS — L98.429 SKIN ULCER OF SACRUM, UNSPECIFIED ULCER STAGE (HCC): ICD-10-CM

## 2020-01-23 DIAGNOSIS — E55.9 VITAMIN D DEFICIENCY: ICD-10-CM

## 2020-01-23 DIAGNOSIS — R41.82 ALTERED MENTAL STATUS: Primary | ICD-10-CM

## 2020-01-23 DIAGNOSIS — R06.02 SHORTNESS OF BREATH: ICD-10-CM

## 2020-01-23 DIAGNOSIS — J96.10 CHRONIC RESPIRATORY FAILURE (HCC): ICD-10-CM

## 2020-01-23 DIAGNOSIS — R06.89 HYPERCARBIA: ICD-10-CM

## 2020-01-23 PROBLEM — I50.30 DIASTOLIC CONGESTIVE HEART FAILURE (HCC): Status: ACTIVE | Noted: 2020-01-23

## 2020-01-23 LAB
ALBUMIN SERPL BCP-MCNC: 3.1 G/DL (ref 3.5–5)
ALP SERPL-CCNC: 118 U/L (ref 46–116)
ALT SERPL W P-5'-P-CCNC: 19 U/L (ref 12–78)
AMORPH URATE CRY URNS QL MICRO: ABNORMAL /HPF
ANION GAP SERPL CALCULATED.3IONS-SCNC: 4 MMOL/L (ref 4–13)
APTT PPP: 30 SECONDS (ref 25–32)
ARTERIAL PATENCY WRIST A: YES
AST SERPL W P-5'-P-CCNC: 18 U/L (ref 5–45)
BACTERIA UR QL AUTO: ABNORMAL /HPF
BASE EXCESS BLDA CALC-SCNC: 10.3 MMOL/L
BASOPHILS # BLD AUTO: 0.05 THOUSANDS/ΜL (ref 0–0.1)
BASOPHILS NFR BLD AUTO: 0 % (ref 0–1)
BILIRUB SERPL-MCNC: 0.1 MG/DL (ref 0.2–1)
BILIRUB UR QL STRIP: NEGATIVE
BODY TEMPERATURE: 98.5 DEGREES FEHRENHEIT
BUN SERPL-MCNC: 15 MG/DL (ref 5–25)
CALCIUM SERPL-MCNC: 9.4 MG/DL (ref 8.3–10.1)
CHLORIDE SERPL-SCNC: 99 MMOL/L (ref 100–108)
CLARITY UR: ABNORMAL
CO2 SERPL-SCNC: 38 MMOL/L (ref 21–32)
COLOR UR: ABNORMAL
CREAT SERPL-MCNC: 0.3 MG/DL (ref 0.6–1.3)
EOSINOPHIL # BLD AUTO: 0 THOUSAND/ΜL (ref 0–0.61)
EOSINOPHIL NFR BLD AUTO: 0 % (ref 0–6)
ERYTHROCYTE [DISTWIDTH] IN BLOOD BY AUTOMATED COUNT: 14.9 % (ref 11.6–15.1)
FLUAV RNA NPH QL NAA+PROBE: NORMAL
FLUBV RNA NPH QL NAA+PROBE: NORMAL
GFR SERPL CREATININE-BSD FRML MDRD: 103 ML/MIN/1.73SQ M
GLUCOSE SERPL-MCNC: 105 MG/DL (ref 65–140)
GLUCOSE UR STRIP-MCNC: NEGATIVE MG/DL
HCO3 BLDA-SCNC: 37.4 MMOL/L (ref 22–28)
HCT VFR BLD AUTO: 39.1 % (ref 34.8–46.1)
HGB BLD-MCNC: 11.7 G/DL (ref 11.5–15.4)
HGB UR QL STRIP.AUTO: NEGATIVE
IMM GRANULOCYTES # BLD AUTO: 0.03 THOUSAND/UL (ref 0–0.2)
IMM GRANULOCYTES NFR BLD AUTO: 0 % (ref 0–2)
INR PPP: 0.94 (ref 0.91–1.09)
KETONES UR STRIP-MCNC: NEGATIVE MG/DL
LEUKOCYTE ESTERASE UR QL STRIP: ABNORMAL
LYMPHOCYTES # BLD AUTO: 1.7 THOUSANDS/ΜL (ref 0.6–4.47)
LYMPHOCYTES NFR BLD AUTO: 15 % (ref 14–44)
MCH RBC QN AUTO: 26.3 PG (ref 26.8–34.3)
MCHC RBC AUTO-ENTMCNC: 29.9 G/DL (ref 31.4–37.4)
MCV RBC AUTO: 88 FL (ref 82–98)
MONOCYTES # BLD AUTO: 0.66 THOUSAND/ΜL (ref 0.17–1.22)
MONOCYTES NFR BLD AUTO: 6 % (ref 4–12)
MUCOUS THREADS UR QL AUTO: ABNORMAL
NASAL CANNULA: ABNORMAL
NEUTROPHILS # BLD AUTO: 8.8 THOUSANDS/ΜL (ref 1.85–7.62)
NEUTS SEG NFR BLD AUTO: 79 % (ref 43–75)
NITRITE UR QL STRIP: POSITIVE
NON-SQ EPI CELLS URNS QL MICRO: ABNORMAL /HPF
NRBC BLD AUTO-RTO: 0 /100 WBCS
NT-PROBNP SERPL-MCNC: 177 PG/ML
O2 CT BLDA-SCNC: 16.7 ML/DL (ref 16–23)
OXYHGB MFR BLDA: 97 % (ref 94–97)
PCO2 BLDA: 62.8 MM HG (ref 36–44)
PCO2 TEMP ADJ BLDA: 62.5 MM HG (ref 36–44)
PH BLD: 7.39 [PH] (ref 7.35–7.45)
PH BLDA: 7.39 [PH] (ref 7.35–7.45)
PH UR STRIP.AUTO: 7.5 [PH]
PLATELET # BLD AUTO: 288 THOUSANDS/UL (ref 149–390)
PMV BLD AUTO: 9.6 FL (ref 8.9–12.7)
PO2 BLD: 109.5 MM HG (ref 75–129)
PO2 BLDA: 110.1 MM HG (ref 75–129)
POTASSIUM SERPL-SCNC: 4.4 MMOL/L (ref 3.5–5.3)
PROT SERPL-MCNC: 7.1 G/DL (ref 6.4–8.2)
PROT UR STRIP-MCNC: NEGATIVE MG/DL
PROTHROMBIN TIME: 10.1 SECONDS (ref 9.8–12)
RBC # BLD AUTO: 4.45 MILLION/UL (ref 3.81–5.12)
RBC #/AREA URNS AUTO: ABNORMAL /HPF
RSV RNA NPH QL NAA+PROBE: NORMAL
SODIUM SERPL-SCNC: 141 MMOL/L (ref 136–145)
SP GR UR STRIP.AUTO: <=1.005 (ref 1–1.03)
SPECIMEN SOURCE: ABNORMAL
TROPONIN I SERPL-MCNC: <0.02 NG/ML
UROBILINOGEN UR QL STRIP.AUTO: 0.2 E.U./DL
WBC # BLD AUTO: 11.24 THOUSAND/UL (ref 4.31–10.16)
WBC #/AREA URNS AUTO: ABNORMAL /HPF

## 2020-01-23 PROCEDURE — 80053 COMPREHEN METABOLIC PANEL: CPT | Performed by: EMERGENCY MEDICINE

## 2020-01-23 PROCEDURE — 87631 RESP VIRUS 3-5 TARGETS: CPT | Performed by: EMERGENCY MEDICINE

## 2020-01-23 PROCEDURE — 85025 COMPLETE CBC W/AUTO DIFF WBC: CPT | Performed by: EMERGENCY MEDICINE

## 2020-01-23 PROCEDURE — 93005 ELECTROCARDIOGRAM TRACING: CPT

## 2020-01-23 PROCEDURE — 94760 N-INVAS EAR/PLS OXIMETRY 1: CPT

## 2020-01-23 PROCEDURE — 36415 COLL VENOUS BLD VENIPUNCTURE: CPT | Performed by: EMERGENCY MEDICINE

## 2020-01-23 PROCEDURE — 1123F ACP DISCUSS/DSCN MKR DOCD: CPT | Performed by: FAMILY MEDICINE

## 2020-01-23 PROCEDURE — 85610 PROTHROMBIN TIME: CPT | Performed by: EMERGENCY MEDICINE

## 2020-01-23 PROCEDURE — 82805 BLOOD GASES W/O2 SATURATION: CPT | Performed by: EMERGENCY MEDICINE

## 2020-01-23 PROCEDURE — 81001 URINALYSIS AUTO W/SCOPE: CPT | Performed by: EMERGENCY MEDICINE

## 2020-01-23 PROCEDURE — 99285 EMERGENCY DEPT VISIT HI MDM: CPT | Performed by: EMERGENCY MEDICINE

## 2020-01-23 PROCEDURE — 36600 WITHDRAWAL OF ARTERIAL BLOOD: CPT

## 2020-01-23 PROCEDURE — 83880 ASSAY OF NATRIURETIC PEPTIDE: CPT | Performed by: STUDENT IN AN ORGANIZED HEALTH CARE EDUCATION/TRAINING PROGRAM

## 2020-01-23 PROCEDURE — 99285 EMERGENCY DEPT VISIT HI MDM: CPT

## 2020-01-23 PROCEDURE — 87086 URINE CULTURE/COLONY COUNT: CPT | Performed by: STUDENT IN AN ORGANIZED HEALTH CARE EDUCATION/TRAINING PROGRAM

## 2020-01-23 PROCEDURE — 94660 CPAP INITIATION&MGMT: CPT

## 2020-01-23 PROCEDURE — 96365 THER/PROPH/DIAG IV INF INIT: CPT

## 2020-01-23 PROCEDURE — 84484 ASSAY OF TROPONIN QUANT: CPT | Performed by: EMERGENCY MEDICINE

## 2020-01-23 PROCEDURE — 71045 X-RAY EXAM CHEST 1 VIEW: CPT

## 2020-01-23 PROCEDURE — 85730 THROMBOPLASTIN TIME PARTIAL: CPT | Performed by: EMERGENCY MEDICINE

## 2020-01-23 PROCEDURE — 94640 AIRWAY INHALATION TREATMENT: CPT

## 2020-01-23 RX ORDER — DILTIAZEM HYDROCHLORIDE 240 MG/1
240 CAPSULE, COATED, EXTENDED RELEASE ORAL DAILY
Status: DISCONTINUED | OUTPATIENT
Start: 2020-01-24 | End: 2020-01-25 | Stop reason: HOSPADM

## 2020-01-23 RX ORDER — FLUTICASONE FUROATE AND VILANTEROL 100; 25 UG/1; UG/1
1 POWDER RESPIRATORY (INHALATION) DAILY
Status: DISCONTINUED | OUTPATIENT
Start: 2020-01-24 | End: 2020-01-25 | Stop reason: HOSPADM

## 2020-01-23 RX ORDER — CEFTRIAXONE 1 G/50ML
1000 INJECTION, SOLUTION INTRAVENOUS ONCE
Status: COMPLETED | OUTPATIENT
Start: 2020-01-23 | End: 2020-01-23

## 2020-01-23 RX ORDER — FUROSEMIDE 20 MG/1
20 TABLET ORAL DAILY
Status: DISCONTINUED | OUTPATIENT
Start: 2020-01-24 | End: 2020-01-23

## 2020-01-23 RX ORDER — CEFTRIAXONE 1 G/50ML
1000 INJECTION, SOLUTION INTRAVENOUS EVERY 24 HOURS
Status: DISCONTINUED | OUTPATIENT
Start: 2020-01-24 | End: 2020-01-25 | Stop reason: HOSPADM

## 2020-01-23 RX ORDER — CELECOXIB 100 MG/1
200 CAPSULE ORAL DAILY
Status: DISCONTINUED | OUTPATIENT
Start: 2020-01-24 | End: 2020-01-25 | Stop reason: HOSPADM

## 2020-01-23 RX ORDER — FUROSEMIDE 10 MG/ML
40 INJECTION INTRAMUSCULAR; INTRAVENOUS ONCE
Status: COMPLETED | OUTPATIENT
Start: 2020-01-23 | End: 2020-01-23

## 2020-01-23 RX ORDER — IPRATROPIUM BROMIDE AND ALBUTEROL SULFATE 2.5; .5 MG/3ML; MG/3ML
3 SOLUTION RESPIRATORY (INHALATION)
Status: DISCONTINUED | OUTPATIENT
Start: 2020-01-23 | End: 2020-01-25 | Stop reason: HOSPADM

## 2020-01-23 RX ADMIN — IPRATROPIUM BROMIDE AND ALBUTEROL SULFATE 3 ML: 2.5; .5 SOLUTION RESPIRATORY (INHALATION) at 21:26

## 2020-01-23 RX ADMIN — FUROSEMIDE 40 MG: 10 INJECTION, SOLUTION INTRAMUSCULAR; INTRAVENOUS at 22:46

## 2020-01-23 RX ADMIN — CEFTRIAXONE 1000 MG: 1 INJECTION, SOLUTION INTRAVENOUS at 19:34

## 2020-01-23 RX ADMIN — METOPROLOL TARTRATE 25 MG: 25 TABLET ORAL at 22:46

## 2020-01-23 NOTE — ED PROVIDER NOTES
History  Chief Complaint   Patient presents with    Shortness of Breath     pt daughter called 911 for SOB and auditory/visual hallucinations which dtr reports normally happens when oxygen levels drop     79 yo female sent in by her daughter who says she was not acting herself and that she was confused and hallucinating  Daughter says that usually means that her CO2 is out of whack  Pt  Has had a slight cold/congestion x the past week  No fever  No vomiting or diarrhea  No chest pain  Is on O2 2L all the time and CPAP at night  Pt  Is non-ambulatory  History provided by:  Patient   used: No        Prior to Admission Medications   Prescriptions Last Dose Informant Patient Reported? Taking?   acetaminophen (TYLENOL) 325 mg tablet   No No   Sig: Take one or 2 every 6 hours as needed for pain   albuterol (ACCUNEB) 0 63 MG/3ML nebulizer solution   No No   Sig: Take 3 mL (0 63 mg total) by nebulization every 6 (six) hours as needed for wheezing or shortness of breath   celecoxib (CeleBREX) 200 mg capsule   No No   Sig: Take 1 capsule (200 mg total) by mouth daily   diltiazem (CARDIZEM CD) 240 mg 24 hr capsule   No No   Sig: Take 1 capsule (240 mg total) by mouth daily   fluocinonide (LIDEX) 0 05 % cream   No No   Sig: Apply topically 2 (two) times a day   fluticasone-vilanterol (BREO ELLIPTA) 100-25 mcg/inh inhaler   No No   Sig: Inhale 1 puff daily Rinse mouth after use     furosemide (LASIX) 20 mg tablet   No No   Sig: Take 1 tablet (20 mg total) by mouth daily Pt takes half a pill 3 x a week   ipratropium (ATROVENT) 0 02 % nebulizer solution   No No   Sig: Take 1 vial (0 5 mg total) by nebulization 3 (three) times a day   ipratropium-albuterol (DUO-NEB) 0 5-2 5 mg/3 mL nebulizer solution   No No   Sig: Take 1 vial (3 mL total) by nebulization every 6 (six) hours   metoprolol tartrate (LOPRESSOR) 25 mg tablet   No No   Sig: Take 1 tablet (25 mg total) by mouth every 12 (twelve) hours nystatin (MYCOSTATIN) powder   No No   Sig: Apply topically 4 (four) times a day   silver sulfadiazine (SILVADENE,SSD) 1 % cream   No No   Sig: Apply topically daily   triamcinolone (KENALOG) 0 1 % cream   No No   Sig: Apply topically 2 (two) times a day      Facility-Administered Medications: None       Past Medical History:   Diagnosis Date    Acute bronchitis     Arthritis     Asthmatic bronchitis     Resolved 10/4/2017     CHF (congestive heart failure) ([facility] )     Community acquired pneumonia     Resolved 10/4/2017     Foot fracture, left     Last assessed 5/27/2014     History of being hospitalized 08/2015    Nondisplaced right femur fracture; [facility]    History of methicillin resistant staphylococcus aureus (MRSA)     7/29/2016 MRSA (nares) positive  Negative 2/21/2018  Isolation discontinued 2/25/2019    Hypertension     Morbid obesity ([facility] )     Muscular dystrophy ([facility] )     Thyroglossal duct cyst     Last assessed 5/27/2014        Past Surgical History:   Procedure Laterality Date    APPENDECTOMY      CHOLECYSTECTOMY      HYSTERECTOMY      IR PICC LINE  2/22/2019    THYROID CYST EXCISION      THYROID SURGERY      TOTAL ABDOMINAL HYSTERECTOMY      Last assessed 5/27/2014    VEIN SURGERY Bilateral        Family History   Problem Relation Age of Onset    No Known Problems Mother      I have reviewed and agree with the history as documented  Social History     Tobacco Use    Smoking status: Never Smoker    Smokeless tobacco: Never Used   Substance Use Topics    Alcohol use: Not Currently     Alcohol/week: 0 0 standard drinks     Frequency: Never     Drinks per session: Patient refused     Binge frequency: Never    Drug use: No        Review of Systems   Constitutional: Negative  Negative for fever  HENT: Negative  Eyes: Negative  Respiratory: Negative  Negative for cough and shortness of breath  Cardiovascular: Negative  Negative for chest pain     Gastrointestinal: Negative  Negative for abdominal pain, diarrhea, nausea and vomiting  Genitourinary: Negative  Negative for dysuria and flank pain  Musculoskeletal: Negative  Negative for back pain and myalgias  Skin: Negative  Negative for rash  Neurological: Negative  Negative for dizziness and headaches  Hematological: Does not bruise/bleed easily  Psychiatric/Behavioral: Positive for confusion  All other systems reviewed and are negative  Physical Exam  Physical Exam   Constitutional: She is oriented to person, place, and time  She appears well-developed and well-nourished  No distress  HENT:   Head: Normocephalic and atraumatic  Eyes: Pupils are equal, round, and reactive to light  Conjunctivae are normal    Neck: Normal range of motion  Neck supple  Cardiovascular: Normal rate, regular rhythm and normal heart sounds  No murmur heard  Pulmonary/Chest: Effort normal  No respiratory distress  She has decreased breath sounds  Abdominal: Soft  Bowel sounds are normal  She exhibits no distension  There is no tenderness  obese   Musculoskeletal: Normal range of motion  She exhibits no edema or deformity  Neurological: She is alert and oriented to person, place, and time  No cranial nerve deficit  Speech intact   Skin: Skin is warm and dry  No rash noted  She is not diaphoretic  No pallor  Psychiatric: She has a normal mood and affect  Her behavior is normal    Nursing note and vitals reviewed        Vital Signs  ED Triage Vitals   Temperature Pulse Respirations Blood Pressure SpO2   01/23/20 1804 01/23/20 1804 01/23/20 1804 01/23/20 1804 01/23/20 1804   98 5 °F (36 9 °C) (!) 110 18 (!) 176/83 99 %      Temp Source Heart Rate Source Patient Position - Orthostatic VS BP Location FiO2 (%)   01/23/20 1804 01/23/20 1804 01/23/20 1804 01/23/20 1804 --   Tympanic Monitor Lying Left arm       Pain Score       01/23/20 1800       No Pain           Vitals:    01/23/20 1804   BP: (!) 176/83   Pulse: (!) 110   Patient Position - Orthostatic VS: Lying         Visual Acuity      ED Medications  Medications   cefTRIAXone (ROCEPHIN) IVPB (premix) 1,000 mg (1,000 mg Intravenous New Bag 1/23/20 1934)       Diagnostic Studies  Results Reviewed     Procedure Component Value Units Date/Time    Influenza A/B and RSV PCR [[id number]] Collected:  01/23/20 1922    Lab Status:   In process Specimen:  Nose Updated:  01/23/20 1930    Blood gas, arterial [[id number]]  (Abnormal) Collected:  01/23/20 1912    Lab Status:  Final result Specimen:  Blood, Arterial from Radial, Left Updated:  01/23/20 1923     pH, Arterial 7 393     PH ART TC 7 394     pCO2, Arterial 62 8 mm Hg      PCO2 (TC) Arterial 62 5 mm Hg      pO2, Arterial 110 1 mm Hg      PO2 (TC) Arterial 109 5 mm Hg      HCO3, Arterial 37 4 mmol/L      Base Excess, Arterial 10 3 mmol/L      O2 Content, Arterial 16 7 mL/dL      O2 HGB,Arterial  97 0 %      SOURCE Radial, Left     MANDO TEST Yes     Temperature 98 5 Degrees Fehrenheit      Nasal Cannula 2 lpm    Comprehensive metabolic panel [[id number]]  (Abnormal) Collected:  01/23/20 1849    Lab Status:  Final result Specimen:  Blood from Arm, Right Updated:  01/23/20 1918     Sodium 141 mmol/L      Potassium 4 4 mmol/L      Chloride 99 mmol/L      CO2 38 mmol/L      ANION GAP 4 mmol/L      BUN 15 mg/dL      Creatinine 0 30 mg/dL      Glucose 105 mg/dL      Calcium 9 4 mg/dL      AST 18 U/L      ALT 19 U/L      Alkaline Phosphatase 118 U/L      Total Protein 7 1 g/dL      Albumin 3 1 g/dL      Total Bilirubin 0 10 mg/dL      eGFR 103 ml/min/1 73sq m     Narrative:       Walter guidelines for Chronic Kidney Disease (CKD):     Stage 1 with normal or high GFR (GFR > 90 mL/min/1 73 square meters)    Stage 2 Mild CKD (GFR = 60-89 mL/min/1 73 square meters)    Stage 3A Moderate CKD (GFR = 45-59 mL/min/1 73 square meters)    Stage 3B Moderate CKD (GFR = 30-44 mL/min/1 73 square meters)    Stage 4 Severe CKD (GFR = 15-29 mL/min/1 73 square meters)    Stage 5 End Stage CKD (GFR <15 mL/min/1 73 square meters)  Note: GFR calculation is accurate only with a steady state creatinine    Troponin I [[id number]]  (Normal) Collected:  01/23/20 1849    Lab Status:  Final result Specimen:  Blood from Arm, Right Updated:  01/23/20 1918     Troponin I <0 02 ng/mL     Protime-INR [[id number]]  (Normal) Collected:  01/23/20 1849    Lab Status:  Final result Specimen:  Blood from Arm, Right Updated:  01/23/20 1913     Protime 10 1 seconds      INR 0 94    APTT [[id number]]  (Normal) Collected:  01/23/20 1849    Lab Status:  Final result Specimen:  Blood from Arm, Right Updated:  01/23/20 1913     PTT 30 seconds     Urine Microscopic [[id number]]  (Abnormal) Collected:  01/23/20 1858    Lab Status:  Final result Specimen:  Urine Updated:  01/23/20 1910     RBC, UA None Seen /hpf      WBC, UA 4-10 /hpf      Epithelial Cells Moderate /hpf      Bacteria, UA Moderate /hpf      AMORPH URATES Moderate /hpf      MUCUS THREADS Occasional    UA (URINE) with reflex to Scope [[id number]]  (Abnormal) Collected:  01/23/20 1858    Lab Status:  Final result Specimen:  Urine Updated:  01/23/20 1900     Color, UA Light Yellow     Clarity, UA Slightly Cloudy     Specific Gravity, UA <=1 005     pH, UA 7 5     Leukocytes, UA Trace     Nitrite, UA Positive     Protein, UA Negative mg/dl      Glucose, UA Negative mg/dl      Ketones, UA Negative mg/dl      Urobilinogen, UA 0 2 E U /dl      Bilirubin, UA Negative     Blood, UA Negative    CBC and differential [[id number]]  (Abnormal) Collected:  01/23/20 1849    Lab Status:  Final result Specimen:  Blood from Arm, Right Updated:  01/23/20 1859     WBC 11 24 Thousand/uL      RBC 4 45 Million/uL      Hemoglobin 11 7 g/dL      Hematocrit 39 1 %      MCV 88 fL      MCH 26 3 pg      MCHC 29 9 g/dL      RDW 14 9 %      MPV 9 6 fL      Platelets 398 Thousands/uL      nRBC 0 /100 WBCs      Neutrophils Relative 79 %      Immat GRANS % 0 %      Lymphocytes Relative 15 %      Monocytes Relative 6 %      Eosinophils Relative 0 %      Basophils Relative 0 %      Neutrophils Absolute 8 80 Thousands/µL      Immature Grans Absolute 0 03 Thousand/uL      Lymphocytes Absolute 1 70 Thousands/µL      Monocytes Absolute 0 66 Thousand/µL      Eosinophils Absolute 0 00 Thousand/µL      Basophils Absolute 0 05 Thousands/µL                  XR chest 1 view portable   ED Interpretation by Sue Rivas MD (73/83 7110)   NAD, CM, pt  rotated       by Hubert Cabrera (01/23 6048)                 Procedures  ECG 12 Lead Documentation Only  Date/Time: 1/23/2020 6:24 PM  Performed by: Sue Rivas MD  Authorized by: Sue Rivas MD     Indications / Diagnosis:  Altered mental status  ECG reviewed by me, the ED Provider: yes    Patient location:  ED  Previous ECG:     Previous ECG:  Unavailable  Interpretation:     Interpretation: normal    Rate:     ECG rate:  74    ECG rate assessment: normal    Rhythm:     Rhythm: sinus rhythm    Ectopy:     Ectopy: none    QRS:     QRS axis:  Normal  Conduction:     Conduction: normal    ST segments:     ST segments:  Normal  T waves:     T waves: normal               ED Course                               MDM  Number of Diagnoses or Management Options  Altered mental status:   Hypercarbia:   UTI (urinary tract infection):   Diagnosis management comments: Discussed with Dr Scooby Bernstein  Pt  Admitted a year ago for hypercapnia, PCO2 was 80 and then down to 59  Pt  Also has UTI  In light of pt  Being symptomatic, will admit, do BIPAP,  Recheck PCO2, and abx for UTI  Discussed with Dr Malhotra Shown         Amount and/or Complexity of Data Reviewed  Clinical lab tests: reviewed  Tests in the radiology section of CPT®: reviewed  Tests in the medicine section of CPT®: reviewed  Discussion of test results with the performing providers: yes  Decide to obtain previous medical records or to obtain history from someone other than the patient: yes  Obtain history from someone other than the patient: yes  Review and summarize past medical records: yes  Discuss the patient with other providers: yes  Independent visualization of images, tracings, or specimens: yes          Disposition  Final diagnoses: Altered mental status   Hypercarbia   UTI (urinary tract infection)     Time reflects when diagnosis was documented in both MDM as applicable and the Disposition within this note     Time User Action Codes Description Comment    0/29/4616  5:93 PM Terrence Esquivel Add [B58 71] Altered mental status     9/63/1983  3:27 PM Terrence Esquivel Add [Y45 08] Hypercarbia     7/05/5456  0:47 PM Aruna ALMAGUER Add [U92 7] UTI (urinary tract infection)       ED Disposition     ED Disposition Condition Date/Time Comment    Admit Stable Thu Jan 23, 2020  7:48 PM Case was discussed with **Dr Julia Goodman* and the patient's admission status was agreed to be Admission Status: observation status        Follow-up Information    None         Patient's Medications   Discharge Prescriptions    No medications on file     No discharge procedures on file      ED Provider  Electronically Signed by           Stanley Wilburn MD  02/98/01 5102

## 2020-01-23 NOTE — ASSESSMENT & PLAN NOTE
Acute on chronic respiratory failure with hypercarbia  ABG admission:  pH 7 393, pCO2 62 8  · Continue with Breo Ellipta 100-25 q d    · Maintain oxygen saturation above 92%  · Consult pulmonology- Patient of Dr Mata Grain  · AM repeat ABG

## 2020-01-23 NOTE — ASSESSMENT & PLAN NOTE
Patient came to ED as she was having hallucinations, typically occurring when she is very hypercapnic  ABG admission:  7 394/62 5/109 5/37 4 with repeat this morning 7 406/63 2/120/38 9  ABG in the past have show this patient is typically hypercapnic most likely 2/2 to her baseline MDS    CXR: tiny right pleural effusion  · BNP   · Flu/RSV neg    · Lasix 20 mg po daily starting tomorrow ; sp lasix IV 40 mg on admission  · BiPAP at night  · Vitals q4h and will monitor clinically  · Continue with Breo Ellipta 100-25 q d  · DuoNebs q 6 scheduled  · Maintain oxygen saturation above 92% - currently on homoe 2 L n and BIPAP at night  · Consult pulmonology- Patient of Dr aKtherin Parry  · Current respiratory status most likely 2/2 decompensation of unknown etiology from her baseline chronic resp failure due to MDS    · PNA unlikely at this time

## 2020-01-23 NOTE — ASSESSMENT & PLAN NOTE
BP WNL  · Continue with home medication Lopressor 25 mg p o  B i d , continue home medication diltiazem 240 mg p o  Q d    · Will monitor VS qshift

## 2020-01-23 NOTE — ASSESSMENT & PLAN NOTE
Wt Readings from Last 3 Encounters:   02/28/19 103 kg (227 lb 1 2 oz)   04/26/18 125 kg (275 lb)   07/29/16 89 kg (196 lb 3 4 oz)   Echo 02/2019:  LV EF 55-60%; mildly dilated left and right atria; BNP on admission; CXR  · Continue home medication Lopressor 25 mg p o  B i d , diltiazem 240 mg p o  Q d  · Lasix 20 mg p o     · Telemetry  · Fluid restriction  · I&O  · Daily weights

## 2020-01-24 PROBLEM — J96.22 ACUTE ON CHRONIC RESPIRATORY FAILURE WITH HYPERCAPNIA (HCC): Status: ACTIVE | Noted: 2020-01-23

## 2020-01-24 PROBLEM — J96.21 ACUTE ON CHRONIC RESPIRATORY FAILURE WITH HYPOXIA AND HYPERCAPNIA (HCC): Status: ACTIVE | Noted: 2020-01-23

## 2020-01-24 PROBLEM — J96.10 CHRONIC RESPIRATORY FAILURE (HCC): Status: RESOLVED | Noted: 2020-01-23 | Resolved: 2020-01-24

## 2020-01-24 LAB
25(OH)D3 SERPL-MCNC: 8 NG/ML (ref 30–100)
ALBUMIN SERPL BCP-MCNC: 2.8 G/DL (ref 3.5–5)
ALP SERPL-CCNC: 106 U/L (ref 46–116)
ALT SERPL W P-5'-P-CCNC: 16 U/L (ref 12–78)
ANION GAP SERPL CALCULATED.3IONS-SCNC: 2 MMOL/L (ref 4–13)
ARTERIAL PATENCY WRIST A: YES
AST SERPL W P-5'-P-CCNC: 14 U/L (ref 5–45)
ATRIAL RATE: 74 BPM
BASE EXCESS BLDA CALC-SCNC: 11.9 MMOL/L
BILIRUB SERPL-MCNC: 0.2 MG/DL (ref 0.2–1)
BODY TEMPERATURE: 98.4 DEGREES FEHRENHEIT
BUN SERPL-MCNC: 16 MG/DL (ref 5–25)
CALCIUM SERPL-MCNC: 8.3 MG/DL (ref 8.3–10.1)
CHLORIDE SERPL-SCNC: 99 MMOL/L (ref 100–108)
CO2 SERPL-SCNC: 38 MMOL/L (ref 21–32)
CREAT SERPL-MCNC: 0.39 MG/DL (ref 0.6–1.3)
ERYTHROCYTE [DISTWIDTH] IN BLOOD BY AUTOMATED COUNT: 14.8 % (ref 11.6–15.1)
GFR SERPL CREATININE-BSD FRML MDRD: 94 ML/MIN/1.73SQ M
GLUCOSE SERPL-MCNC: 105 MG/DL (ref 65–140)
HCO3 BLDA-SCNC: 38.9 MMOL/L (ref 22–28)
HCT VFR BLD AUTO: 34.3 % (ref 34.8–46.1)
HGB BLD-MCNC: 10.5 G/DL (ref 11.5–15.4)
MAGNESIUM SERPL-MCNC: 1.9 MG/DL (ref 1.6–2.6)
MCH RBC QN AUTO: 26.6 PG (ref 26.8–34.3)
MCHC RBC AUTO-ENTMCNC: 30.6 G/DL (ref 31.4–37.4)
MCV RBC AUTO: 87 FL (ref 82–98)
NASAL CANNULA: 2
NON VENT ROOM AIR: 28 %
O2 CT BLDA-SCNC: 15.8 ML/DL (ref 16–23)
OXYHGB MFR BLDA: 97.2 % (ref 94–97)
P AXIS: 48 DEGREES
PCO2 BLDA: 63.5 MM HG (ref 36–44)
PCO2 TEMP ADJ BLDA: 63.2 MM HG (ref 36–44)
PH BLD: 7.41 [PH] (ref 7.35–7.45)
PH BLDA: 7.41 [PH] (ref 7.35–7.45)
PHOSPHATE SERPL-MCNC: 3.3 MG/DL (ref 2.3–4.1)
PLATELET # BLD AUTO: 263 THOUSANDS/UL (ref 149–390)
PMV BLD AUTO: 9.5 FL (ref 8.9–12.7)
PO2 BLD: 120 MM HG (ref 75–129)
PO2 BLDA: 120.6 MM HG (ref 75–129)
POTASSIUM SERPL-SCNC: 3.6 MMOL/L (ref 3.5–5.3)
PR INTERVAL: 160 MS
PROT SERPL-MCNC: 6.5 G/DL (ref 6.4–8.2)
QRS AXIS: 80 DEGREES
QRSD INTERVAL: 92 MS
QT INTERVAL: 404 MS
QTC INTERVAL: 448 MS
RBC # BLD AUTO: 3.95 MILLION/UL (ref 3.81–5.12)
SODIUM SERPL-SCNC: 139 MMOL/L (ref 136–145)
SPECIMEN SOURCE: ABNORMAL
T WAVE AXIS: 66 DEGREES
VENTRICULAR RATE: 74 BPM
WBC # BLD AUTO: 10.35 THOUSAND/UL (ref 4.31–10.16)

## 2020-01-24 PROCEDURE — 83735 ASSAY OF MAGNESIUM: CPT | Performed by: STUDENT IN AN ORGANIZED HEALTH CARE EDUCATION/TRAINING PROGRAM

## 2020-01-24 PROCEDURE — 94640 AIRWAY INHALATION TREATMENT: CPT

## 2020-01-24 PROCEDURE — 99223 1ST HOSP IP/OBS HIGH 75: CPT | Performed by: PHYSICIAN ASSISTANT

## 2020-01-24 PROCEDURE — 97161 PT EVAL LOW COMPLEX 20 MIN: CPT

## 2020-01-24 PROCEDURE — 94660 CPAP INITIATION&MGMT: CPT

## 2020-01-24 PROCEDURE — 84100 ASSAY OF PHOSPHORUS: CPT | Performed by: STUDENT IN AN ORGANIZED HEALTH CARE EDUCATION/TRAINING PROGRAM

## 2020-01-24 PROCEDURE — 82805 BLOOD GASES W/O2 SATURATION: CPT | Performed by: STUDENT IN AN ORGANIZED HEALTH CARE EDUCATION/TRAINING PROGRAM

## 2020-01-24 PROCEDURE — 99219 PR INITIAL OBSERVATION CARE/DAY 50 MINUTES: CPT | Performed by: FAMILY MEDICINE

## 2020-01-24 PROCEDURE — 97167 OT EVAL HIGH COMPLEX 60 MIN: CPT

## 2020-01-24 PROCEDURE — 93010 ELECTROCARDIOGRAM REPORT: CPT | Performed by: INTERNAL MEDICINE

## 2020-01-24 PROCEDURE — 80053 COMPREHEN METABOLIC PANEL: CPT | Performed by: STUDENT IN AN ORGANIZED HEALTH CARE EDUCATION/TRAINING PROGRAM

## 2020-01-24 PROCEDURE — 82306 VITAMIN D 25 HYDROXY: CPT | Performed by: STUDENT IN AN ORGANIZED HEALTH CARE EDUCATION/TRAINING PROGRAM

## 2020-01-24 PROCEDURE — 85027 COMPLETE CBC AUTOMATED: CPT | Performed by: STUDENT IN AN ORGANIZED HEALTH CARE EDUCATION/TRAINING PROGRAM

## 2020-01-24 PROCEDURE — NC001 PR NO CHARGE: Performed by: FAMILY MEDICINE

## 2020-01-24 PROCEDURE — 94760 N-INVAS EAR/PLS OXIMETRY 1: CPT

## 2020-01-24 PROCEDURE — 87081 CULTURE SCREEN ONLY: CPT | Performed by: FAMILY MEDICINE

## 2020-01-24 RX ORDER — MAGNESIUM SULFATE 1 G/100ML
1 INJECTION INTRAVENOUS ONCE
Status: COMPLETED | OUTPATIENT
Start: 2020-01-24 | End: 2020-01-24

## 2020-01-24 RX ORDER — FUROSEMIDE 20 MG/1
20 TABLET ORAL DAILY
Status: DISCONTINUED | OUTPATIENT
Start: 2020-01-24 | End: 2020-01-24

## 2020-01-24 RX ORDER — MELATONIN
1000 DAILY
Status: DISCONTINUED | OUTPATIENT
Start: 2020-01-24 | End: 2020-01-25 | Stop reason: HOSPADM

## 2020-01-24 RX ORDER — ACETAMINOPHEN 325 MG/1
650 TABLET ORAL EVERY 6 HOURS PRN
Status: DISCONTINUED | OUTPATIENT
Start: 2020-01-24 | End: 2020-01-25 | Stop reason: HOSPADM

## 2020-01-24 RX ORDER — FUROSEMIDE 20 MG/1
20 TABLET ORAL DAILY
Status: DISCONTINUED | OUTPATIENT
Start: 2020-01-25 | End: 2020-01-25 | Stop reason: HOSPADM

## 2020-01-24 RX ORDER — NYSTATIN 100000 [USP'U]/G
1 POWDER TOPICAL 2 TIMES DAILY
Status: DISCONTINUED | OUTPATIENT
Start: 2020-01-24 | End: 2020-01-25 | Stop reason: HOSPADM

## 2020-01-24 RX ORDER — POTASSIUM CHLORIDE 20 MEQ/1
40 TABLET, EXTENDED RELEASE ORAL ONCE
Status: COMPLETED | OUTPATIENT
Start: 2020-01-24 | End: 2020-01-24

## 2020-01-24 RX ADMIN — IPRATROPIUM BROMIDE AND ALBUTEROL SULFATE 3 ML: 2.5; .5 SOLUTION RESPIRATORY (INHALATION) at 13:14

## 2020-01-24 RX ADMIN — IPRATROPIUM BROMIDE AND ALBUTEROL SULFATE 3 ML: 2.5; .5 SOLUTION RESPIRATORY (INHALATION) at 21:44

## 2020-01-24 RX ADMIN — MAGNESIUM SULFATE HEPTAHYDRATE 1 G: 1 INJECTION, SOLUTION INTRAVENOUS at 08:27

## 2020-01-24 RX ADMIN — ACETAMINOPHEN 650 MG: 325 TABLET, FILM COATED ORAL at 03:21

## 2020-01-24 RX ADMIN — FLUTICASONE FUROATE AND VILANTEROL TRIFENATATE 1 PUFF: 100; 25 POWDER RESPIRATORY (INHALATION) at 08:43

## 2020-01-24 RX ADMIN — NYSTATIN 1 APPLICATION: 100000 POWDER TOPICAL at 14:24

## 2020-01-24 RX ADMIN — POTASSIUM CHLORIDE 40 MEQ: 1500 TABLET, EXTENDED RELEASE ORAL at 08:43

## 2020-01-24 RX ADMIN — ENOXAPARIN SODIUM 40 MG: 40 INJECTION SUBCUTANEOUS at 08:42

## 2020-01-24 RX ADMIN — DILTIAZEM HYDROCHLORIDE 240 MG: 240 CAPSULE, COATED, EXTENDED RELEASE ORAL at 08:43

## 2020-01-24 RX ADMIN — IPRATROPIUM BROMIDE AND ALBUTEROL SULFATE 3 ML: 2.5; .5 SOLUTION RESPIRATORY (INHALATION) at 05:06

## 2020-01-24 RX ADMIN — METOPROLOL TARTRATE 25 MG: 25 TABLET ORAL at 08:43

## 2020-01-24 RX ADMIN — CELECOXIB 200 MG: 100 CAPSULE ORAL at 08:42

## 2020-01-24 RX ADMIN — IPRATROPIUM BROMIDE AND ALBUTEROL SULFATE 3 ML: 2.5; .5 SOLUTION RESPIRATORY (INHALATION) at 09:06

## 2020-01-24 RX ADMIN — VITAMIN D, TAB 1000IU (100/BT) 1000 UNITS: 25 TAB at 14:01

## 2020-01-24 RX ADMIN — CEFTRIAXONE 1000 MG: 1 INJECTION, SOLUTION INTRAVENOUS at 20:24

## 2020-01-24 RX ADMIN — NYSTATIN 1 APPLICATION: 100000 POWDER TOPICAL at 20:25

## 2020-01-24 NOTE — PHYSICAL THERAPY NOTE
PT EVALUATION       01/24/20 1010   Pain Assessment   Pain Assessment No/denies pain   Home Living   Type of [address] [facility] entrance; Two level; Able to live on main level with bedroom/bathroom   [address] [facility] bed  (jaye lift, tilt back manual WC)   Prior Function   Level of [place]   (bedbound >20 years)   Lives With [employer] Help From Family   ADL Assistance Needs assistance   Comments Pt's family cares for her  She uses a mechanical lift to get OOB  Pt is dependent for all mobility except pt is able to feed herself with set up  Restrictions/Precautions   Other Precautions Fall Risk   General   Additional Pertinent History Pt admitted with hallucinations and change in MS  Pt now appears to be back at baseline  Cognition   Overall Cognitive Status WFL   Arousal/Participation Cooperative   Following Commands Follows one step commands without difficulty   RLE Assessment   RLE Assessment   (ROM WFL, LEs flaccid)   LLE Assessment   LLE Assessment   (ROM WFL, LEs flaccid)   Bed Mobility   Rolling R 1  Dependent   Rolling L 1  Dependent   Assessment   Assessment Patient seen for Physical Therapy evaluation  Patient admitted with Acute on chronic respiratory failure with hypercapnia ([facility] )  Comorbidities affecting patient's physical performance include: limb girdle MD  Prior to admission, patient was dependent for mobility using a mechanical lift to get OOB  Please find objective findings from Physical Therapy assessment regarding body systems outlined above with impairments and limitations including weakness  The Barthel Index was used as a functional outcome tool presenting with a score of 30 today indicating marked limitations of functional mobility and ADLS  Patient's clinical presentation is currently stable  as seen in patient's presentation of baseline  As demonstrated by objective findings, the assigned level of complexity for this evaluation is low    PATIENT HAS BEEN BED BOUND FOR YEARS AND HER FAMILY CARES FOR HER  NO SKILLED PHYSICAL THERAPY NEEDS     Recommendation   Recommendation Home with family support   Barthel Index   Feeding 5   Bathing 0   Grooming Score 0   Dressing Score 0   Bladder Score 10   Bowels Score 10   Toilet Use Score 0   Transfers (Bed/Chair) Score 5   Mobility (Level Surface) Score 0   Stairs Score 0   Barthel Index Score 27   Licensure   [place] License Number  Maryam Navarro PT [id number]

## 2020-01-24 NOTE — PROGRESS NOTES
Progress Note - Jai Belle 80 y o  female MRN: [id number]    Unit/Bed#: [address] [place] [address] Encounter: [id number]      Assessment/Plan:  * Acute on chronic respiratory failure with hypoxia and hypercapnia [facility])  Assessment & Plan  Patient came to ED as she was having hallucinations, typically occurring when she is very hypercapnic  ABG admission:  7 394/62 5/109 5/37 4 with repeat this morning 7 406/63 2/120/38 9  ABG in the past have show this patient is typically hypercapnic most likely 2/2 to her baseline MDS    CXR: tiny right pleural effusion  · BNP   · Flu/RSV neg    · Lasix 20 mg po daily starting tomorrow ; sp lasix IV 40 mg on admission  · BiPAP at night  · Vitals q4h and will monitor clinically  · Continue with Breo Ellipta 100-25 q d  · DuoNebs q 6 scheduled  · Maintain oxygen saturation above 92% - currently on homoe 2 L n and BIPAP at night  · Consult pulmonology- Patient of Dr Alis Barrientos  · Current respiratory status most likely 2/2 decompensation of unknown etiology from her baseline chronic resp failure due to MDS  · PNA unlikely at this time      UTI (urinary tract infection)  Assessment & Plan  Patient presents with altered mental status x1 day which could be secondary to UTI  Urine micro:  4-10 WBC, moderate bacteria  WBC 11 24  · Urine culture pending  · Continue Rocephin 1 g IV v- consider transition to PO Bactrim on discharge    Diastolic congestive heart failure (HCC)  Assessment & Plan  Wt Readings from Last 3 Encounters:   02/28/19 103 kg (227 lb 1 2 oz)   04/26/18 125 kg (275 lb)   07/29/16 89 kg (196 lb 3 4 oz)   Echo 02/2019:  LV EF 55-60%; mildly dilated left and right atria; BNP on admission; CXR  · Continue home medication Lopressor 25 mg p o  B i d , diltiazem 240 mg p o  Q d  · Lasix 20 mg p o     · Telemetry  · Fluid restriction  · I&O  · Daily weights    PORTIA treated with BiPAP  Assessment & Plan  · BiPAP q h s  12/6    Sacral ulcer ([address] )  Assessment & Plan  · 2 grade 2 ulcers over lower buttocks  · Consult placed to wound care    Muscular dystrophy, limb girdle  Assessment & Plan  Patient is bedbound  Patient deconditioned, discussed with patient possible utility of physical therapy for upper limbs  · Will order PT/OT    Essential hypertension  Assessment & Plan  BP WNL  · Continue with home medication Lopressor 25 mg p o  B i d , continue home medication diltiazem 240 mg p o  Q d  · Will monitor VS qshift    Generalized osteoarthritis  Assessment & Plan  · Will continue with celebrex 200 mg  · Vitamin D level low at 8  · Started patient on 5000 U Vit D3 - will continue this regimen on discharge for a total of 8 weeks, then transition to maintenance 1000 U daily        Subjective:   Patient denies any visual or auditory hallucinations today  She is able to breath well and has no complaint aside from mild nasal congestion  Denies fever, chills, sob, fever, headache  No dysuria, hematuria  Cannot comment on frequency of voiding as she is incontinent  Objective:     Vitals: Blood pressure 114/66, pulse 85, temperature 97 5 °F (36 4 °C), temperature source Oral, resp  rate 17, height 5' 6" (1 676 m), weight 104 kg (228 lb 9 9 oz), SpO2 98 %, not currently breastfeeding  ,Body mass index is 36 9 kg/m²        Intake/Output Summary (Last 24 hours) at 1/24/2020 1545  Last data filed at 1/24/2020 0200  Gross per 24 hour   Intake    Output 825 ml   Net -825 ml       Physical Exam: /66 (BP Location: Left arm)   Pulse 85   Temp 97 5 °F (36 4 °C) (Oral)   Resp 17   Ht 5' 6" (1 676 m)   Wt 104 kg (228 lb 9 9 oz)   SpO2 98%   BMI 36 90 kg/m²   General appearance: alert and oriented, in no acute distress and bed bound  Lungs: clear to auscultation bilaterally  Heart: regular rate and rhythm, S1, S2 normal, no murmur, click, rub or gallop  Extremities: extremities normal, warm and well-perfused; no cyanosis, clubbing, or edema     Invasive Devices     Peripheral Intravenous Line Peripheral IV 01/23/20 Right Antecubital 1 day          Drain            External Urinary Catheter less than 1 day                Lab, Imaging and other studies: I have personally reviewed pertinent reports      VTE Pharmacologic Prophylaxis: Enoxaparin (Lovenox)  VTE Mechanical Prophylaxis: sequential compression device

## 2020-01-24 NOTE — CONSULTS
Consultation - Pulmonary Medicine  Brand Manger 80 y o  female MRN: [id number]  Unit/Bed#: [address] [place] [address] Encounter: [id number]  Code Status: Level 3 - DNAR and DNI    Assessment/Plan:    Alteration of mental status:  -likely UTI related  -nonfocal  -she has returned back to baseline as of this point    UTI:  -patient contents on Rocephin daily  -managed by primary team    Acute on chronic hypoxemic and hypercapnic respiratory failure:  -patient reports discomfort with BiPAP on 12/6  -her respiratory status is stable and is okay to down titrate to 10/5 and continue on 2 L around the clock  -she remains 99% on 2 L cannula which is her baseline  -alteration of mental status not attributable to respiratory failure  -morbid obesity and muscular dystrophy or contributing to her chronic respiratory failure  -no prior PFT as she would not physically be able to comply with this    D/C and Follow up Needs:  -maintain on BiPAP  -duo neb therapy as needed  -she need not continue on Breo in the home setting as she was not on this previously  -continue 2 L home oxygen  -patient will be followed up in the outpatient setting with Dr Edris Primrose  -she does not typically come to the office in the outpatient setting    Thank you for this consultation; we will be happy to follow with you     ______________________________________________________________________      History of Present Illness   Physician Requesting Consult: Klaudia Dangelo MD  Reason for Consult / Principal Problem:  Respiratory failure  HX and PE limited by:     HPI:  Killian Hahn is a 80 y o  female  who presents with acute dyspnea  20-year-old female with 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, chronic hypercarbic respiratory failure, chronic bronchitis nighttime hypoxemia 2 L dependent nasal cannula, morbid obesity with chronic limited function and is bed-bound/wheelchair-bound, PORTIA on BiPAP at home 10/5 initially presented with altered mental status      Question whether this was related to UTI or hypercarbic respiratory failure  Her initial ABG was consistent with stable chronic hypercarbic respiratory failure compensated respiratory acidosis pH 7 39/62  She is on BiPAP 12/6  She states that she feels that this pressure increase significantly increases her discomfort and would like to be placed back on 10/5  Here in the hospital she is on Breo 100/25 1 puff daily, DuoNeb every 6 hours  At home she is typically on duo nebs only  She does not have any historical wheezing nor she wheezing now  She is known to [facility], however, in the past has been bed-bound and does not follow up in the outpatient setting in the office  She occasionally gets home visits from Dr Nemesio Urena [facility]    She is home and bedbound  Her children tend to her and are her caregivers at home  Smoking history: Non Smoker / Never Smoker > 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, 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  Environmental History:  Lives at home with her daughter Tyronne Pallas ,  approx 27 yr ago  Travel history:  No recent travel, homebound, bedbound  Respiratory History:  Mixed respiratory failure  Oxygen Therapy:  2 L around the clock and bedtime  PAP Therapy:  BiPAP 10/5/2 L  D[facility]      Review of Systems   Reason unable to perform ROS: No current respiratory issues  Constitutional: Negative for activity change, appetite change, chills, fatigue and fever  HENT: Negative for postnasal drip, rhinorrhea, sinus pressure and sinus pain  Eyes: Negative for visual disturbance  Respiratory: Negative for apnea, cough, chest tightness, shortness of breath, wheezing and stridor  Cardiovascular: Negative for chest pain and leg swelling  Gastrointestinal: Negative for diarrhea, nausea and vomiting     Endocrine: Negative for cold intolerance and heat intolerance  Musculoskeletal: Negative for arthralgias, back pain, joint swelling and myalgias  Skin: Negative for color change  Neurological: Negative for syncope and light-headedness  Hematological: Negative for adenopathy  Psychiatric/Behavioral: Negative for confusion and sleep disturbance  Past Medical/Surgical History  Past Medical History:   Diagnosis Date    Acute bronchitis     Arthritis     Asthmatic bronchitis     Resolved 10/4/2017     CH[facility]congestive [facility] failure) [facility] [address] [facility]     Community acquired pneumonia     Resolved 10/4/2017     Foot fracture, left     Last assessed 5/27/2014     History of being hospitalized 08/2015    Nondisplaced right femur fracture; [facility]    History of methicillin resistant staphylococcus aureus (MRSA)     7/29/2016 MRSA (nares) positive  Negative 2/21/2018   Isolation discontinued 2/25/2019    Hypertension     Morbid obesity ([facility] )     Muscular dystrophy ([facility] )     Thyroglossal duct cyst     Last assessed 5/27/2014      Past Surgical History:   Procedure Laterality Date    APPENDECTOMY      CHOLECYSTECTOMY      HYSTERECTOMY      IR PICC LINE  2/22/2019    THYROID CYST EXCISION      THYROID SURGERY      TOTAL ABDOMINAL HYSTERECTOMY      Last assessed 5/27/2014    VEIN SURGERY Bilateral        Social History  Social History     Substance and Sexual Activity   Alcohol Use Never    Alcohol/week: 0 0 standard drinks    Frequency: Never    Drinks per session: Patient refused    Binge frequency: Never     Social History     Substance and Sexual Activity   Drug Use No     Social History     Tobacco Use   Smoking Status Never Smoker   Smokeless Tobacco Never Used       Family History  Family History   Problem Relation Age of Onset    No Known Problems Mother        Allergies  No Known Allergies    Home Meds:   Medications Prior to Admission   Medication Sig Dispense Refill Last Dose    acetaminophen (TYLENOL) 325 mg tablet Take one or 2 every 6 hours as needed for pain 30 tablet 0 Past Month at Unknown time    albuterol (ACCUNEB) 0 63 MG/3ML nebulizer solution Take 3 mL (0 63 mg total) by nebulization every 6 (six) hours as needed for wheezing or shortness of breath 30 mL 3 1/23/2020 at Unknown time    celecoxib (CeleBREX) 200 mg capsule Take 1 capsule (200 mg total) by mouth daily 90 capsule 3 1/22/2020 at Unknown time    diltiazem (CARDIZEM CD) 240 mg 24 hr capsule Take 1 capsule (240 mg total) by mouth daily 90 capsule 3 1/23/2020 at Unknown time    furosemide (LASIX) 20 mg tablet Take 1 tablet (20 mg total) by mouth daily Pt takes half a pill 3 x a week (Patient taking differently: Take 20 mg by mouth daily ) 90 tablet 3 1/22/2020 at Unknown time    ipratropium (ATROVENT) 0 02 % nebulizer solution Take 1 vial (0 5 mg total) by nebulization 3 (three) times a day 100 vial 0 1/23/2020 at Unknown time    ipratropium-albuterol (DUO-NEB) 0 5-2 5 mg/3 mL nebulizer solution Take 1 vial (3 mL total) by nebulization every 6 (six) hours 120 vial 6 1/23/2020 at Unknown time    metoprolol tartrate (LOPRESSOR) 25 mg tablet Take 1 tablet (25 mg total) by mouth every 12 (twelve) hours 180 tablet 3 1/23/2020 at Unknown time    nystatin (MYCOSTATIN) powder Apply topically 4 (four) times a day 56 7 g 3 1/23/2020 at Unknown time    silver sulfadiazine (SILVADENE,SSD) 1 % cream Apply topically daily 50 g 3 1/23/2020 at Unknown time    triamcinolone (KENALOG) 0 1 % cream Apply topically 2 (two) times a day 80 g 5 Past Week at Unknown time    fluocinonide (LIDEX) 0 05 % cream Apply topically 2 (two) times a day (Patient not taking: Reported on 1/23/2020) 30 g 5 Not Taking at Unknown time    fluticasone-vilanterol (BREO ELLIPTA) 100-25 mcg/inh inhaler Inhale 1 puff daily Rinse mouth after use   (Patient not taking: Reported on 1/23/2020) 1 Inhaler 0 Not Taking at Unknown time     Current Meds:   Scheduled Meds:  Current Facility-Administered Medications:  acetaminophen 650 mg Oral Q6H PRN Jose Trevino DO   cefTRIAXone 1,000 mg Intravenous Q24H Meera Diaz MD   celecoxib 200 mg Oral Daily Meera Diaz MD   diltiazem 240 mg Oral Daily Meera Diaz MD   enoxaparin 40 mg Subcutaneous Daily Meera Diaz MD   fluticasone-vilanterol 1 puff Inhalation Daily Meera Diaz MD   [START ON 1/25/2020] furosemide 20 mg Oral Daily Ash Hi MD   ipratropium-albuterol 3 mL Nebulization Q6H Meera Diaz MD   metoprolol tartrate 25 mg Oral Q12H Oneil Castro MD     PRN Meds:  acetaminophen 650 mg Q6H PRN       ____________________________________________________________________    Objective   Vitals:   Temp:  [97 5 °F (36 4 °C)-98 5 °F (36 9 °C)] 97 5 °F (36 4 °C)  HR:  [] 85  Resp:  [17-24] 17  BP: (114-176)/(54-83) 114/66  FiO2 (%):  [30] 30  Weight (last 2 days)     Date/Time   Weight    01/24/20 0600   104 (228 62)    01/23/20 2042   103 (227 8)            Oxygen Therapy  SpO2: 98 %  O2 Flow Rate (L/min): 2 L/min    IV Infusions:        Nutrition:        Diet Orders   (From admission, onward)             Start     Ordered    01/24/20 0810  Room Service  Once     Question:  Type of Service  Answer:  Room Service - Appropriate with Assistance    01/24/20 0809    01/23/20 2042  Diet Cardiovascular; Cardiac; Fluid Restriction 1500 ML  Diet effective now     Question Answer Comment   Diet Type Cardiovascular    Cardiac Cardiac    Other Restriction(s): Fluid Restriction 1500 ML    RD to adjust diet per protocol?  Yes        01/23/20 2041                  Ins/Outs:   I/O       01/22 0701 - 01/23 0700 01/23 0701 - 01/24 0700 01/24 0701 - 01/25 0700    Urine (mL/kg/hr)  825     Total Output  825     Net  -825                    Lines/Drains:  Invasive Devices     Peripheral Intravenous Line            Peripheral IV 01/23/20 Right Antecubital 1 day          Drain            External Urinary Catheter less than 1 day ____________________________________________________________________      Physical Exam   Constitutional: She is oriented to person, place, and time  She appears well-developed  Elderly, but younger than stated age for appearance    Overall generally obese body habitus    Chronically bedbound   HENT:   Head: Normocephalic and atraumatic  Eyes: Pupils are equal, round, and reactive to light  Conjunctivae are normal    Neck: Normal range of motion  Neck supple  Cardiovascular: Normal rate, regular rhythm and normal heart sounds  Pulmonary/Chest: Effort normal  No respiratory distress  She has decreased breath sounds in the right upper field, the right middle field, the right lower field, the left upper field, the left middle field and the left lower field  She has no wheezes  She has no rales  She exhibits no tenderness  Moderately decreased breath sounds    Currently on 2 L nasal cannula    Oxygen saturation 99% with accurate plethysmography   Abdominal: Soft  Bowel sounds are normal    Musculoskeletal: Normal range of motion  She exhibits no edema  Right lower leg: She exhibits no edema  Left lower leg: She exhibits no edema  Mild chronic venous stasis   Neurological: She is alert and oriented to person, place, and time  Skin: Skin is warm and dry     Psychiatric: She has a normal mood and affect        ____________________________________________________________________    Labs:   CBC: Results from last 7 days   Lab Units 01/24/20  0607 01/23/20  1849   WBC Thousand/uL 10 35* 11 24*   HEMOGLOBIN g/dL 10 5* 11 7   HEMATOCRIT % 34 3* 39 1   MCV fL 87 88   PLATELETS Thousands/uL 263 288     CMP: Results from last 7 days   Lab Units 01/24/20  0607 01/23/20  1849   POTASSIUM mmol/L 3 6 4 4   CHLORIDE mmol/L 99* 99*   CO2 mmol/L 38* 38*   BUN mg/dL 16 15   CREATININE mg/dL 0 39* 0 30*   CALCIUM mg/dL 8 3 9 4   AST U/L 14 18   ALT U/L 16 19   ALK PHOS U/L 106 118*   EGFR ml/min/1 73sq m 94 103     Magnesium:   Results from last 7 days   Lab Units 20  0607   MAGNESIUM mg/dL 1 9     Phosphorous:   Results from last 7 days   Lab Units 20  0607   PHOSPHORUS mg/dL 3 3     Troponin:   Results from last 7 days   Lab Units 20  1849   TROPONIN I ng/mL <0 02     PT/INR:   Results from last 7 days   Lab Units 20  1849   PTT seconds 30   INR  0 94     Lactic Acid:     BNP:   Results from last 7 days   Lab Units 20  1849   NT-PRO BNP pg/mL 177     ABG: Results from last 7 days   Lab Units 20  0544 20  1912   [place] ART  7 405 7  393   PCO2 ART mm Hg 63 5* 62 8*   PO2 ART mm Hg 120 6 110 1   HCO3 ART mmol/L 38 9* 37 4*   ABG SOURCE  Radial, Right Radial, Left      Procalcitonin: Invalid input(s): PROCALCITONIN    Imaging:   XR chest 1 view portable   ED Interpretation by Ryan Garcia MD ( [id number])   NAD, CM, pt  rotated      Final Result by Alihsa Fagan MD ( [id number])      Tiny right pleural effusion  Otherwise, clear lungs              Workstation performed: [id number]             PFT: NA    EKG/ECHO: [address]  [address] [facility], [facility]  [phone]     Transthoracic Echocardiogram  Limited 2D, Doppler, and Color Doppler     Study date:  2019     Patient: Jesenia Ortega  MR number: [id number]  Account number: [de-identified]  : 10-Aug-1931  Age: 80 years  Gender: Female  Status: Inpatient  Location:  Height: 67 in  Weight: 230 6 lb  BP: 141/ 65 mmHg     Indications: tachycardia     Diagnoses: R00 0 - Tachycardia, unspecified     Sonographer:  ENEDINA Tay  Primary Physician:  Merari Mancini MD  Group:  [facility]  Interpreting Physician:  Colletta Croissant, MD  Generalized Osteoarthritis, HTN, Obesity, Muscular Dystrophy, Obstructive sleep apnea, Hypoxia,Hyponatremia, CHF      SUMMARY     LEFT VENTRICLE:  Systolic function was normal  Ejection fraction was estimated in the range of 55 % to 60 %   Although no diagnostic regional wall motion abnormality was identified, this possibility cannot be completely excluded on the basis of this study  Wall thickness was increased  There was mild concentric hypertrophy      RIGHT VENTRICLE:  The size was at the upper limits of normal      LEFT ATRIUM:  The atrium was mildly dilated      RIGHT ATRIUM:  The atrium was mildly dilated      MITRAL VALVE:  There was mild annular calcification  There was mild regurgitation      PROCEDURE: The transthoracic approach was used  The study included limited 2D imaging, limited spectral Doppler, and color Doppler  The heart rate was 88 bpm, at the start of the study  Intravenous contrast (Definity solution [1 3 ml  Definity/8 7ml normal saline solution], 5 ml) was administered to evaluate myocardial perfusion and opacify the left ventricle      LEFT VENTRICLE: Size was normal  Systolic function was normal  Ejection fraction was estimated in the range of 55 % to 60 %  Although no diagnostic regional wall motion abnormality was identified, this possibility cannot be completely  excluded on the basis of this study  Wall thickness was increased  There was mild concentric hypertrophy  DOPPLER: The study was not technically sufficient to allow evaluation of LV diastolic function      RIGHT VENTRICLE: The size was at the upper limits of normal  Systolic function was normal      LEFT ATRIUM: The atrium was mildly dilated      RIGHT ATRIUM: The atrium was mildly dilated      MITRAL VALVE: There was mild annular calcification  Not well visualized  DOPPLER: There was no evidence for stenosis  There was mild regurgitation      AORTIC VALVE: The valve was probably trileaflet  Leaflets exhibited mildly increased thickness and mild calcification  DOPPLER: There was no evidence for stenosis   There was no significant regurgitation      TRICUSPID VALVE: Not well visualized      PULMONIC VALVE: Not assessed      PERICARDIUM: There was no thickening or calcification  There was no pericardial effusion      AORTA: The root exhibited normal size      SYSTEM MEASUREMENT TABLES     2D mode  AoR Diam 2D: 3 3 cm  LA Diam (2D): 2 9 cm  LA/Ao (2D): 0 88  FS (2D Teich): 27 2 %  IVSd (2D): 1 04 cm  LVDEV: 44 5 cm³  LVESV: 20 4 cm³  LVIDd(2D): 3 31 cm  LVISd (2D): 2 41 cm  LVOT Area 2D: 2 84 cm squared  LVPWd (2D): 1 1 cm  SV (Teich): 24 1 cm³     Unspecified Scan Mode  LVOT Diam : 1 9 cm     [facility] Accredited Echocardiography Laboratory     Prepared and electronically signed by  Earlnie Jenkins MD  Signed 26-Feb-2019 18:38:56       Micro: Lab Results   Component Value Date    BLOODCX No Growth After 5 Days  04/26/2018    BLOODCX No Growth After 5 Days  04/26/2018    BLOODCX No Growth After 5 Days  01/27/2016    BLOODCX No Growth After 5 Days  01/27/2016    URINECX >100,000 cfu/ml Escherichia coli (A) 04/26/2018    URINECX Culture results to follow   07/28/2016    URINECX >100,000 cfu/ml Escherichia coli 07/28/2016    URINECX 40,000-49,000 cfu/ml Klebsiella pneumoniae 07/28/2016    SPUTUMCULTUR 3+ Growth of Mixed Respiratory Antonella 01/31/2016    MRSACULTURE  02/22/2019     No Methicillin Resistant Staphlyococcus aureus (MRSA) isolated        ____________________________________________________________________

## 2020-01-24 NOTE — CONSULTS
Consult Note - Wound   Joshua Kat 80 y o  female MRN: [id number]  Unit/Bed#: [address] [address] Encounter: [id number]      Assessment:   I reviewed the images of the skin breakdown on the patient's buttocks and discussed her care with Wesley Simmons RN  Nursing staff currently applying Calazime on wounds and then covering with a bordered foam dressing  This is very appropriate and per the wound treatment guidelines  Staff is turning and repositioning  Using wedges  Offloading heels  Wound/Skin Care Plan:   1  I will see the patient on Monday, 1/27 if she is still here  2  Frequently turn and reposition  Use wedges  3  Calazime to wounds on buttocks and then cover with a bordered foam dressing  Change every 3 days and prn    4  Offload heels  5  Moisturize skin each day  Discussed with Patricia and Dr Hillary Rodriguez  Vitals: Blood pressure 115/65, pulse 79, temperature 98 7 °F (37 1 °C), resp  rate 18, height 5' 6" (1 676 m), weight 104 kg (228 lb 9 9 oz), SpO2 98 %, not currently breastfeeding  ,Body mass index is 36 9 kg/m²  Wound 01/23/20 Buttocks Right (Active)   Wound Image   1/23/2020 10:37 PM   Wound Description Beefy red;Fragile 1/24/2020  9:30 AM   Staging Stage II 1/24/2020  9:30 AM   Treasure-wound Assessment Fragile 1/24/2020  9:30 AM   Drainage Amount None 1/24/2020  9:30 AM   Treatments Site care 1/23/2020 10:47 PM   Dressing Foam, Silicon (eg  Allevyn, etc) 1/24/2020  9:30 AM   Dressing Changed New 1/23/2020 10:47 PM   Patient Tolerance Tolerated well 1/23/2020 10:47 PM   Dressing Status Clean;Dry; Intact 1/24/2020  9:30 AM       Wound 01/23/20 Buttocks Left (Active)   Wound Image   1/23/2020 10:37 PM   Wound Description Beefy red;Fragile 1/24/2020  9:30 AM   Staging Stage II 1/24/2020  9:30 AM   Treasure-wound Assessment Fragile 1/24/2020  9:30 AM   Drainage Amount None 1/24/2020  9:30 AM   Dressing Foam, Silicon (eg  Allevyn, etc) 1/24/2020  9:30 AM   Wound packed?  No 1/23/2020 10:47 PM Dressing Changed New 1/23/2020 10:47 PM   Patient Tolerance Tolerated well 1/23/2020 10:47 PM   Dressing Status Clean;Dry; Intact 1/24/2020  9:30 AM

## 2020-01-24 NOTE — ASSESSMENT & PLAN NOTE
Patient presents with altered mental status x1 day which could be secondary to UTI  Urine micro:  4-10 WBC, moderate bacteria    WBC 11 24  · Urine culture pending  · Continue Rocephin 1 g IV v- consider transition to PO Bactrim on discharge

## 2020-01-24 NOTE — OCCUPATIONAL THERAPY NOTE
OT Evaluation       01/24/20 1115   Note Type   Note type Eval only   Restrictions/Precautions   Other Precautions Fall Risk   Pain Assessment   Pain Assessment No/denies pain   Pain Score No Pain   Home Living   Type of [address] [facility] entrance; Two level; Able to live on main level with bedroom/bathroom   [address] [facility] bed; Wheelchair-manual  (Kevin Lift)   Prior Function   Level of Grand Total dependent   Lives With [employer] Help From Family   ADL Assistance Needs assistance   Comments Patient's family cares for her  She is Kevin Lift OOB daily with assist from family  Patient is dependent for mobility, requires total assist for bathing and dressing   Patient is able to feed herself with set up assistance   ADL   Eating Assistance 3  Moderate Assistance   Grooming Assistance 1  Total Assistance   [address] 1  Total Assistance   LB Bathing Assistance 1  Total Assistance   [address] 1  Total Assistance   LB Dressing Assistance 1  Total [address]  1  Total [address] 1  Total Assistance   Bed Mobility   Rolling R 1  Dependent   Rolling L 1  Dependent   Transfers   Additional Comments Not assessed, patient does not stand at baseline   Activity Tolerance   Activity Tolerance Patient tolerated treatment well   RUE Assessment   RUE Assessment X   RUE Overall AROM   R Shoulder Flexion Severely limited, 0-35*   R Elbow Flexion WFL   R Elbow Extension WFL   R Wrist Flexion WFL   R Wrist Extension WFL   R Mass Grasp WFL   RUE Overall PROM   R Shoulder Flexion WFL   R Elbow Flexion WFL   R Elbow Extension WFL   R Wrist Flexion WFL   R Wrist Extension WFL   R Mass Grasp WFL   LUE Assessment   LUE Assessment X   LUE Overall AROM   L Shoulder Flexion Severely limited   L Elbow Flexion Moderately limited   L Wrist Flexion WFL   L Wrist Extension WFL   L Mass Grasp WFL   LUE Overall PROM   L Shoulder Flexion WFL   L Elbow Flexion WFL   L Elbow Extension WFL   L Wrist Flexion WFL   L Wrist Extension WFL   L Mass Grasp WFL   Cognition   Overall Cognitive Status WFL   Arousal/Participation Alert; Cooperative   Attention Within functional limits   Orientation Level Oriented X4   Following Commands Follows one step commands without difficulty   Assessment   Limitation Decreased ADL status; Decreased UE ROM; Decreased UE strength;Decreased endurance   Prognosis Good   Assessment Patient evaluated by Occupational Therapy  Patient admitted with Acute on chronic respiratory failure with hypoxia and hypercapnia ([facility] [address] )  The patients occupational profile, medical and therapy history includes a extensive additional review of physical, cognitive, or psychosocial history related to current functional performance  Comorbidities affecting functional mobility and ADLS include: CHF, arthritis, MRSA, HTN, obesity, muscular dystrophy  Prior to admission, patient was dependent for mobility, requiring assist for ADLS and having 24 hour care   The evaluation identifies the following performance deficits: weakness, decreased ROM, decreased ADLS and decreased strength, that result in activity limitations and/or participation restrictions  This evaluation requires clinical decision making of high complexity, because the patient presents with comorbidites that affect occupational performance and required significant modification of tasks or assistance with consideration of multiple treatment options  The Barthel Index was used as a functional outcome tool presenting with a score of 30, indicating marked limitations of functional mobility and ADLS  Patient will benefit from skilled Occupational Therapy services to address above deficits and facilitate a safe return to prior level of function     Goals   Patient Goals "To strengthen my coffee arm!'   STG Time Frame   (1-7 days)   Short Term Goal #1 Patient will performed BUE HEP with moderate cueing   LTG Time Frame   (8-14 days)   Long Term Goal #1 Patient will perform BUE HEP with minimal cueing   ADL Goals   Pt Will Perform Eating   (STG min assist, LTG set-up assist)   Plan   Treatment Interventions ADL retraining;UE strengthening/ROM; Energy conservation; Endurance training   Goal Expiration Date 02/07/20   OT Frequency 2-3x/wk   Recommendation   OT Discharge Recommendation Home with family support   Barthel Index   Feeding 5   Bathing 0   Grooming Score 0   Dressing Score 0   Bladder Score 10   Bowels Score 10   Toilet Use Score 0   Transfers (Bed/Chair) Score 5   Mobility (Level Surface) Score 0   Stairs Score 0   Barthel Index Score 27   Licensure   [place] License Number  Roberta Severs, OTR/L [id number]

## 2020-01-24 NOTE — H&P
H&P Exam - Mauricio Retana 80 y o  female MRN: [id number]    Unit/Bed#: [address] [place] [address] Encounter: [id number]    Patient was admitted for observation under the care of Dr Patricia Mccormack  Patient's expected length of stay is less than 2 midnights  Patient is DNR DNI  Assessment/Plan     UTI (urinary tract infection)  Assessment & Plan  Patient presents with altered mental status x1 day which could be secondary to UTI  Urine micro:  4-10 WBC, moderate bacteria  WBC 11 24  · Urine culture  · Continue Rocephin 1 g IV    * Altered mental status  Assessment & Plan  Likely secondary to chronic respiratory failure  Not in distress on initial exam   Followed by Dr Nayeli Hester as an outpatint  · CXR on admission:   · BNP  · Flu/RSV  · O2 via nasal cannula  · DuoNebs q 6 scheduled, q 4h p r n  · Lasix 20 mg po daily  · BiPAP at night  · Vitals q4h    Chronic respiratory failure (HCC)  Assessment & Plan  Acute on chronic respiratory failure with hypercarbia  ABG admission:  pH 7 393, pCO2 62 8  · Continue with Breo Ellipta 100-25 q d  · Maintain oxygen saturation above 92%  · Consult pulmonology- Patient of Dr Duong Collier  · AM repeat ABG     Diastolic congestive heart failure [facility])  Assessment & Plan  Wt Readings from Last 3 Encounters:   02/28/19 103 kg (227 lb 1 2 oz)   04/26/18 125 kg (275 lb)   07/29/16 89 kg (196 lb 3 4 oz)   Echo 02/2019:  LV EF 55-60%; mildly dilated left and right atria; BNP on admission; CXR  · Continue home medication Lopressor 25 mg p o  B i d , diltiazem 240 mg p o  Q d  · Lasix 20 mg p o  · Telemetry  · Fluid restriction  · I&O  · Daily weights    PORTIA treated with BiPAP  Assessment & Plan  · BiPAP q h s  12/6    Essential hypertension  Assessment & Plan  BP admission 176/83  · Continue with home medication Lopressor 25 mg p o  B i d , continue home medication diltiazem 240 mg p o  Q d  Muscular dystrophy, limb girdle  Assessment & Plan  Patient is bedbound  Patient deconditioned       Generalized osteoarthritis  Assessment & Plan  · Will continue with celebrex 200 mg  · 1-25 Vitamin D level     ED course:  Rocephin 1 g IV x1  FEN:  Replete electrolytes as needed, Lovenox and SCDs      History of Present Illness     HPI:  Andreas Miles is a 80 y o  female past medical history chronic respiratory failure , PORTIA , diastolic congestive heart failure, hypertension , osteoarthritis , and muscular dystrophy who presents with altered mental status  Patient reports that she was having visual and auditory hallucination earlier this afternoon which usually happens when she is hypercarbic  Patient denies any fever, chills, headache or dizziness, chest pain, shortness of breath, loss of consciousness, abdominal pain, nausea/vomiting/diarrhea, dysuria hematuria  Per family member at bedside, altered mental status resolved upon presentation to the emergency room  PCP: Carlotta Miranda MD    Review of Systems   Constitutional: Negative for chills and fever  Respiratory: Negative for cough, shortness of breath and wheezing  Cardiovascular: Negative for chest pain, palpitations and leg swelling  Gastrointestinal: Negative for abdominal pain, diarrhea, nausea and vomiting  Genitourinary: Negative for dysuria and hematuria  Neurological: Negative for dizziness, syncope and headaches  Psychiatric/Behavioral: Positive for confusion and hallucinations  Historical Information   Past Medical History:   Diagnosis Date    Acute bronchitis     Arthritis     Asthmatic bronchitis     Resolved 10/4/2017     CHF (congestive heart failure) ([facility] [address] )     Community acquired pneumonia     Resolved 10/4/2017     Foot fracture, left     Last assessed 5/27/2014     History of being hospitalized 08/2015    Nondisplaced right femur fracture; [facility]    History of methicillin resistant staphylococcus aureus (MRSA)     7/29/2016 MRSA (nares) positive  Negative 2/21/2018   Isolation discontinued 2/25/2019    Hypertension     Morbid obesity ([facility] [address] )     Muscular dystrophy ([facility] [address] )     Thyroglossal duct cyst     Last assessed 5/27/2014      Past Surgical History:   Procedure Laterality Date    APPENDECTOMY      CHOLECYSTECTOMY      HYSTERECTOMY      IR PICC LINE  2/22/2019    THYROID CYST EXCISION      THYROID SURGERY      TOTAL ABDOMINAL HYSTERECTOMY      Last assessed 5/27/2014    VEIN SURGERY Bilateral      Social History   Social History     Substance and Sexual Activity   Alcohol Use Never    Alcohol/week: 0 0 standard drinks    Frequency: Never    Drinks per session: Patient refused    Binge frequency: Never     Social History     Substance and Sexual Activity   Drug Use No     Social History     Tobacco Use   Smoking Status Never Smoker   Smokeless Tobacco Never Used     Family History:   Family History   Problem Relation Age of Onset    No Known Problems Mother        Meds/Allergies   PTA meds:   Prior to Admission Medications   Prescriptions Last Dose Informant Patient Reported? Taking?   acetaminophen (TYLENOL) 325 mg tablet Past Month at Unknown time  No Yes   Sig: Take one or 2 every 6 hours as needed for pain   albuterol (ACCUNEB) 0 63 MG/3ML nebulizer solution 1/23/2020 at Unknown time  No Yes   Sig: Take 3 mL (0 63 mg total) by nebulization every 6 (six) hours as needed for wheezing or shortness of breath   celecoxib (CeleBREX) 200 mg capsule 1/22/2020 at Unknown time  No Yes   Sig: Take 1 capsule (200 mg total) by mouth daily   diltiazem (CARDIZEM CD) 240 mg 24 hr capsule 1/23/2020 at Unknown time  No Yes   Sig: Take 1 capsule (240 mg total) by mouth daily   fluocinonide (LIDEX) 0 05 % cream Not Taking at Unknown time  No No   Sig: Apply topically 2 (two) times a day   Patient not taking: Reported on 1/23/2020   fluticasone-vilanterol (BREO ELLIPTA) 100-25 mcg/inh inhaler Not Taking at Unknown time  No No   Sig: Inhale 1 puff daily Rinse mouth after use     Patient not taking: Reported on 1/23/2020   furosemide (LASIX) 20 mg tablet 1/22/2020 at Unknown time  No Yes   Sig: Take 1 tablet (20 mg total) by mouth daily Pt takes half a pill 3 x a week   Patient taking differently: Take 20 mg by mouth daily    ipratropium (ATROVENT) 0 02 % nebulizer solution 1/23/2020 at Unknown time  No Yes   Sig: Take 1 vial (0 5 mg total) by nebulization 3 (three) times a day   ipratropium-albuterol (DUO-NEB) 0 5-2 5 mg/3 mL nebulizer solution 1/23/2020 at Unknown time  No Yes   Sig: Take 1 vial (3 mL total) by nebulization every 6 (six) hours   metoprolol tartrate (LOPRESSOR) 25 mg tablet 1/23/2020 at Unknown time  No Yes   Sig: Take 1 tablet (25 mg total) by mouth every 12 (twelve) hours   nystatin (MYCOSTATIN) powder 1/23/2020 at Unknown time  No Yes   Sig: Apply topically 4 (four) times a day   silver sulfadiazine (SILVADENE,SSD) 1 % cream 1/23/2020 at Unknown time  No Yes   Sig: Apply topically daily   triamcinolone (KENALOG) 0 1 % cream Past Week at Unknown time  No Yes   Sig: Apply topically 2 (two) times a day      Facility-Administered Medications: None     No Known Allergies    Objective   Vitals: Blood pressure 157/69, pulse 85, temperature 98 5 °F (36 9 °C), temperature source Tympanic, resp  rate 18, SpO2 98 %, not currently breastfeeding  No intake or output data in the 24 hours ending 01/23/20 2208    Invasive Devices     Peripheral Intravenous Line            Peripheral IV 01/23/20 Right Antecubital less than 1 day                Physical Exam   Constitutional: She is oriented to person, place, and time  She appears well-developed and well-nourished  No distress  HENT:   Head: Normocephalic and atraumatic  Eyes: Pupils are equal, round, and reactive to light  Conjunctivae and EOM are normal    Neck: Normal range of motion  No JVD present  Cardiovascular: Normal rate, regular rhythm, normal heart sounds and intact distal pulses  Pulmonary/Chest: Effort normal  No respiratory distress  She has rales     Mild basilar rales   Abdominal: Soft  Bowel sounds are normal  She exhibits no distension  There is no tenderness  There is no rebound and no guarding  Neurological: She is alert and oriented to person, place, and time  No cranial nerve deficit  Skin: She is not diaphoretic  Psychiatric: She has a normal mood and affect  Her behavior is normal  Judgment and thought content normal    Nursing note and vitals reviewed        Lab Results:   Results for orders placed or performed during the hospital encounter of 01/23/20   Influenza A/B and RSV PCR   Result Value Ref Range    INFLUENZA A PCR None Detected None Detected    INFLUENZA B PCR None Detected None Detected    RSV PCR None Detected None Detected   Comprehensive metabolic panel   Result Value Ref Range    Sodium 141 136 - 145 mmol/L    Potassium 4 4 3 5 - 5 3 mmol/L    Chloride 99 (L) 100 - 108 mmol/L    CO2 38 (H) 21 - 32 mmol/L    ANION GAP 4 4 - 13 mmol/L    BUN 15 5 - 25 mg/dL    Creatinine 0 30 (L) 0 60 - 1 30 mg/dL    Glucose 105 65 - 140 mg/dL    Calcium 9 4 8 3 - 10 1 mg/dL    AST 18 5 - 45 U/L    ALT 19 12 - 78 U/L    Alkaline Phosphatase 118 (H) 46 - 116 U/L    Total Protein 7 1 6 4 - 8 2 g/dL    Albumin 3 1 (L) 3 5 - 5 0 g/dL    Total Bilirubin 0 10 (L) 0 20 - 1 00 mg/dL    eGFR 103 ml/min/1 73sq m   CBC and differential   Result Value Ref Range    WBC 11 24 (H) 4 31 - 10 16 Thousand/uL    RBC 4 45 3 81 - 5 12 Million/uL    Hemoglobin 11 7 11 5 - 15 4 g/dL    Hematocrit 39 1 34 8 - 46 1 %    MCV 88 82 - 98 fL    MCH 26 3 (L) 26 8 - 34 3 pg    MCHC 29 9 (L) 31 4 - 37 4 g/dL    RDW 14 9 11 6 - 15 1 %    MPV 9 6 8 9 - 12 7 fL    Platelets 334 365 - 771 Thousands/uL    nRBC 0 /100 WBCs    Neutrophils Relative 79 (H) 43 - 75 %    Immat GRANS % 0 0 - 2 %    Lymphocytes Relative 15 14 - 44 %    Monocytes Relative 6 4 - 12 %    Eosinophils Relative 0 0 - 6 %    Basophils Relative 0 0 - 1 %    Neutrophils Absolute 8 80 (H) 1 85 - 7 62 Thousands/µL    Immature Grans Absolute 0 03 0 00 - 0 20 Thousand/uL    Lymphocytes Absolute 1 70 0 60 - 4 47 Thousands/µL    Monocytes Absolute 0 66 0 17 - 1 22 Thousand/µL    Eosinophils Absolute 0 00 0 00 - 0 61 Thousand/µL    Basophils Absolute 0 05 0 00 - 0 10 Thousands/µL   Protime-INR   Result Value Ref Range    Protime 10 1 9 8 - 12 0 seconds    INR 0 94 0 91 - 1 09   APTT   Result Value Ref Range    PTT 30 25 - 32 seconds   Troponin I   Result Value Ref Range    Troponin I <0 02 <=0 04 ng/mL   UA (URINE) with reflex to Scope   Result Value Ref Range    Color, UA Light Yellow     Clarity, UA Slightly Cloudy     Specific Gravity, UA <=1 005 1 000 - 1 030    pH, UA 7 5 5 0, 5 5, 6 0, 6 5, 7 0, 7 5, 8 0, 8 5, 9 0    Leukocytes, UA Trace (A) Negative    Nitrite, UA Positive (A) Negative    Protein, UA Negative Negative mg/dl    Glucose, UA Negative Negative mg/dl    Ketones, UA Negative Negative mg/dl    Urobilinogen, UA 0 2 0 2, 1 0 E U /dl E U /dl    Bilirubin, UA Negative Negative    Blood, UA Negative Negative   Blood gas, arterial   Result Value Ref Range    pH, Arterial 7 393 7 350 - 7 450    PH ART TC 7 394 7 350 - 7 450    pCO2, Arterial 62 8 (HH) 36 0 - 44 0 mm Hg    PCO2 (TC) Arterial 62 5 (HH) 36 0 - 44 0 mm Hg    pO2, Arterial 110 1 75 0 - 129 0 mm Hg    PO2 (TC) Arterial 109 5 75 0 - 129 0 mm Hg    HCO3, Arterial 37 4 (H) 22 0 - 28 0 mmol/L    Base Excess, Arterial 10 3 mmol/L    O2 Content, Arterial 16 7 16 0 - 23 0 mL/dL    O2 HGB,Arterial  97 0 94 0 - 97 0 %    SOURCE Radial, Left     MANDO TEST Yes     Temperature 98 5 Degrees Fehrenheit    Nasal Cannula 2 lpm    Urine Microscopic   Result Value Ref Range    RBC, UA None Seen None Seen, 0-5 /hpf    WBC, UA 4-10 (A) None Seen, 0-5, 5-55, 5-65 /hpf    Epithelial Cells Moderate (A) None Seen, Occasional /hpf    Bacteria, UA Moderate (A) None Seen, Occasional /hpf    AMORPH URATES Moderate /hpf    MUCUS THREADS Occasional (A) None Seen   NT-BNP PRO   Result Value Ref Range NT-proBNP 177 <450 pg/mL       Imaging:   XR chest 1 view portable   ED Interpretation   NAD, CM, pt  rotated                EKG:  Normal sinus rhythm    Code Status: Level 3 - DNAR and DNI    Counseling / Coordination of Care  Total floor / unit time spent today 30 minutes  Greater than 50% of total time was spent with the patient and / or family counseling and / or coordination of care  --  Oscar Matthews,     "This note has been constructed using a voice recognition system  Therefore there may be syntax, spelling, and/or grammatical errors   Please call if you have any questions "

## 2020-01-24 NOTE — ASSESSMENT & PLAN NOTE
· Will continue with celebrex 200 mg  · Vitamin D level low at 8  · Started patient on 5000 U Vit D3 - will continue this regimen on discharge for a total of 8 weeks, then transition to maintenance 1000 U daily

## 2020-01-24 NOTE — DISCHARGE SUMMARY
[facility] Discharge Summary - [address] [facility] 80 y o  female MRN: [id number]    [address]  [facility] [address] [place] [address] Encounter: [id number]    BRIEF OVERVIEW  Admitting Provider: Aretha Harris MD  Discharge Provider: Aretha Harris MD    Discharge To: Home    [facility] Dr Beatris Arzola [facility]/2020  Things to address at first follow up visit:   1  Any more episodes of altered mental status  2  Proper genital urinary hygiene prevention for of future UTI  3  O2 - sat, on 2 L O2 nasal cannula at home  4  If she breathing okay, any dyspnea  5  Is she taking her vitamin-D     Labs and results pending at discharge:  1/24: Vit D- 8; WBC 10 3, HB 10,    1/23: ABG: ph 7 393, Co2 62 8, PO2 110, HCO3 37 4  UA: positive nitrites&Leuks  WBC 11 24 Flu  neg   CXR: no acute cardiopulm disease    Admission Date: 1/23/2020     Discharge Date: 1/24/2020    Primary Discharge Diagnosis  Principal Problem:    Acute on chronic respiratory failure with hypoxia and hypercapnia (HCC)  Active Problems:    Generalized osteoarthritis    Essential hypertension    Muscular dystrophy, limb girdle    Sacral ulcer (HCC)    PORTIA treated with BiPAP    Diastolic congestive heart failure ([address] )    UTI (urinary tract infection)  Resolved Problems:    Chronic respiratory failure ([address] )    Secondary Discharge Diagnoses  None  Consulting Providers   Pulmonary    DETAILS OF HOSPITAL STAY    Procedures Performed/Pertinent Test results  None     HPI  Fuentes Lawton is a 80 y o  female past medical history chronic respiratory failure, PORTIA , diastolic congestive heart failure, hypertension , osteoarthritis , and muscular dystrophy who presents with altered mental status  Patient reports that she was having visual and auditory hallucination earlier this afternoon which usually happens when she is hypercarbic   Patient denies any fever, chills, headache or dizziness, chest pain, shortness of breath, loss of consciousness, abdominal pain, nausea/vomiting/diarrhea, dysuria hematuria  Per family member at bedside, altered mental status resolved upon presentation to the emergency room  Rosario Snider is a 80 y o  female past medical history chronic respiratory failure , PORTIA , diastolic congestive heart failure, hypertension , osteoarthritis , and muscular dystrophy who presents with altered mental status  Patient reports that she was having visual and auditory hallucination earlier this afternoon which usually happens when she is hypercarbic  Patient denies any fever, chills, headache or dizziness, chest pain, shortness of breath, loss of consciousness, abdominal pain, nausea/vomiting/diarrhea, dysuria hematuria  Per family member at bedside, altered mental status resolved upon presentation to the emergency room  Hospital Course  Patient was admitted to the hospital for altered mental status in the setting of chronic respiratory failure and UTI  Pulmonology was consulted given hypercarbia but it was determined to be chronic and at baseline  Patient was started on Rocephin 1 g daily for treatment of UTI  Urine culture grew greater than 100,000 mixed contaminant  Patient was also found to be vitamin-D deficient and was started on vitamin-D supplementation while inpatient  Patient was discharged in stable condition and advised to continue all home meds in addition to completing 5 day course of Ceftin and starting daily vitamin-D supplementation       Physical Exam at Discharge  See physical exam from day of discharge     Medications    acetaminophen 325 mg tablet   Commonly known as: TYLENOL   Take one or 2 every 6 hours as needed for pain   Refills: 0          cefuroxime 250 mg tablet   Commonly known as: CEFTIN   Take 1 tablet (250 mg total) by mouth every 12 (twelve) hours for 5 days   Refills: 0          celecoxib 200 mg capsule   Commonly known as: CeleBREX   Take 1 capsule (200 mg total) by mouth daily   Refills: 3          diltiazem 240 mg 24 hr capsule   Commonly known as: CARDIZEM CD   Take 1 capsule (240 mg total) by mouth daily   Refills: 3          fluocinonide 0 05 % cream   Commonly known as: LIDEX   Apply topically 2 (two) times a day   Refills: 5          fluticasone-vilanterol 100-25 mcg/inh inhaler   Commonly known as: BREO ELLIPTA   Inhale 1 puff daily Rinse mouth after use  Refills: 0          furosemide 20 mg tablet   Commonly known as: LASIX   Take 1 tablet (20 mg total) by mouth daily Pt takes half a pill 3 x a week   Refills: 3   What changed: additional instructions          ipratropium-albuterol 0 5-2 5 mg/3 mL nebulizer solution   Commonly known as: DUO-NEB   Take 1 vial (3 mL total) by nebulization every 6 (six) hours   Refills: 6          metoprolol tartrate 25 mg tablet   Commonly known as: LOPRESSOR   Take 1 tablet (25 mg total) by mouth every 12 (twelve) hours   Refills: 3          nystatin powder   Commonly known as: MYCOSTATIN   Apply topically 4 (four) times a day   Refills: 3          silver sulfadiazine 1 % cream   Commonly known as: SILVADENE,SSD   Apply topically daily   Refills: 3          triamcinolone 0 1 % cream   Commonly known as: KENALOG   Apply topically 2 (two) times a day   Refills: 5          Vitamin D3 125 MCG (5000 UT) Tabs   Take 1 tablet (5,000 Units total) by mouth daily   Refills: 1           Allergies  No Known Allergies    Diet restrictions: As tolerated  Activity restrictions: As tolerated  Code Status: Level 3 - DNAR and DNI  Advance Directive and Living Will: <no information>  Power of :    POLST:      Discharge Condition: stable      Discharge  Statement   I spent 30 minutes discharging the patient  This time was spent on the day of discharge  I had direct contact with the patient on the day of discharge  Additional documentation is required if more than 30 minutes were spent on discharge

## 2020-01-24 NOTE — DISCHARGE INSTR - OTHER ORDERS
1  Frequently turn and reposition  Use wedges  3  Apply a bordered foam dressing to sacrum/buttocks  Change every 3 days and prn  If foam dressings unavailable, apply a thin layer of moisture barrier cream such as Desitin, A&E ointment two to three times a day  4  Offload heels  5  Moisturize skin each day

## 2020-01-24 NOTE — ASSESSMENT & PLAN NOTE
Patient is bedbound   Patient deconditioned, discussed with patient possible utility of physical therapy for upper limbs  · Will order PT/OT

## 2020-01-24 NOTE — UTILIZATION REVIEW
Initial Clinical Review    PATIENT WS OBSERVATION STATUS 1/23/20 CONVERTED TO INPATIENT ADMISSION 1/25/20 FOR CONTINUED TREATMENT OF UTI NEEDING IV ABX    Admission: Date/Time/Statement:   Orders Placed This Encounter   Procedures    Inpatient Admission     Standing Status:   Standing     Number of Occurrences:   1     Order Specific Question:   Admitting Physician     Answer:   Yvonne Cleveland     Order Specific Question:   Level of Care     Answer:   Med Surg [16]     Order Specific Question:   Estimated length of stay     Answer:   More than 2 Midnights     Order Specific Question:   Certification     Answer:   I certify that inpatient services are medically necessary for this patient for a duration of greater than two midnights  See H&P and MD Progress Notes for additional information about the patient's course of treatment  ED Arrival Information     Expected Arrival Acuity Means of Arrival Escorted By Service Admission Type    - 1/23/2020 17:53 Urgent Ambulance [facility] Urgent    Arrival Complaint    Difficulty breathing        Chief Complaint   Patient presents with    Shortness of Breath     pt daughter called 911 for SOB and auditory/visual hallucinations which dtr reports normally happens when oxygen levels drop     Assessment/Plan: 80 y o  female past medical history chronic respiratory failure , PORTIA , diastolic congestive heart failure, hypertension , osteoarthritis , and muscular dystrophy who presents with altered mental status  Patient reports that she was having visual and auditory hallucination earlier this afternoon which usually happens when she is hypercarbic  Patient denies any fever, chills, headache or dizziness, chest pain, shortness of breath, loss of consciousness, abdominal pain, nausea/vomiting/diarrhea, dysuria hematuria  Per family member at bedside, altered mental status resolved upon presentation to the emergency room    UTI (urinary tract infection)  Assessment & Plan  Patient presents with altered mental status x1 day which could be secondary to UTI  Urine micro:  4-10 WBC, moderate bacteria  WBC 11 24  · Urine culture  · Continue Rocephin 1 g IV  * Altered mental status  Assessment & Plan  Likely secondary to chronic respiratory failure  Not in distress on initial exam   Followed by Dr Santos Duvall as an outpatint  · CXR on admission:   · BNP  · Flu/RSV  · O2 via nasal cannula  · DuoNebs q 6 scheduled, q 4h p r n  · Lasix 20 mg po daily  · BiPAP at night  · Vitals q4h  Chronic respiratory failure (HCC)  Assessment & Plan  Acute on chronic respiratory failure with hypercarbia  ABG admission:  pH 7 393, pCO2 62 8  · Continue with Breo Ellipta 100-25 q d  · Maintain oxygen saturation above 92%  · Consult pulmonology- Patient of Dr Ames Lundborg  · AM repeat ABG   Diastolic congestive heart failure [facility])  Assessment & Plan      Wt Readings from Last 3 Encounters:   02/28/19 103 kg (227 lb 1 2 oz)   04/26/18 125 kg (275 lb)   07/29/16 89 kg (196 lb 3 4 oz)   Echo 02/2019:  LV EF 55-60%; mildly dilated left and right atria; BNP on admission; CXR  · Continue home medication Lopressor 25 mg p o  B i d , diltiazem 240 mg p o  Q d  · Lasix 20 mg p o  · Telemetry  · Fluid restriction  · I&O  · Daily weights  PORTIA treated with BiPAP  Assessment & Plan  · BiPAP q h s  12/6  Essential hypertension  Assessment & Plan  BP admission 176/83  · Continue with home medication Lopressor 25 mg p o  B i d , continue home medication diltiazem 240 mg p o  Q d  Muscular dystrophy, limb girdle  Assessment & Plan  Patient is bedbound  Patient deconditioned     Generalized osteoarthritis  Assessment & Plan  · Will continue with celebrex 200 mg  · 1-25 Vitamin D level        ED Triage Vitals   Temperature Pulse Respirations Blood Pressure SpO2   01/23/20 1804 01/23/20 1804 01/23/20 1804 01/23/20 1804 01/23/20 1804   98 5 °F (36 9 °C) (!) 110 18 (!) 176/83 99 %      Temp Source Heart Rate Source Patient Position - Orthostatic VS BP Location FiO2 (%)   01/23/20 1804 01/23/20 1804 01/23/20 1804 01/23/20 1804 01/24/20 0507   Tympanic Monitor Lying Left arm 30      Pain Score       01/23/20 1800       No Pain        Wt Readings from Last 1 Encounters:   01/24/20 104 kg (228 lb 9 9 oz)     Additional Vital Signs:   01/24/20 04:13:36  98 4 °F (36 9 °C)  82  20  117/54  75  97 %       01/23/20 23:46:23  98 2 °F (36 8 °C)  82  18  129/64  86  98 %    Lying   01/23/20 2244  97 8 °F (36 6 °C)  93  17  123/70        Lying   01/23/20 2126            98 %  Nasal cannula     01/23/20 20:44:13    85  18  157/69  98  99 %  Nasal cannula  Lying   01/23/20 2015    84  24Abnormal   152/77    99 %  Nasal cannula        Pertinent Labs/Diagnostic Test Results:   cxr Tiny right pleural effusion  Results from last 7 days   Lab Units 01/24/20  0607 01/23/20  1849   WBC Thousand/uL 10 35* 11 24*   HEMOGLOBIN g/dL 10 5* 11 7   HEMATOCRIT % 34 3* 39 1   PLATELETS Thousands/uL 263 288   NEUTROS ABS Thousands/µL  --  8 80*     Results from last 7 days   Lab Units 01/24/20  0607 01/23/20  1849   SODIUM mmol/L 139 141   POTASSIUM mmol/L 3 6 4 4   CHLORIDE mmol/L 99* 99*   CO2 mmol/L 38* 38*   ANION GAP mmol/L 2* 4   BUN mg/dL 16 15   CREATININE mg/dL 0 39* 0 30*   EGFR ml/min/1 73sq m 94 103   CALCIUM mg/dL 8 3 9 4   MAGNESIUM mg/dL 1 9  --    PHOSPHORUS mg/dL 3 3  --      Results from last 7 days   Lab Units 01/24/20  0607 01/23/20  1849   AST U/L 14 18   ALT U/L 16 19   ALK PHOS U/L 106 118*   TOTAL PROTEIN g/dL 6 5 7 1   ALBUMIN g/dL 2 8* 3 1*   TOTAL BILIRUBIN mg/dL 0 20 0 10*         Results from last 7 days   Lab Units 01/24/20  0607 01/23/20  1849   GLUCOSE RANDOM mg/dL 105 105     Results from last 7 days   Lab Units 01/24/20  0544 01/23/20  1912   PH ART  7 405 7  393   PCO2 ART mm Hg 63 5* 62 8*   PO2 ART mm Hg 120 6 110 1   HCO3 ART mmol/L 38 9* 37 4*   BASE EXC ART mmol/L 11 9 10 3   O2 CONTENT ART mL/dL 15 8* 16 7   O2 HGB, ARTERIAL % 97 2* 97 0   ABG SOURCE  Radial, Right Radial, Left     Results from last 7 days   Lab Units 01/23/20  1849   TROPONIN I ng/mL <0 02         Results from last 7 days   Lab Units 01/23/20  1849   PROTIME seconds 10 1   INR  0 94   PTT seconds 30     Results from last 7 days   Lab Units 01/23/20  1849   NT-PRO BNP pg/mL 177     Results from last 7 days   Lab Units 01/23/20  1858   CLARITY UA  Slightly Cloudy   COLOR UA  Light Yellow   SPEC GRAV UA  <=1 005   PH UA  7 5   GLUCOSE UA mg/dl Negative   KETONES UA mg/dl Negative   BLOOD UA  Negative   PROTEIN UA mg/dl Negative   NITRITE UA  Positive*   BILIRUBIN UA  Negative   UROBILINOGEN UA E U /dl 0 2   LEUKOCYTES UA  Trace*   WBC UA /hpf 4-10*   RBC UA /hpf None Seen   BACTERIA UA /hpf Moderate*   EPITHELIAL CELLS WET PREP /hpf Moderate*   MUCUS THREADS  Occasional*     Results from last 7 days   Lab Units 01/23/20  1922   INFLUENZA A PCR  None Detected   RSV PCR  None Detected     ED Treatment:   Medication Administration from 01/23/2020 1753 to 01/23/2020 2040       Date/Time Order Dose Route Action Action by Comments     01/23/2020 2015 cefTRIAXone (ROCEPHIN) IVPB (premix) 1,000 mg 0 mg Intravenous Stopped Marva Ritter RN      01/23/2020 1934 cefTRIAXone (ROCEPHIN) IVPB (premix) 1,000 mg 1,000 mg Intravenous New Bag Marva Ritter RN         Past Medical History:   Diagnosis Date    Acute bronchitis     Arthritis     Asthmatic bronchitis     Resolved 10/4/2017     CHF [facility]congestive heart failure) ([facility] )     Community acquired pneumonia     Resolved 10/4/2017     Foot fracture, left     Last assessed 5/27/2014     History of being hospitalized 08/2015    Nondisplaced right femur fracture; [facility]    History of methicillin resistant staphylococcus aureus (MRSA)     7/29/2016 MRSA (nares) positive  Negative 2/21/2018   Isolation discontinued 2/25/2019    Hypertension     Morbid obesity ([facility] )     Muscular dystrophy ([facility] )     Thyroglossal duct cyst     Last assessed 5/27/2014      Present on Admission:   Essential hypertension   Generalized osteoarthritis   Muscular dystrophy, limb girdle   PORTIA treated with BiPAP   Acute on chronic respiratory failure with hypercapnia (HCC)   Chronic respiratory failure (HCC)   Diastolic congestive heart failure (HCC)   UTI (urinary tract infection)      Admitting Diagnosis: Chronic respiratory failure (HCC) [J96 10]  Shortness of breath [R06 02]  Hypercarbia [R06 89]  UTI (urinary tract infection) [N39 0]  Altered mental status [R41 82]  Difficulty breathing [R06 89]  Age/Sex: 80 y o  female  Admission Orders:    I/o  Daily weight  Vit d25  Urine cx  mrsa cx  bipap  Scheduled Medications:    Medications:  cefTRIAXone 1,000 mg Intravenous Q24H   celecoxib 200 mg Oral Daily   diltiazem 240 mg Oral Daily   enoxaparin 40 mg Subcutaneous Daily   fluticasone-vilanterol 1 puff Inhalation Daily   ipratropium-albuterol 3 mL Nebulization Q6H   magnesium sulfate 1 g Intravenous Once   metoprolol tartrate 25 mg Oral Q12H [address]   potassium chloride 40 mEq Oral Once     Continuous IV Infusions:     PRN Meds:    acetaminophen 650 mg Oral Q6H PRN       IP CONSULT TO PULMONOLOGY  IP CONSULT TO CASE MANAGEMENT    Network Utilization Review Department  [email or web address]  ATTENTION: Please call with any questions or concerns to [phone] and carefully listen to the prompts so that you are directed to the right person  All voicemails are confidential   Margarita Escudero all requests for admission clinical reviews, approved or denied determinations and any other requests to dedicated fax number below belonging to the campus where the patient is receiving treatment   List of dedicated fax numbers for the Facilities:  FACILITY NAME UR FAX NUMBER   ADMISSION DENIALS (Administrative/Medical Necessity) [phone]   [address] (Maternity/NICU/Pediatrics) [place] [phone]   Zackery Duran [phone]   Carina Rosalind [phone]   [address] [phone]   [facility]  [phone]   [address] [facility]  [address] [facility] [address] [facility] [phone]

## 2020-01-25 VITALS
HEIGHT: 66 IN | DIASTOLIC BLOOD PRESSURE: 63 MMHG | HEART RATE: 108 BPM | SYSTOLIC BLOOD PRESSURE: 120 MMHG | RESPIRATION RATE: 17 BRPM | TEMPERATURE: 98.2 F | WEIGHT: 232.7 LBS | OXYGEN SATURATION: 96 % | BODY MASS INDEX: 37.4 KG/M2

## 2020-01-25 LAB
ANION GAP SERPL CALCULATED.3IONS-SCNC: 2 MMOL/L (ref 4–13)
BACTERIA UR CULT: NORMAL
BASOPHILS # BLD AUTO: 0.02 THOUSANDS/ΜL (ref 0–0.1)
BASOPHILS NFR BLD AUTO: 0 % (ref 0–1)
BUN SERPL-MCNC: 17 MG/DL (ref 5–25)
CALCIUM SERPL-MCNC: 8.6 MG/DL (ref 8.3–10.1)
CHLORIDE SERPL-SCNC: 100 MMOL/L (ref 100–108)
CO2 SERPL-SCNC: 37 MMOL/L (ref 21–32)
CREAT SERPL-MCNC: 0.44 MG/DL (ref 0.6–1.3)
EOSINOPHIL # BLD AUTO: 0 THOUSAND/ΜL (ref 0–0.61)
EOSINOPHIL NFR BLD AUTO: 0 % (ref 0–6)
ERYTHROCYTE [DISTWIDTH] IN BLOOD BY AUTOMATED COUNT: 15.1 % (ref 11.6–15.1)
GFR SERPL CREATININE-BSD FRML MDRD: 90 ML/MIN/1.73SQ M
GLUCOSE P FAST SERPL-MCNC: 117 MG/DL (ref 65–99)
GLUCOSE SERPL-MCNC: 117 MG/DL (ref 65–140)
HCT VFR BLD AUTO: 36.6 % (ref 34.8–46.1)
HGB BLD-MCNC: 11 G/DL (ref 11.5–15.4)
IMM GRANULOCYTES # BLD AUTO: 0.02 THOUSAND/UL (ref 0–0.2)
IMM GRANULOCYTES NFR BLD AUTO: 0 % (ref 0–2)
LYMPHOCYTES # BLD AUTO: 1.99 THOUSANDS/ΜL (ref 0.6–4.47)
LYMPHOCYTES NFR BLD AUTO: 23 % (ref 14–44)
MAGNESIUM SERPL-MCNC: 2.4 MG/DL (ref 1.6–2.6)
MCH RBC QN AUTO: 26.2 PG (ref 26.8–34.3)
MCHC RBC AUTO-ENTMCNC: 30.1 G/DL (ref 31.4–37.4)
MCV RBC AUTO: 87 FL (ref 82–98)
MONOCYTES # BLD AUTO: 0.59 THOUSAND/ΜL (ref 0.17–1.22)
MONOCYTES NFR BLD AUTO: 7 % (ref 4–12)
MRSA NOSE QL CULT: NORMAL
NEUTROPHILS # BLD AUTO: 5.95 THOUSANDS/ΜL (ref 1.85–7.62)
NEUTS SEG NFR BLD AUTO: 70 % (ref 43–75)
NRBC BLD AUTO-RTO: 0 /100 WBCS
PHOSPHATE SERPL-MCNC: 3.3 MG/DL (ref 2.3–4.1)
PLATELET # BLD AUTO: 235 THOUSANDS/UL (ref 149–390)
PMV BLD AUTO: 9.2 FL (ref 8.9–12.7)
POTASSIUM SERPL-SCNC: 4.4 MMOL/L (ref 3.5–5.3)
RBC # BLD AUTO: 4.2 MILLION/UL (ref 3.81–5.12)
SODIUM SERPL-SCNC: 139 MMOL/L (ref 136–145)
WBC # BLD AUTO: 8.57 THOUSAND/UL (ref 4.31–10.16)

## 2020-01-25 PROCEDURE — G0009 ADMIN PNEUMOCOCCAL VACCINE: HCPCS | Performed by: FAMILY MEDICINE

## 2020-01-25 PROCEDURE — 99232 SBSQ HOSP IP/OBS MODERATE 35: CPT | Performed by: INTERNAL MEDICINE

## 2020-01-25 PROCEDURE — 80048 BASIC METABOLIC PNL TOTAL CA: CPT | Performed by: STUDENT IN AN ORGANIZED HEALTH CARE EDUCATION/TRAINING PROGRAM

## 2020-01-25 PROCEDURE — 84100 ASSAY OF PHOSPHORUS: CPT | Performed by: STUDENT IN AN ORGANIZED HEALTH CARE EDUCATION/TRAINING PROGRAM

## 2020-01-25 PROCEDURE — 85025 COMPLETE CBC W/AUTO DIFF WBC: CPT | Performed by: STUDENT IN AN ORGANIZED HEALTH CARE EDUCATION/TRAINING PROGRAM

## 2020-01-25 PROCEDURE — 94640 AIRWAY INHALATION TREATMENT: CPT

## 2020-01-25 PROCEDURE — 83735 ASSAY OF MAGNESIUM: CPT | Performed by: STUDENT IN AN ORGANIZED HEALTH CARE EDUCATION/TRAINING PROGRAM

## 2020-01-25 PROCEDURE — 99238 HOSP IP/OBS DSCHRG MGMT 30/<: CPT | Performed by: FAMILY MEDICINE

## 2020-01-25 PROCEDURE — NC001 PR NO CHARGE: Performed by: FAMILY MEDICINE

## 2020-01-25 PROCEDURE — 90670 PCV13 VACCINE IM: CPT | Performed by: FAMILY MEDICINE

## 2020-01-25 PROCEDURE — 94760 N-INVAS EAR/PLS OXIMETRY 1: CPT

## 2020-01-25 RX ORDER — CEFUROXIME AXETIL 250 MG/1
250 TABLET ORAL EVERY 12 HOURS SCHEDULED
Qty: 10 TABLET | Refills: 0 | Status: SHIPPED | OUTPATIENT
Start: 2020-01-25 | End: 2020-01-30

## 2020-01-25 RX ADMIN — VITAMIN D, TAB 1000IU (100/BT) 1000 UNITS: 25 TAB at 08:30

## 2020-01-25 RX ADMIN — ENOXAPARIN SODIUM 40 MG: 40 INJECTION SUBCUTANEOUS at 08:30

## 2020-01-25 RX ADMIN — IPRATROPIUM BROMIDE AND ALBUTEROL SULFATE 3 ML: 2.5; .5 SOLUTION RESPIRATORY (INHALATION) at 13:10

## 2020-01-25 RX ADMIN — FLUTICASONE FUROATE AND VILANTEROL TRIFENATATE 1 PUFF: 100; 25 POWDER RESPIRATORY (INHALATION) at 08:30

## 2020-01-25 RX ADMIN — METOPROLOL TARTRATE 25 MG: 25 TABLET ORAL at 08:30

## 2020-01-25 RX ADMIN — PNEUMOCOCCAL 13-VALENT CONJUGATE VACCINE 0.5 ML: 2.2; 2.2; 2.2; 2.2; 2.2; 4.4; 2.2; 2.2; 2.2; 2.2; 2.2; 2.2; 2.2 INJECTION, SUSPENSION INTRAMUSCULAR at 14:58

## 2020-01-25 RX ADMIN — FUROSEMIDE 20 MG: 20 TABLET ORAL at 08:30

## 2020-01-25 RX ADMIN — DILTIAZEM HYDROCHLORIDE 240 MG: 240 CAPSULE, COATED, EXTENDED RELEASE ORAL at 08:33

## 2020-01-25 RX ADMIN — CELECOXIB 200 MG: 100 CAPSULE ORAL at 08:30

## 2020-01-25 RX ADMIN — IPRATROPIUM BROMIDE AND ALBUTEROL SULFATE 3 ML: 2.5; .5 SOLUTION RESPIRATORY (INHALATION) at 07:31

## 2020-01-25 RX ADMIN — IPRATROPIUM BROMIDE AND ALBUTEROL SULFATE 3 ML: 2.5; .5 SOLUTION RESPIRATORY (INHALATION) at 02:04

## 2020-01-25 RX ADMIN — NYSTATIN 1 APPLICATION: 100000 POWDER TOPICAL at 14:33

## 2020-01-25 NOTE — PROGRESS NOTES
Progress Note - Pulmonary   Jonathony [place] 80 y o  female MRN: [id number]  Unit/Bed#: [address] [address] Encounter: [id number]    Assessment:  Altered mental status resolved  This was likely due to UTI  Chronic hypercapnic hypoxemic respiratory failure secondary to obesity alveolar hypoventilation possibly due to some muscle weakness from her muscular dystrophy  Plan:  I spoke with daughter Arnaldo Brady  Patient will resume her BiPAP settings of 10/5 with 2 L of oxygen at bedtime  I will check with [facility] on Monday we whether patient is elevated for new BiPAP machine  Her machine may be greater than 11years old  She will use nebulized DuoNeb q i d  Through the week and then can change to as needed thereafter     Patient has been of some mild chronic bronchitis and has been using neb treatments with DuoNeb periodically at home      Subjective:   Patient is feeling okay  She denies any shortness of breath  Objective:     Vitals: Blood pressure 120/63, pulse (!) 108, temperature 98 2 °F (36 8 °C), temperature source Oral, resp  rate 17, height 5' 6" (1 676 m), weight 106 kg (232 lb 11 2 oz), SpO2 98 %, not currently breastfeeding  ,Body mass index is 37 56 kg/m²  Intake/Output Summary (Last 24 hours) at 1/25/2020 1320  Last data filed at 1/24/2020 2054  Gross per 24 hour   Intake 50 ml   Output    Net 50 ml       Physical Exam: Physical Exam   Constitutional: She is oriented to person, place, and time  She appears well-developed and well-nourished  No distress  Patient is awake alert appropriate  On 2 L of oxygen O2 saturation 98%   HENT:   Head: Normocephalic  Nose: Nose normal    Mouth/Throat: Oropharynx is clear and moist  No oropharyngeal exudate  Eyes: Pupils are equal, round, and reactive to light  Conjunctivae are normal    Neck: Neck supple  No JVD present  No tracheal deviation present  Cardiovascular: Normal rate, regular rhythm and normal heart sounds     Pulmonary/Chest: Effort normal    Lung sounds are clear   Abdominal: Soft  She exhibits no distension  There is no tenderness  There is no guarding  Musculoskeletal: She exhibits no edema  Lymphadenopathy:     She has no cervical adenopathy  Neurological: She is alert and oriented to person, place, and time  Skin: Skin is warm and dry  No rash noted  Psychiatric: She has a normal mood and affect  Her behavior is normal  Thought content normal         Labs: I have personally reviewed pertinent lab results  , ABG:   Lab Results   Component Value Date    PHART 7 405 01/24/2020    NLT6SVZ 63 5 (HH) 01/24/2020    PO2ART 120 6 01/24/2020    QIH9VSX 38 9 (H) 01/24/2020    BEART 11 9 01/24/2020    SOURCE Radial, Right 01/24/2020   , BNP: No results found for: BNP, CBC:   Lab Results   Component Value Date    WBC 8 57 01/25/2020    HGB 11 0 (L) 01/25/2020    HCT 36 6 01/25/2020    MCV 87 01/25/2020     01/25/2020    ADJUSTEDWBC 7 70 08/02/2016    MCH 26 2 (L) 01/25/2020    MCHC 30 1 (L) 01/25/2020    RDW 15 1 01/25/2020    MPV 9 2 01/25/2020    NRBC 0 01/25/2020   , CMP:   Lab Results   Component Value Date     08/28/2015    K 4 4 01/25/2020    K 5 0 08/28/2015     01/25/2020     08/28/2015    CO2 37 (H) 01/25/2020    CO2 39 (H) 02/21/2019    ANIONGAP 5 08/28/2015    BUN 17 01/25/2020    BUN 19 08/28/2015    CREATININE 0 44 (L) 01/25/2020    CREATININE 0 25 (L) 08/28/2015    GLUCOSE 121 02/21/2019    GLUCOSE 114 08/28/2015    CALCIUM 8 6 01/25/2020    CALCIUM 8 8 08/28/2015    AST 14 01/24/2020    AST 11 08/28/2015    ALT 16 01/24/2020    ALT 18 08/28/2015    ALKPHOS 106 01/24/2020    ALKPHOS 84 08/28/2015    PROT 6 1 (L) 08/28/2015    BILITOT 0 27 08/28/2015    EGFR 90 01/25/2020   , PT/INR:   Lab Results   Component Value Date    INR 0 94 01/23/2020    INR 1 06 08/28/2015   , Troponin: No results found for: TROPONIN    Imaging and other studies: I have personally reviewed pertinent reports     and I have personally reviewed pertinent films in PACS

## 2020-01-25 NOTE — PLAN OF CARE
Problem: Potential for Falls  Goal: Patient will remain free of falls  Description  INTERVENTIONS:  - Assess patient frequently for physical needs  -  Identify cognitive and physical deficits and behaviors that affect risk of falls  -  [place] fall precautions as indicated by assessment   - Educate patient/family on patient safety including physical limitations  - Instruct patient to call for assistance with activity based on assessment  - Modify environment to reduce risk of injury  - Consider OT/PT consult to assist with strengthening/mobility  1/25/2020 1151 by Jonatan Mcdonald RN  Outcome: Adequate for Discharge  1/25/2020 1151 by Jonatan Mcdonald RN  Outcome: Progressing     Problem: Prexisting or High Potential for Compromised Skin Integrity  Goal: Skin integrity is maintained or improved  Description  INTERVENTIONS:  - Identify patients at risk for skin breakdown  - Assess and monitor skin integrity  - Assess and monitor nutrition and hydration status  - Monitor labs   - Assess for incontinence   - Turn and reposition patient  - Assist with mobility/ambulation  - Relieve pressure over bony prominences  - Avoid friction and shearing  - Provide appropriate hygiene as needed including keeping skin clean and dry  - Evaluate need for skin moisturizer/barrier cream  - Collaborate with interdisciplinary team   - Patient/family teaching  - Consider wound care consult   1/25/2020 1151 by Jonatan Mcdonald RN  Outcome: Adequate for Discharge  1/25/2020 1151 by Jonatan Mcdonald RN  Outcome: Progressing     Problem: Nutrition/Hydration-ADULT  Goal: Nutrient/Hydration intake appropriate for improving, restoring or maintaining nutritional needs  Description  Monitor and assess patient's nutrition/hydration status for malnutrition  Collaborate with interdisciplinary team and initiate plan and interventions as ordered  Monitor patient's weight and dietary intake as ordered or per policy   Utilize nutrition screening tool and intervene as necessary  Determine patient's food preferences and provide high-protein, high-caloric foods as appropriate       INTERVENTIONS:  - Monitor oral intake, urinary output, labs, and treatment plans  - Assess nutrition and hydration status and recommend course of action  - Evaluate amount of meals eaten  - Assist patient with eating if necessary   - Allow adequate time for meals  - Recommend/ encourage appropriate diets, oral nutritional supplements, and vitamin/mineral supplements  - Order, calculate, and assess calorie counts as needed  - Assess need for intravenous fluids  - Provide specific nutrition/hydration education as appropriate  - Include patient/family/caregiver in decisions related to nutrition   1/25/2020 1151 by Hemanth Astudillo RN  Outcome: Adequate for Discharge  1/25/2020 1151 by Hemanth Astudillo RN  Outcome: Progressing     Problem: RESPIRATORY - ADULT  Goal: Achieves optimal ventilation and oxygenation  Description  INTERVENTIONS:  - Assess for changes in respiratory status  - Assess for changes in mentation and behavior  - Position to facilitate oxygenation and minimize respiratory effort  - Oxygen administered by appropriate delivery if ordered  - Initiate smoking cessation education as indicated  - Encourage broncho-pulmonary hygiene including cough, deep breathe, Incentive Spirometry  - Assess the need for suctioning and aspirate as needed  - Assess and instruct to report SOB or any respiratory difficulty  - Respiratory Therapy support as indicated  1/25/2020 1151 by Hemanth Astudillo RN  Outcome: Adequate for Discharge  1/25/2020 1151 by Hemanth Astudillo RN  Outcome: Progressing

## 2020-01-25 NOTE — NURSING NOTE
Pt left via stretcher accompanied by transport team and pt's daughter  IV dc and intact  Discharge instructions reviewed with pt and her daughter  Prescriptions called to pharmacy  Up to date with immunizations  Non-smoker  No further questions at this time

## 2020-01-25 NOTE — TRANSPORTATION MEDICAL NECESSITY
Section I - General Information    Name of Patient: Deandra Webb                 : 8/10/1931    Medicare #: [id number]  Transport Date: 20 (PCS is valid for round trips on this date and for all repetitive trips in the 60-day range as noted below )  Origin: [address] [address] [place]                                                         Destination:  [facility] [place]   Is the pt's stay covered under Medicare Part A (PPS/DRG)   [x]     Closest appropriate facility? If no, why is transport to more distant facility required? Yes  If hospice pt, is this transport related to pt's terminal illness? No       Section II - Medical Necessity Questionnaire  Ambulance transportation is medically necessary only if other means of transport are contraindicated or would be potentially harmful to the patient  To meet this requirement, the patient must either be "bed confined" or suffer from a condition such that transport by means other than ambulance is contraindicated by the patient's condition  The following questions must be answered by the medical professional signing below for this form to be valid:    1)  Describe the MEDICAL CONDITION (physical and/or mental) of this patient AT [address] [place] [address] that requires the patient to be transported in an ambulance and why transport by other means is contraindicated by the patient's condition: respiratory failure, sacral ulcer, CHF, bedbound     2) Is the patient "bed confined" as defined below? Yes  To be "be confined" the patient must satisfy all three of the following conditions: (1) unable to get up from bed without Assistance; AND (2) unable to ambulate; AND (3) unable to sit in a chair or wheelchair  3) Can this patient safely be transported by car or wheelchair van (i e , seated during transport without a medical attendant or monitoring)?    No    4) In addition to completing questions 1-3 above, please check any of the following conditions that apply*:   *Note: supporting documentation for any boxes checked must be maintained in the patient's medical records  If hosp-hosp transfer, describe services needed at 2nd facility not available at 1st facility? Moderate/severe pain on movement   Medical attendant required   Unable to tolerate seated position for time needed to transport   Unable to sit in a chair or wheelchair due to decubitus ulcers or other wounds       Section III - Signature of Physician or Healthcare Professional  I certify that the above information is true and correct based on my evaluation of this patient, and represent that the patient requires transport by ambulance and that other forms of transport are contraindicated  I understand that this information will be used by the Centers for Medicare and Medicaid Services (CMS) to support the determination of medical necessity for ambulance services, and I represent that I have personal knowledge of the patient's condition at time of transport  []  If this box is checked, I also certify that the patient is physically or mentally incapable of signing the ambulance service's claim and that the institution with which I am affiliated has furnished care, services, or assistance to the patient  My signature below is made on behalf of the patient pursuant to 42 CFR §424 36(b)(4)   In accordance with 42 CFR §424 37, the specific reason(s) that the patient is physically or mentally incapable of signing the claim form is as follows:     Signature of Physician* or Healthcare Professional_Rema Last_____________________________________________________________  Signature Date 01/25/20 (For scheduled repetitive transports, this form is not valid for transports performed more than 60 days after this date)    Printed Name & Credentials of Physician or Healthcare Professional (MD, DO, RN, etc )________________________________  *Form must be signed by patient's attending physician for scheduled, repetitive transports   For non-repetitive, unscheduled ambulance transports, if unable to obtain the signature of the attending physician, any of the following may sign (choose appropriate option below)  [] Physician Assistant []  Clinical Nurse Specialist []  Registered Nurse  []  Nurse Practitioner  [x] Discharge Planner

## 2020-01-25 NOTE — PLAN OF CARE
Problem: Potential for Falls  Goal: Patient will remain free of falls  Description  INTERVENTIONS:  - Assess patient frequently for physical needs  -  Identify cognitive and physical deficits and behaviors that affect risk of falls  -  [place]set fall precautions as indicated by assessment   - Educate patient/family on patient safety including physical limitations  - Instruct patient to call for assistance with activity based on assessment  - Modify environment to reduce risk of injury  - Consider OT/PT consult to assist with strengthening/mobility  Outcome: Progressing     Problem: Prexisting or High Potential for Compromised Skin Integrity  Goal: Skin integrity is maintained or improved  Description  INTERVENTIONS:  - Identify patients at risk for skin breakdown  - Assess and monitor skin integrity  - Assess and monitor nutrition and hydration status  - Monitor labs   - Assess for incontinence   - Turn and reposition patient  - Assist with mobility/ambulation  - Relieve pressure over bony prominences  - Avoid friction and shearing  - Provide appropriate hygiene as needed including keeping skin clean and dry  - Evaluate need for skin moisturizer/barrier cream  - Collaborate with interdisciplinary team   - Patient/family teaching  - Consider wound care consult   Outcome: Progressing     Problem: Nutrition/Hydration-ADULT  Goal: Nutrient/Hydration intake appropriate for improving, restoring or maintaining nutritional needs  Description  Monitor and assess patient's nutrition/hydration status for malnutrition  Collaborate with interdisciplinary team and initiate plan and interventions as ordered  Monitor patient's weight and dietary intake as ordered or per policy  Utilize nutrition screening tool and intervene as necessary  Determine patient's food preferences and provide high-protein, high-caloric foods as appropriate       INTERVENTIONS:  - Monitor oral intake, urinary output, labs, and treatment plans  - Assess nutrition and hydration status and recommend course of action  - Evaluate amount of meals eaten  - Assist patient with eating if necessary   - Allow adequate time for meals  - Recommend/ encourage appropriate diets, oral nutritional supplements, and vitamin/mineral supplements  - Order, calculate, and assess calorie counts as needed  - Assess need for intravenous fluids  - Provide specific nutrition/hydration education as appropriate  - Include patient/family/caregiver in decisions related to nutrition   Outcome: Progressing     Problem: RESPIRATORY - ADULT  Goal: Achieves optimal ventilation and oxygenation  Description  INTERVENTIONS:  - Assess for changes in respiratory status  - Assess for changes in mentation and behavior  - Position to facilitate oxygenation and minimize respiratory effort  - Oxygen administered by appropriate delivery if ordered  - Initiate smoking cessation education as indicated  - Encourage broncho-pulmonary hygiene including cough, deep breathe, Incentive Spirometry  - Assess the need for suctioning and aspirate as needed  - Assess and instruct to report SOB or any respiratory difficulty  - Respiratory Therapy support as indicated  Outcome: Progressing

## 2020-01-25 NOTE — PROGRESS NOTES
[facility] Progress Note - Akanksha Noe 80 y o  female MRN: [id number]    Unit/Bed#: [address] [place] [address] Encounter: [id number]      Assessment/Plan:  UTI (urinary tract infection)  Assessment & Plan  Patient presents with altered mental status x1 day which could be secondary to UTI  Urine micro:  4-10 WBC, moderate bacteria  WBC 11 24  · Urine culture pending  · Continue Rocephin 1 g IV v- consider transition to PO Bactrim on discharge    Diastolic congestive heart failure (HCC)  Assessment & Plan  Wt Readings from Last 3 Encounters:   02/28/19 103 kg (227 lb 1 2 oz)   04/26/18 125 kg (275 lb)   07/29/16 89 kg (196 lb 3 4 oz)   Echo 02/2019:  LV EF 55-60%; mildly dilated left and right atria; BNP on admission; CXR  · Continue home medication Lopressor 25 mg p o  B i d , diltiazem 240 mg p o  Q d  · Lasix 20 mg p o  · Telemetry  · Fluid restriction  · I&O  · Daily weights    PORTIA treated with BiPAP  Assessment & Plan  · BiPAP q h s  12/6    Sacral ulcer ([address] )  Assessment & Plan  · 2 grade 2 ulcers over lower buttocks  · Consult placed to wound care    Muscular dystrophy, limb girdle  Assessment & Plan  Patient is bedbound  Patient deconditioned, discussed with patient possible utility of physical therapy for upper limbs  · Will order PT/OT    Essential hypertension  Assessment & Plan  BP WNL  · Continue with home medication Lopressor 25 mg p o  B i d , continue home medication diltiazem 240 mg p o  Q d  · Will monitor VS qshift    Generalized osteoarthritis  Assessment & Plan  · Will continue with celebrex 200 mg  · Vitamin D level low at 8  · Started patient on 5000 U Vit D3 - will continue this regimen on discharge for a total of 8 weeks, then transition to maintenance 1000 U daily    * Acute on chronic respiratory failure with hypoxia and hypercapnia [facility])  Assessment & Plan  Patient came to ED as she was having hallucinations, typically occurring when she is very hypercapnic    ABG admission: 7 394/62 5/109 5/37 4 with repeat this morning 7 406/63 2/120/38 9  ABG in the past have show this patient is typically hypercapnic most likely 2/2 to her baseline MDS    CXR: tiny right pleural effusion  · BNP   · Flu/RSV neg    · Lasix 20 mg po daily starting tomorrow ; sp lasix IV 40 mg on admission  · BiPAP at night  · Vitals q4h and will monitor clinically  · Continue with Breo Ellipta 100-25 q d  · DuoNebs q 6 scheduled  · Maintain oxygen saturation above 92% - currently on homoe 2 L n and BIPAP at night  · Consult pulmonology- Patient of Dr Nathalia Deras  · Current respiratory status most likely 2/2 decompensation of unknown etiology from her baseline chronic resp failure due to MDS  · PNA unlikely at this time      Subjective:   Patient was seen and examined at bedside  No acute events overnight  Patient has no complaints today  She is aware of plan for discharge today  Objective:     Vitals: Blood pressure 120/63, pulse (!) 108, temperature 98 2 °F (36 8 °C), temperature source Oral, resp  rate 17, height 5' 6" (1 676 m), weight 106 kg (232 lb 11 2 oz), SpO2 98 %, not currently breastfeeding  ,Body mass index is 37 56 kg/m²    Wt Readings from Last 3 Encounters:   01/25/20 106 kg (232 lb 11 2 oz)   02/28/19 103 kg (227 lb 1 2 oz)   04/26/18 125 kg (275 lb)       Intake/Output Summary (Last 24 hours) at 1/25/2020 1129  Last data filed at 1/24/2020 2054  Gross per 24 hour   Intake 50 ml   Output    Net 50 ml       Physical Exam: General appearance: alert and oriented, in no acute distress  Lungs: clear to auscultation bilaterally  Heart: regular rate and rhythm, S1, S2 normal, no murmur, click, rub or gallop  Abdomen: soft, non-tender; bowel sounds normal; no masses,  no organomegaly  Extremities: no edema, redness or tenderness in the calves or thighs     Recent Results (from the past 24 hour(s))   CBC and differential    Collection Time: 01/25/20  6:10 AM   Result Value Ref Range    WBC 8 57 4 31 - 10 16 Thousand/uL    RBC 4 20 3 81 - 5 12 Million/uL    Hemoglobin 11 0 (L) 11 5 - 15 4 g/dL    Hematocrit 36 6 34 8 - 46 1 %    MCV 87 82 - 98 fL    MCH 26 2 (L) 26 8 - 34 3 pg    MCHC 30 1 (L) 31 4 - 37 4 g/dL    RDW 15 1 11 6 - 15 1 %    MPV 9 2 8 9 - 12 7 fL    Platelets 480 526 - 801 Thousands/uL    nRBC 0 /100 WBCs    Neutrophils Relative 70 43 - 75 %    Immat GRANS % 0 0 - 2 %    Lymphocytes Relative 23 14 - 44 %    Monocytes Relative 7 4 - 12 %    Eosinophils Relative 0 0 - 6 %    Basophils Relative 0 0 - 1 %    Neutrophils Absolute 5 95 1 85 - 7 62 Thousands/µL    Immature Grans Absolute 0 02 0 00 - 0 20 Thousand/uL    Lymphocytes Absolute 1 99 0 60 - 4 47 Thousands/µL    Monocytes Absolute 0 59 0 17 - 1 22 Thousand/µL    Eosinophils Absolute 0 00 0 00 - 0 61 Thousand/µL    Basophils Absolute 0 02 0 00 - 0 10 Thousands/µL   Basic metabolic panel    Collection Time: 01/25/20  6:10 AM   Result Value Ref Range    Sodium 139 136 - 145 mmol/L    Potassium 4 4 3 5 - 5 3 mmol/L    Chloride 100 100 - 108 mmol/L    CO2 37 (H) 21 - 32 mmol/L    ANION GAP 2 (L) 4 - 13 mmol/L    BUN 17 5 - 25 mg/dL    Creatinine 0 44 (L) 0 60 - 1 30 mg/dL    Glucose 117 65 - 140 mg/dL    Glucose, Fasting 117 (H) 65 - 99 mg/dL    Calcium 8 6 8 3 - 10 1 mg/dL    eGFR 90 ml/min/1 73sq m   Magnesium    Collection Time: 01/25/20  6:10 AM   Result Value Ref Range    Magnesium 2 4 1 6 - 2 6 mg/dL   Phosphorus    Collection Time: 01/25/20  6:10 AM   Result Value Ref Range    Phosphorus 3 3 2 3 - 4 1 mg/dL       Current Facility-Administered Medications   Medication Dose Route Frequency Provider Last Rate Last Dose    acetaminophen (TYLENOL) tablet 650 mg  650 mg Oral Q6H PRN Jose Monetz Giraldo, DO   650 mg at 01/24/20 0321    cefTRIAXone (ROCEPHIN) IVPB (premix) 1,000 mg  1,000 mg Intravenous Q24H Slime Chavez MD   Stopped at 01/24/20 2054    celecoxib (CeleBREX) capsule 200 mg  200 mg Oral Daily Slime Chavez MD   200 mg at 01/25/20 0830    cholecalciferol (VITAMIN D3) tablet 1,000 Units  1,000 Units Oral Daily Shahzad Jaime MD   1,000 Units at 01/25/20 0830    diltiazem (CARDIZEM CD) 24 hr capsule 240 mg  240 mg Oral Daily Margaret Siegel MD   240 mg at 01/25/20 8468    enoxaparin (LOVENOX) subcutaneous injection 40 mg  40 mg Subcutaneous Daily Margaret Siegel MD   40 mg at 01/25/20 0830    fluticasone-vilanterol (BREO ELLIPTA) 100-25 mcg/inh inhaler 1 puff  1 puff Inhalation Daily Margaret Siegel MD   1 puff at 01/25/20 0830    furosemide (LASIX) tablet 20 mg  20 mg Oral Daily Shahzad Jaime MD   20 mg at 01/25/20 0830    ipratropium-albuterol (DUO-NEB) 0 5-2 5 mg/3 mL inhalation solution 3 mL  3 mL Nebulization Q6H Margaret Siegel MD   3 mL at 01/25/20 0731    metoprolol tartrate (LOPRESSOR) tablet 25 mg  25 mg Oral Q12H [address] Margaret Siegel MD   25 mg at 01/25/20 0830    nystatin (MYCOSTATIN) powder 1 application  1 application Topical BID Shahzad Jaime MD   1 application at 41/73/46 2025       Invasive Devices     Peripheral Intravenous Line            Peripheral IV 01/23/20 Right Antecubital 2 days          Drain            External Urinary Catheter 1 day                Lab, Imaging and other studies: I have personally reviewed pertinent reports      VTE Pharmacologic Prophylaxis: Enoxaparin (Lovenox)  VTE Mechanical Prophylaxis: sequential compression device    Raul Ogden DO

## 2020-01-26 NOTE — SOCIAL WORK
Pt discharged today to return home  CM was informed by nursing staff that pt is bed bound and family cannot transport pt home  [facility] called to arrange /stretcher and pt to be picked up approx 3:30p   Pt's dtr present and aware of  time

## 2020-01-27 ENCOUNTER — TRANSITIONAL CARE MANAGEMENT (OUTPATIENT)
Dept: FAMILY MEDICINE CLINIC | Facility: CLINIC | Age: 85
End: 2020-01-27

## 2020-01-28 ENCOUNTER — EPISODE CHANGES (OUTPATIENT)
Dept: CASE MANAGEMENT | Facility: OTHER | Age: 85
End: 2020-01-28

## 2020-01-30 NOTE — PHYSICIAN ADVISOR
Current patient class: Inpatient  The patient is currently on Hospital Day: 3 at [address] [place] [address]      The patient was admitted to the hospital at [place] [address] [facility] on 1/25/20 for the following diagnosis:  Chronic respiratory failure ([address] ) [J96 10]  Shortness of breath [R06 02]  Hypercarbia [R06 89]  UTI (urinary tract infection) [N39 0]  Altered mental status [R41 82]  Difficulty breathing [R06 89]       There is documentation in the medical record of an expected length of stay of at least 2 midnights  The patient is therefore expected to satisfy the 2 midnight benchmark and given the 2 midnight presumption is appropriate for INPATIENT ADMISSION  Given this expectation of a satisfying stay, CMS instructs us that the patient is most often appropriate for inpatient admission under part A provided medical necessity is documented in the chart  After review of the relevant documentation, labs, vital signs and test results, the patient is appropriate for INPATIENT ADMISSION  Admission to the hospital as an inpatient is a complex decision making process which requires the practitioner to consider the patients presenting complaint, history and physical examination and all relevant testing  With this in mind, in this case, the patient was deemed appropriate for INPATIENT ADMISSION  After review of the documentation and testing available at the time of the admission I concur with this clinical determination of medical necessity  Rationale is as follows: The patient is a 80 yrs old Female who presented to the ED at 1/23/2020  5:58 PM with a chief complaint of Shortness of Breath (pt daughter called 911 for SOB and auditory/visual hallucinations which dtr reports normally happens when oxygen levels drop)  The patient does have chronic hypoxemic respiratory failure as well    The patient was found to be hypercapnic and does have a history of chronic hypercapnic hypoxemic respiratory failure with underlying history of muscular dystrophy  The patient change in mental status is admission as well for which they felt was secondary to urinary tract infection the patient was placed on IV antibiotics which was continued throughout the hospitalization  Given need for pulmonology evaluation in a patient came with shortness of breath with underlying pulmonary disease as well as being encephalopathic secondary to urinary tract infection need of IV antibiotics the patient was receiving acute inpatient medical care and did cross the 2 midnight benchmark as set by Medicare and is inpatient status appropriate based on medical necessity  The patients vitals on arrival were ED Triage Vitals   Temperature Pulse Respirations Blood Pressure SpO2   01/23/20 1804 01/23/20 1804 01/23/20 1804 01/23/20 1804 01/23/20 1804   98 5 °F (36 9 °C) (!) 110 18 (!) 176/83 99 %      Temp Source Heart Rate Source Patient Position - Orthostatic VS BP Location FiO2 (%)   01/23/20 1804 01/23/20 1804 01/23/20 1804 01/23/20 1804 01/24/20 0507   Tympanic Monitor Lying Left arm 30      Pain Score       01/23/20 1800       No Pain           Past Medical History:   Diagnosis Date    Acute bronchitis     Arthritis     Asthmatic bronchitis     Resolved 10/4/2017     CHF (congestive heart failure) [facility] )     Community acquired pneumonia     Resolved 10/4/2017     Foot fracture, left     Last assessed 5/27/2014     History of being hospitalized 08/2015    Nondisplaced right femur fracture; [facility] LEXI Gilman    History of methicillin resistant staphylococcus aureus (MRSA)     7/29/2016 MRSA (nares) positive  Negative 2/21/2018   Isolation discontinued 2/25/2019    Hypertension     Morbid obesity ([address] )     Muscular dystrophy ([facility] )     Thyroglossal duct cyst     Last assessed 5/27/2014      Past Surgical History:   Procedure Laterality Date    APPENDECTOMY      CHOLECYSTECTOMY      HYSTERECTOMY      IR PICC LINE  2/22/2019    THYROID CYST EXCISION      THYROID SURGERY      TOTAL ABDOMINAL HYSTERECTOMY      Last assessed 5/27/2014    VEIN SURGERY Bilateral            Consults have been placed to:   IP CONSULT TO PULMONOLOGY  IP CONSULT TO WOUND CARE    Vitals:    01/25/20 0600 01/25/20 0730 01/25/20 0826 01/25/20 1311   BP:   120/63    BP Location:   Left arm    Pulse:  87 (!) 108    Resp:  18 17    Temp:   98 2 °F (36 8 °C)    TempSrc:   Oral    SpO2:  98% 98% 96%   Weight: 106 kg (232 lb 11 2 oz)      Height:           Most recent labs:    No results for input(s): WBC, HGB, HCT, PLT, K, NA, CALCIUM, BUN, CREATININE, LIPASE, AMYLASE, INR, TROPONINI, CKTOTAL, AST, ALT, ALKPHOS, BILITOT in the last 72 hours  Scheduled Meds:  Continuous Infusions:  No current facility-administered medications for this encounter  PRN Meds:      Surgical procedures (if appropriate):

## 2020-01-30 NOTE — DISCHARGE SUMMARY
[facility] Discharge Summary [facility] [address] [facility] 80 [facility]  [facility] MRN: [id number]  [address] [facility] [facility] Encounter: [id number]     BRIEF OVERVIEW  Admitting Provider: Jonatan Roth MD  Discharge Provider: Tracie Leyva MD     Discharge To: Home     Outpatient Follow-Up:  [facility], Dr Griffin Lopez on 1/29/2020  Things to address at first follow up visit:   1  Any more episodes of altered mental status  2  Proper genital urinary hygiene for prevention of future UTI  3  O2 - sat, on 2 L O2 nasal cannula at home  4  If she breathing okay, any dyspnea  5  Is she taking her vitamin-D      Labs and results pending at discharge:  1/24: Vit D- 8; WBC 10 3, HB 10,    1/23: ABG: ph 7 393, Co2 62 8, PO2 110, HCO3 37 4  UA: positive nitrites&Leuks  WBC 11 24 Flu  neg   CXR: no acute cardiopulm disease     Admission Date: 1/23/2020     Discharge Date: 1/25/2020     Primary Discharge Diagnosis  Principal Problem:    Acute on chronic respiratory failure with hypoxia and hypercapnia (HCC)  Active Problems:    Generalized osteoarthritis    Essential hypertension    Muscular dystrophy, limb girdle    Sacral ulcer (HCC)    PORTIA treated with BiPAP    Diastolic congestive heart failure ([address] )    UTI (urinary tract infection)  Resolved Problems:    Chronic respiratory failure ([address] )     Secondary Discharge Diagnoses  None  Consulting Providers   Pulmonary     DETAILS OF HOSPITAL STAY     Procedures Performed/Pertinent Test results  None      HPI  Roxanne craig 80 y  o  female past medical history chronic respiratory failure, PORTIA , diastolic congestive heart failure, hypertension , osteoarthritis , and muscular dystrophy who presents with altered mental status  Patient reports that she was having visual and auditory hallucination earlier this afternoon which usually happens when she is hypercarbic   Patient denies any fever, chills, headache or dizziness, chest pain, shortness of breath, loss of consciousness, abdominal pain, nausea/vomiting/diarrhea, dysuria hematuria   Per family member at bedside, altered mental status resolved upon presentation to the emergency room  Luke Sullivan is a 80 y  o  female past medical history chronic respiratory failure , PORTIA , diastolic congestive heart failure, hypertension , osteoarthritis , and muscular dystrophy who presents with altered mental status  Patient reports that she was having visual and auditory hallucination earlier this afternoon which usually happens when she is hypercarbic  Patient denies any fever, chills, headache or dizziness, chest pain, shortness of breath, loss of consciousness, abdominal pain, nausea/vomiting/diarrhea, dysuria hematuria   Per family member at bedside, altered mental status resolved upon presentation to the emergency room      Hospital Course  Patient was admitted to the hospital for altered mental status in the setting of chronic respiratory failure and UTI  Pulmonology was consulted given hypercarbia but it was determined to be chronic and at baseline  Patient was started on Rocephin 1 g daily for treatment of UTI  Urine culture grew greater than 100,000 mixed contaminant  Patient was also found to be vitamin-D deficient and was started on vitamin-D supplementation while inpatient    Patient was discharged in stable condition and advised to continue all home meds in addition to completing 5 day course of Ceftin and starting daily vitamin-D supplementation       Physical Exam at Discharge  See physical exam from day of discharge     Medications     acetaminophen 325 mg tablet   Commonly known as: TYLENOL   Take one or 2 every 6 hours as needed for pain   Refills: 0                cefuroxime 250 mg tablet   Commonly known as: CEFTIN   Take 1 tablet (250 mg total) by mouth every 12 (twelve) hours for 5 days   Refills: 0                celecoxib 200 mg capsule   Commonly known as: CeleBREX   Take 1 capsule (200 mg total) by mouth daily   Refills: 3                diltiazem 240 mg 24 hr capsule   Commonly known as: CARDIZEM CD   Take 1 capsule (240 mg total) by mouth daily   Refills: 3                fluocinonide 0 05 % cream   Commonly known as: LIDEX   Apply topically 2 (two) times a day   Refills: 5                fluticasone-vilanterol 100-25 mcg/inh inhaler   Commonly known as: BREO ELLIPTA   Inhale 1 puff daily Rinse mouth after use  Refills: 0                furosemide 20 mg tablet   Commonly known as: LASIX   Take 1 tablet (20 mg total) by mouth daily Pt takes half a pill 3 x a week   Refills: 3   What changed: additional instructions                ipratropium-albuterol 0 5-2 5 mg/3 mL nebulizer solution   Commonly known as: DUO-NEB   Take 1 vial (3 mL total) by nebulization every 6 (six) hours   Refills: 6                metoprolol tartrate 25 mg tablet   Commonly known as: LOPRESSOR   Take 1 tablet (25 mg total) by mouth every 12 (twelve) hours   Refills: 3                nystatin powder   Commonly known as: MYCOSTATIN   Apply topically 4 (four) times a day   Refills: 3                silver sulfadiazine 1 % cream   Commonly known as: SILVADENE,SSD   Apply topically daily   Refills: 3                triamcinolone 0 1 % cream   Commonly known as: KENALOG   Apply topically 2 (two) times a day   Refills: 5                Vitamin D3 125 MCG (5000 UT) Tabs   Take 1 tablet (5,000 Units total) by mouth daily   Refills: 1                 Allergies  No Known Allergies           Diet restrictions: As tolerated  Activity restrictions: As tolerated  Code Status: Level 3 - DNAR and DNI  Advance Directive and Living Will: <no information>  Power of :    POLST:       Discharge Condition: stable     Discharge  Statement   I spent 30 minutes discharging the patient  This time was spent on the day of discharge  I had direct contact with the patient on the day of discharge   Additional documentation is required if more than 30 minutes were spent on discharge

## 2020-02-13 ENCOUNTER — TELEPHONE (OUTPATIENT)
Dept: FAMILY MEDICINE CLINIC | Facility: CLINIC | Age: 85
End: 2020-02-13

## 2020-02-13 DIAGNOSIS — B37.2 CANDIDA INFECTION OF FLEXURAL SKIN: ICD-10-CM

## 2020-02-13 DIAGNOSIS — L30.8 OTHER ECZEMA: Primary | ICD-10-CM

## 2020-02-13 RX ORDER — FLUOCINONIDE TOPICAL SOLUTION USP, 0.05% 0.5 MG/ML
SOLUTION TOPICAL 2 TIMES DAILY
Qty: 60 ML | Refills: 2 | Status: SHIPPED | OUTPATIENT
Start: 2020-02-13 | End: 2021-10-28 | Stop reason: HOSPADM

## 2020-02-13 RX ORDER — NYSTATIN 100000 U/G
CREAM TOPICAL 2 TIMES DAILY
Qty: 30 G | Refills: 2 | Status: SHIPPED | OUTPATIENT
Start: 2020-02-13 | End: 2020-09-17 | Stop reason: SDUPTHER

## 2020-02-13 NOTE — TELEPHONE ENCOUNTER
Pharmacy called to tell us that the patient wants Lidex solution instead of cream and Nystatin Cream instead of powder  They would like new prescriptions sent today

## 2020-02-18 ENCOUNTER — EPISODE CHANGES (OUTPATIENT)
Dept: FAMILY MEDICINE CLINIC | Facility: CLINIC | Age: 85
End: 2020-02-18

## 2020-02-26 DIAGNOSIS — R60.0 LOWER EXTREMITY EDEMA: ICD-10-CM

## 2020-02-26 RX ORDER — FUROSEMIDE 20 MG/1
20 TABLET ORAL DAILY
Qty: 90 TABLET | Refills: 1 | Status: SHIPPED | OUTPATIENT
Start: 2020-02-26 | End: 2020-07-25

## 2020-04-21 DIAGNOSIS — R00.0 TACHYCARDIA: ICD-10-CM

## 2020-04-22 DIAGNOSIS — R00.0 TACHYCARDIA: ICD-10-CM

## 2020-04-22 RX ORDER — DILTIAZEM HYDROCHLORIDE 240 MG/1
240 CAPSULE, COATED, EXTENDED RELEASE ORAL DAILY
Qty: 90 CAPSULE | Refills: 3 | Status: SHIPPED | OUTPATIENT
Start: 2020-04-22 | End: 2021-01-01 | Stop reason: SDUPTHER

## 2020-04-22 RX ORDER — DILTIAZEM HYDROCHLORIDE 240 MG/1
CAPSULE, COATED, EXTENDED RELEASE ORAL
Qty: 90 CAPSULE | Refills: 3 | OUTPATIENT
Start: 2020-04-22

## 2020-06-04 DIAGNOSIS — I10 ESSENTIAL HYPERTENSION: Chronic | ICD-10-CM

## 2020-06-24 ENCOUNTER — PATIENT OUTREACH (OUTPATIENT)
Dept: FAMILY MEDICINE CLINIC | Facility: CLINIC | Age: 85
End: 2020-06-24

## 2020-07-25 DIAGNOSIS — R60.0 LOWER EXTREMITY EDEMA: ICD-10-CM

## 2020-07-25 RX ORDER — FUROSEMIDE 20 MG/1
TABLET ORAL
Qty: 90 TABLET | Refills: 3 | Status: SHIPPED | OUTPATIENT
Start: 2020-07-25 | End: 2021-01-01 | Stop reason: SDUPTHER

## 2020-08-26 ENCOUNTER — PATIENT OUTREACH (OUTPATIENT)
Dept: FAMILY MEDICINE CLINIC | Facility: CLINIC | Age: 85
End: 2020-08-26

## 2020-08-28 ENCOUNTER — PATIENT OUTREACH (OUTPATIENT)
Dept: FAMILY MEDICINE CLINIC | Facility: CLINIC | Age: 85
End: 2020-08-28

## 2020-08-28 NOTE — PROGRESS NOTES
Received phone call from patients daughter Sachi Karimi Asp is requesting follow up information regarding status of wheelchair order  Explained that Dr Wayne Marcano needs to evaluate patient first  CM will assist in scheduling a home visit with Dr Wayne Marcano  Criteria reviewed with daughter  Pt requires the following on her wheelchair:  Bariatric size, reclining capability, removal arm and leg rests

## 2020-08-28 NOTE — PROGRESS NOTES
Outreach to [employer] regarding need to order wheelchair that was requested by family  Criteria for ordering a wheelchair was reviewed  Pt will need to be seen by provider for recent evaluation

## 2020-09-01 ENCOUNTER — PATIENT OUTREACH (OUTPATIENT)
Dept: FAMILY MEDICINE CLINIC | Facility: CLINIC | Age: 85
End: 2020-09-01

## 2020-09-01 NOTE — PROGRESS NOTES
LVM for daughter Violet Krishna ( on work extension)  advising that Dr Jalil Sandoval will do a home visit tomorrow after 10 am  LVM on daughters mobile phone as well

## 2020-09-02 ENCOUNTER — IN HOME VISIT (OUTPATIENT)
Dept: FAMILY MEDICINE CLINIC | Facility: CLINIC | Age: 85
End: 2020-09-02
Payer: MEDICARE

## 2020-09-02 DIAGNOSIS — G71.09 MUSCULAR DYSTROPHY, LIMB GIRDLE (HCC): Chronic | ICD-10-CM

## 2020-09-02 DIAGNOSIS — E66.01 MORBID OBESITY (HCC): Primary | Chronic | ICD-10-CM

## 2020-09-02 DIAGNOSIS — I10 ESSENTIAL HYPERTENSION: Chronic | ICD-10-CM

## 2020-09-02 DIAGNOSIS — Z99.3 WHEELCHAIR DEPENDENT: ICD-10-CM

## 2020-09-02 PROBLEM — N39.0 UTI (URINARY TRACT INFECTION): Status: RESOLVED | Noted: 2020-01-23 | Resolved: 2020-09-02

## 2020-09-02 PROCEDURE — 99348 HOME/RES VST EST LOW MDM 30: CPT | Performed by: FAMILY MEDICINE

## 2020-09-02 NOTE — ASSESSMENT & PLAN NOTE
· Currently uses [employer] back 10 since 2005  · The current wheelchair is malfunctioning, so she requires a new one  · Her chronic muscular dystrophy impairs her ability to participate in mobility related activities

## 2020-09-02 NOTE — PROGRESS NOTES
Assessment/Plan:     Problem List Items Addressed This Visit        Cardiovascular and Mediastinum    Essential hypertension (Chronic)     · C/w Metoprolol 25 mg q12h and Diltiazem 240 mg q24h            Musculoskeletal and Integument    Muscular dystrophy, limb girdle (Chronic)       Other    Morbid obesity (HCC) - Primary (Chronic)    Wheelchair dependent     · Currently uses [employer] back 10 since 2005  · The current wheelchair is malfunctioning, so she requires a new one  · Her chronic muscular dystrophy impairs her ability to participate in mobility related activities                  Subjective:     Patient ID: Fred Rice is a 80 y o  female  HPI  80year old female with multiple chronic conditions seen and examined at  home visit with daughter at bedside  Last home visit was on  on February 2020  Patient reports feeling well overall  She would like to have a new motorized wheelchair  Patient states that she must spend about 6-8 hours a day sited/reclined instead of lying all day in bed 2/2 orthopnea  Patient needs to use an Kevin lift in order to get out of bed to her wheelchair  Past Medical History:   Diagnosis Date    Acute bronchitis     Arthritis     Asthmatic bronchitis     Resolved 10/4/2017     [facility]est[facility] [address] [address] [facility]     Community acquired pneumonia     Resolved 10/4/2017     Foot fracture, left     Last assessed 5/27/2014     History of being hospitalized 08/2015    Nondisplaced right femur fracture; MS LEXI Gilman    History of methicillin resistant staphylococcus aureus (MRSA)     7/29/2016 MRSA (nares) positive  Negative 2/21/2018  Isolation discontinued 2/25/2019    Hypertension     Morbid [facility]     Muscular dystrophy [facility] [facility]     Thyroglossal duct cyst     Last assessed 5/27/2014        Review of Systems   Respiratory:        + orthopnea   Cardiovascular: Negative for chest pain     Musculoskeletal:        Bedridden   All other systems reviewed and are negative  Objective:  VS: /68, HR 77, O2 98% on 2 L via NC   Physical Exam  Constitutional:       General: She is not in acute distress  HENT:      Head: Normocephalic and atraumatic  Neck:      Musculoskeletal: Normal range of motion  Cardiovascular:      Rate and Rhythm: Normal rate and regular rhythm  Heart sounds: No murmur  Pulmonary:      Effort: Pulmonary effort is normal  No respiratory distress  Abdominal:      General: There is no distension  Palpations: Abdomen is soft  Tenderness: There is no abdominal tenderness  There is no guarding  Comments: obese   Musculoskeletal:      Comments: Bedridden, needs a wheelchair   Skin:     General: Skin is warm and dry  Neurological:      General: No focal deficit present  Mental Status: She is alert  Mental status is at baseline     Psychiatric:         Mood and Affect: Mood normal

## 2020-09-15 ENCOUNTER — PATIENT OUTREACH (OUTPATIENT)
Dept: FAMILY MEDICINE CLINIC | Facility: CLINIC | Age: 85
End: 2020-09-15

## 2020-09-15 DIAGNOSIS — G71.09 MUSCULAR DYSTROPHY, LIMB GIRDLE (HCC): Primary | Chronic | ICD-10-CM

## 2020-09-15 DIAGNOSIS — J96.22 ACUTE ON CHRONIC RESPIRATORY FAILURE WITH HYPOXIA AND HYPERCAPNIA (HCC): ICD-10-CM

## 2020-09-15 DIAGNOSIS — M15.9 GENERALIZED OSTEOARTHRITIS: Chronic | ICD-10-CM

## 2020-09-15 DIAGNOSIS — G89.29 OTHER CHRONIC PAIN: Chronic | ICD-10-CM

## 2020-09-15 DIAGNOSIS — Z99.3 WHEELCHAIR DEPENDENT: ICD-10-CM

## 2020-09-15 DIAGNOSIS — E66.01 MORBID OBESITY (HCC): Chronic | ICD-10-CM

## 2020-09-15 DIAGNOSIS — J96.21 ACUTE ON CHRONIC RESPIRATORY FAILURE WITH HYPOXIA AND HYPERCAPNIA (HCC): ICD-10-CM

## 2020-09-15 NOTE — PROGRESS NOTES
Order for wheelchair, demographics and home visit notes faxed to [facility]  VM received from daughter  Requests refill on celebrex  In basket message sent to Dr Sina Whitehead

## 2020-09-16 DIAGNOSIS — M19.90 OSTEOARTHRITIS, UNSPECIFIED OSTEOARTHRITIS TYPE, UNSPECIFIED SITE: ICD-10-CM

## 2020-09-16 RX ORDER — CELECOXIB 200 MG/1
200 CAPSULE ORAL DAILY
Qty: 90 CAPSULE | Refills: 3 | Status: SHIPPED | OUTPATIENT
Start: 2020-09-16 | End: 2021-10-28 | Stop reason: HOSPADM

## 2020-09-17 DIAGNOSIS — B37.2 CANDIDA INFECTION OF FLEXURAL SKIN: ICD-10-CM

## 2020-09-17 RX ORDER — NYSTATIN 100000 U/G
CREAM TOPICAL 2 TIMES DAILY
Qty: 30 G | Refills: 5 | Status: SHIPPED | OUTPATIENT
Start: 2020-09-17 | End: 2021-10-28 | Stop reason: HOSPADM

## 2020-09-24 ENCOUNTER — PATIENT OUTREACH (OUTPATIENT)
Dept: FAMILY MEDICINE CLINIC | Facility: CLINIC | Age: 85
End: 2020-09-24

## 2020-09-24 ENCOUNTER — TELEPHONE (OUTPATIENT)
Dept: FAMILY MEDICINE CLINIC | Facility: CLINIC | Age: 85
End: 2020-09-24

## 2020-09-24 NOTE — PROGRESS NOTES
Returned daughters phone call regarding flu vaccine for patient  LVM advising that Dr Aide Kenyon was away and that once he returns arrangements will be made for patient to receive flu vaccine

## 2020-10-07 ENCOUNTER — IN HOME VISIT (OUTPATIENT)
Dept: FAMILY MEDICINE CLINIC | Facility: CLINIC | Age: 85
End: 2020-10-07
Payer: MEDICARE

## 2020-10-07 DIAGNOSIS — Z23 ENCOUNTER FOR IMMUNIZATION: Primary | ICD-10-CM

## 2020-10-07 PROCEDURE — 90662 IIV NO PRSV INCREASED AG IM: CPT

## 2020-10-07 PROCEDURE — G0008 ADMIN INFLUENZA VIRUS VAC: HCPCS

## 2020-10-07 PROCEDURE — 99348 HOME/RES VST EST LOW MDM 30: CPT | Performed by: FAMILY MEDICINE

## 2021-01-01 ENCOUNTER — PATIENT OUTREACH (OUTPATIENT)
Dept: FAMILY MEDICINE CLINIC | Facility: CLINIC | Age: 86
End: 2021-01-01

## 2021-01-01 ENCOUNTER — APPOINTMENT (INPATIENT)
Dept: RADIOLOGY | Facility: HOSPITAL | Age: 86
DRG: 871 | End: 2021-01-01
Payer: MEDICARE

## 2021-01-01 ENCOUNTER — APPOINTMENT (EMERGENCY)
Dept: RADIOLOGY | Facility: HOSPITAL | Age: 86
DRG: 871 | End: 2021-01-01
Payer: MEDICARE

## 2021-01-01 ENCOUNTER — IN HOME VISIT (OUTPATIENT)
Dept: FAMILY MEDICINE CLINIC | Facility: CLINIC | Age: 86
End: 2021-01-01
Payer: MEDICARE

## 2021-01-01 ENCOUNTER — TELEPHONE (OUTPATIENT)
Dept: CARDIOLOGY CLINIC | Facility: CLINIC | Age: 86
End: 2021-01-01

## 2021-01-01 ENCOUNTER — TELEPHONE (OUTPATIENT)
Dept: FAMILY MEDICINE CLINIC | Facility: CLINIC | Age: 86
End: 2021-01-01

## 2021-01-01 ENCOUNTER — HOSPITAL ENCOUNTER (INPATIENT)
Facility: HOSPITAL | Age: 86
LOS: 2 days | DRG: 871 | End: 2021-10-28
Attending: EMERGENCY MEDICINE | Admitting: FAMILY MEDICINE
Payer: MEDICARE

## 2021-01-01 ENCOUNTER — TELEMEDICINE (OUTPATIENT)
Dept: CARDIOLOGY CLINIC | Facility: CLINIC | Age: 86
End: 2021-01-01
Payer: MEDICARE

## 2021-01-01 ENCOUNTER — APPOINTMENT (INPATIENT)
Dept: NON INVASIVE DIAGNOSTICS | Facility: HOSPITAL | Age: 86
DRG: 871 | End: 2021-01-01
Payer: MEDICARE

## 2021-01-01 VITALS
HEART RATE: 71 BPM | TEMPERATURE: 98.1 F | SYSTOLIC BLOOD PRESSURE: 132 MMHG | DIASTOLIC BLOOD PRESSURE: 69 MMHG | OXYGEN SATURATION: 92 %

## 2021-01-01 VITALS — OXYGEN SATURATION: 93 % | SYSTOLIC BLOOD PRESSURE: 126 MMHG | HEART RATE: 95 BPM | DIASTOLIC BLOOD PRESSURE: 64 MMHG

## 2021-01-01 VITALS — HEART RATE: 74 BPM | OXYGEN SATURATION: 98 %

## 2021-01-01 DIAGNOSIS — L98.429 SKIN ULCER OF SACRUM, UNSPECIFIED ULCER STAGE (HCC): ICD-10-CM

## 2021-01-01 DIAGNOSIS — I50.32 CHRONIC DIASTOLIC CONGESTIVE HEART FAILURE (HCC): ICD-10-CM

## 2021-01-01 DIAGNOSIS — G71.09 MUSCULAR DYSTROPHY, LIMB GIRDLE (HCC): Chronic | ICD-10-CM

## 2021-01-01 DIAGNOSIS — J96.22 ACUTE ON CHRONIC RESPIRATORY FAILURE WITH HYPOXIA AND HYPERCAPNIA (HCC): Primary | ICD-10-CM

## 2021-01-01 DIAGNOSIS — R06.89 DIFFICULTY BREATHING: ICD-10-CM

## 2021-01-01 DIAGNOSIS — J96.21 ACUTE ON CHRONIC RESPIRATORY FAILURE WITH HYPOXIA AND HYPERCAPNIA (HCC): Primary | ICD-10-CM

## 2021-01-01 DIAGNOSIS — R60.0 LOWER EXTREMITY EDEMA: ICD-10-CM

## 2021-01-01 DIAGNOSIS — E66.01 MORBID OBESITY (HCC): Chronic | ICD-10-CM

## 2021-01-01 DIAGNOSIS — H35.30 MACULAR DEGENERATION, UNSPECIFIED LATERALITY, UNSPECIFIED TYPE: ICD-10-CM

## 2021-01-01 DIAGNOSIS — J96.90 RESPIRATORY FAILURE (HCC): ICD-10-CM

## 2021-01-01 DIAGNOSIS — Z99.3 WHEELCHAIR DEPENDENT: ICD-10-CM

## 2021-01-01 DIAGNOSIS — I10 ESSENTIAL HYPERTENSION: Chronic | ICD-10-CM

## 2021-01-01 DIAGNOSIS — Z23 ENCOUNTER FOR IMMUNIZATION: ICD-10-CM

## 2021-01-01 DIAGNOSIS — G89.29 OTHER CHRONIC PAIN: ICD-10-CM

## 2021-01-01 DIAGNOSIS — I50.9 CHF (CONGESTIVE HEART FAILURE) (HCC): Primary | ICD-10-CM

## 2021-01-01 DIAGNOSIS — Z23 ENCOUNTER FOR IMMUNIZATION: Primary | ICD-10-CM

## 2021-01-01 DIAGNOSIS — A41.9 SEPSIS (HCC): ICD-10-CM

## 2021-01-01 DIAGNOSIS — G47.33 OSA TREATED WITH BIPAP: Chronic | ICD-10-CM

## 2021-01-01 DIAGNOSIS — M15.9 GENERALIZED OSTEOARTHRITIS: Chronic | ICD-10-CM

## 2021-01-01 DIAGNOSIS — R00.0 TACHYCARDIA: ICD-10-CM

## 2021-01-01 DIAGNOSIS — E66.01 MORBID OBESITY (HCC): ICD-10-CM

## 2021-01-01 DIAGNOSIS — H61.23 BILATERAL IMPACTED CERUMEN: ICD-10-CM

## 2021-01-01 DIAGNOSIS — J96.11 CHRONIC RESPIRATORY FAILURE WITH HYPOXIA (HCC): ICD-10-CM

## 2021-01-01 DIAGNOSIS — R00.0 INAPPROPRIATE SINUS TACHYCARDIA: ICD-10-CM

## 2021-01-01 DIAGNOSIS — G71.09 MUSCULAR DYSTROPHY, LIMB GIRDLE (HCC): Primary | Chronic | ICD-10-CM

## 2021-01-01 DIAGNOSIS — J41.0 SIMPLE CHRONIC BRONCHITIS (HCC): ICD-10-CM

## 2021-01-01 DIAGNOSIS — M15.9 GENERALIZED OSTEOARTHRITIS: ICD-10-CM

## 2021-01-01 DIAGNOSIS — J20.9 ACUTE BRONCHITIS, UNSPECIFIED ORGANISM: Primary | ICD-10-CM

## 2021-01-01 DIAGNOSIS — J18.9 PNEUMONIA: ICD-10-CM

## 2021-01-01 DIAGNOSIS — G93.40 ENCEPHALOPATHY: ICD-10-CM

## 2021-01-01 DIAGNOSIS — G71.09 MUSCULAR DYSTROPHY, LIMB GIRDLE (HCC): ICD-10-CM

## 2021-01-01 LAB
ALBUMIN SERPL BCP-MCNC: 2.2 G/DL (ref 3.5–5)
ALP SERPL-CCNC: 98 U/L (ref 46–116)
ALT SERPL W P-5'-P-CCNC: 16 U/L (ref 12–78)
ANION GAP SERPL CALCULATED.3IONS-SCNC: 14 MMOL/L (ref 4–13)
ANION GAP SERPL CALCULATED.3IONS-SCNC: 16 MMOL/L (ref 4–13)
ANION GAP SERPL CALCULATED.3IONS-SCNC: 17 MMOL/L (ref 4–13)
ANION GAP SERPL CALCULATED.3IONS-SCNC: 6 MMOL/L (ref 4–13)
AORTIC ROOT: 3 CM
ARTERIAL PATENCY WRIST A: NO
ARTERIAL PATENCY WRIST A: YES
AST SERPL W P-5'-P-CCNC: 11 U/L (ref 5–45)
BACTERIA UR CULT: NORMAL
BACTERIA UR QL AUTO: ABNORMAL /HPF
BACTERIA UR QL AUTO: ABNORMAL /HPF
BASE EX.OXY STD BLDV CALC-SCNC: 49.7 % (ref 60–80)
BASE EX.OXY STD BLDV CALC-SCNC: 54.7 % (ref 60–80)
BASE EX.OXY STD BLDV CALC-SCNC: 65.6 % (ref 60–80)
BASE EX.OXY STD BLDV CALC-SCNC: 79.9 % (ref 60–80)
BASE EX.OXY STD BLDV CALC-SCNC: 86.6 % (ref 60–80)
BASE EXCESS BLDA CALC-SCNC: -6.5 MMOL/L
BASE EXCESS BLDA CALC-SCNC: -7.8 MMOL/L
BASE EXCESS BLDV CALC-SCNC: -0.6 MMOL/L
BASE EXCESS BLDV CALC-SCNC: -1.7 MMOL/L
BASE EXCESS BLDV CALC-SCNC: -2 MMOL/L
BASE EXCESS BLDV CALC-SCNC: -2.3 MMOL/L
BASE EXCESS BLDV CALC-SCNC: -5.4 MMOL/L
BASOPHILS # BLD AUTO: 0.02 THOUSANDS/ΜL (ref 0–0.1)
BASOPHILS # BLD AUTO: 0.08 THOUSANDS/ΜL (ref 0–0.1)
BASOPHILS NFR BLD AUTO: 0 % (ref 0–1)
BASOPHILS NFR BLD AUTO: 0 % (ref 0–1)
BETA-HYDROXYBUTYRATE: 6.3 MMOL/L
BILIRUB SERPL-MCNC: 0.44 MG/DL (ref 0.2–1)
BILIRUB UR QL STRIP: ABNORMAL
BILIRUB UR QL STRIP: ABNORMAL
BODY TEMPERATURE: 96.2 DEGREES FEHRENHEIT
BODY TEMPERATURE: 96.8 DEGREES FEHRENHEIT
BUN SERPL-MCNC: 25 MG/DL (ref 5–25)
BUN SERPL-MCNC: 29 MG/DL (ref 5–25)
BUN SERPL-MCNC: 30 MG/DL (ref 5–25)
BUN SERPL-MCNC: 31 MG/DL (ref 5–25)
CALCIUM ALBUM COR SERPL-MCNC: 10.3 MG/DL (ref 8.3–10.1)
CALCIUM SERPL-MCNC: 8.5 MG/DL (ref 8.3–10.1)
CALCIUM SERPL-MCNC: 8.9 MG/DL (ref 8.3–10.1)
CALCIUM SERPL-MCNC: 9 MG/DL (ref 8.3–10.1)
CALCIUM SERPL-MCNC: 9.3 MG/DL (ref 8.3–10.1)
CHLORIDE SERPL-SCNC: 93 MMOL/L (ref 100–108)
CHLORIDE SERPL-SCNC: 96 MMOL/L (ref 100–108)
CHLORIDE SERPL-SCNC: 98 MMOL/L (ref 100–108)
CHLORIDE SERPL-SCNC: 99 MMOL/L (ref 100–108)
CLARITY UR: ABNORMAL
CLARITY UR: ABNORMAL
CO2 SERPL-SCNC: 21 MMOL/L (ref 21–32)
CO2 SERPL-SCNC: 23 MMOL/L (ref 21–32)
CO2 SERPL-SCNC: 24 MMOL/L (ref 21–32)
CO2 SERPL-SCNC: 31 MMOL/L (ref 21–32)
COLOR UR: YELLOW
COLOR UR: YELLOW
CREAT SERPL-MCNC: 0.37 MG/DL (ref 0.6–1.3)
CREAT SERPL-MCNC: 0.5 MG/DL (ref 0.6–1.3)
CREAT SERPL-MCNC: 0.57 MG/DL (ref 0.6–1.3)
CREAT SERPL-MCNC: 0.62 MG/DL (ref 0.6–1.3)
E WAVE DECELERATION TIME: 206 MS
EOSINOPHIL # BLD AUTO: 0 THOUSAND/ΜL (ref 0–0.61)
EOSINOPHIL # BLD AUTO: 0 THOUSAND/ΜL (ref 0–0.61)
EOSINOPHIL NFR BLD AUTO: 0 % (ref 0–6)
EOSINOPHIL NFR BLD AUTO: 0 % (ref 0–6)
ERYTHROCYTE [DISTWIDTH] IN BLOOD BY AUTOMATED COUNT: 15 % (ref 11.6–15.1)
ERYTHROCYTE [DISTWIDTH] IN BLOOD BY AUTOMATED COUNT: 15.4 % (ref 11.6–15.1)
ERYTHROCYTE [DISTWIDTH] IN BLOOD BY AUTOMATED COUNT: 15.5 % (ref 11.6–15.1)
FLUAV RNA RESP QL NAA+PROBE: NEGATIVE
FLUBV RNA RESP QL NAA+PROBE: NEGATIVE
FRACTIONAL SHORTENING: 32 % (ref 28–44)
GFR SERPL CREATININE-BSD FRML MDRD: 80 ML/MIN/1.73SQ M
GFR SERPL CREATININE-BSD FRML MDRD: 82 ML/MIN/1.73SQ M
GFR SERPL CREATININE-BSD FRML MDRD: 85 ML/MIN/1.73SQ M
GFR SERPL CREATININE-BSD FRML MDRD: 94 ML/MIN/1.73SQ M
GLUCOSE SERPL-MCNC: 106 MG/DL (ref 65–140)
GLUCOSE SERPL-MCNC: 118 MG/DL (ref 65–140)
GLUCOSE SERPL-MCNC: 151 MG/DL (ref 65–140)
GLUCOSE SERPL-MCNC: 158 MG/DL (ref 65–140)
GLUCOSE UR STRIP-MCNC: NEGATIVE MG/DL
GLUCOSE UR STRIP-MCNC: NEGATIVE MG/DL
HCO3 BLDA-SCNC: 18.2 MMOL/L (ref 22–28)
HCO3 BLDA-SCNC: 21.1 MMOL/L (ref 22–28)
HCO3 BLDV-SCNC: 23.1 MMOL/L (ref 24–30)
HCO3 BLDV-SCNC: 30.6 MMOL/L (ref 24–30)
HCO3 BLDV-SCNC: 30.8 MMOL/L (ref 24–30)
HCO3 BLDV-SCNC: 30.9 MMOL/L (ref 24–30)
HCO3 BLDV-SCNC: 32.6 MMOL/L (ref 24–30)
HCT VFR BLD AUTO: 34.4 % (ref 34.8–46.1)
HCT VFR BLD AUTO: 36.1 % (ref 34.8–46.1)
HCT VFR BLD AUTO: 42.7 % (ref 34.8–46.1)
HGB BLD-MCNC: 10.7 G/DL (ref 11.5–15.4)
HGB BLD-MCNC: 10.9 G/DL (ref 11.5–15.4)
HGB BLD-MCNC: 12.9 G/DL (ref 11.5–15.4)
HGB UR QL STRIP.AUTO: ABNORMAL
HGB UR QL STRIP.AUTO: ABNORMAL
HYALINE CASTS #/AREA URNS LPF: ABNORMAL /LPF
HYALINE CASTS #/AREA URNS LPF: ABNORMAL /LPF
IMM GRANULOCYTES # BLD AUTO: 0.08 THOUSAND/UL (ref 0–0.2)
IMM GRANULOCYTES # BLD AUTO: 0.25 THOUSAND/UL (ref 0–0.2)
IMM GRANULOCYTES NFR BLD AUTO: 0 % (ref 0–2)
IMM GRANULOCYTES NFR BLD AUTO: 1 % (ref 0–2)
INR PPP: 1.11 (ref 0.84–1.19)
INTERVENTRICULAR SEPTUM IN DIASTOLE (PARASTERNAL SHORT AXIS VIEW): 1.1 CM
IPAP: 20
IPAP: 20
KETONES UR STRIP-MCNC: ABNORMAL MG/DL
KETONES UR STRIP-MCNC: ABNORMAL MG/DL
L PNEUMO1 AG UR QL IA.RAPID: NEGATIVE
LACTATE SERPL-SCNC: 0.7 MMOL/L (ref 0.5–2)
LACTATE SERPL-SCNC: 0.9 MMOL/L (ref 0.5–2)
LACTATE SERPL-SCNC: 0.9 MMOL/L (ref 0.5–2)
LEFT INTERNAL DIMENSION IN SYSTOLE: 3.2 CM (ref 2.1–4)
LEFT VENTRICULAR INTERNAL DIMENSION IN DIASTOLE: 4.7 CM (ref 5.28–7.87)
LEFT VENTRICULAR POSTERIOR WALL IN END DIASTOLE: 1.1 CM
LEFT VENTRICULAR STROKE VOLUME: 60 ML
LEUKOCYTE ESTERASE UR QL STRIP: ABNORMAL
LEUKOCYTE ESTERASE UR QL STRIP: NEGATIVE
LV EF: 54 %
LYMPHOCYTES # BLD AUTO: 0.7 THOUSANDS/ΜL (ref 0.6–4.47)
LYMPHOCYTES # BLD AUTO: 1.33 THOUSANDS/ΜL (ref 0.6–4.47)
LYMPHOCYTES NFR BLD AUTO: 4 % (ref 14–44)
LYMPHOCYTES NFR BLD AUTO: 6 % (ref 14–44)
MAGNESIUM SERPL-MCNC: 1.9 MG/DL (ref 1.6–2.6)
MAGNESIUM SERPL-MCNC: 2.1 MG/DL (ref 1.6–2.6)
MCH RBC QN AUTO: 27 PG (ref 26.8–34.3)
MCH RBC QN AUTO: 27.3 PG (ref 26.8–34.3)
MCH RBC QN AUTO: 27.4 PG (ref 26.8–34.3)
MCHC RBC AUTO-ENTMCNC: 30.2 G/DL (ref 31.4–37.4)
MCHC RBC AUTO-ENTMCNC: 30.2 G/DL (ref 31.4–37.4)
MCHC RBC AUTO-ENTMCNC: 31.1 G/DL (ref 31.4–37.4)
MCV RBC AUTO: 88 FL (ref 82–98)
MCV RBC AUTO: 90 FL (ref 82–98)
MCV RBC AUTO: 90 FL (ref 82–98)
MONOCYTES # BLD AUTO: 0.83 THOUSAND/ΜL (ref 0.17–1.22)
MONOCYTES # BLD AUTO: 1.06 THOUSAND/ΜL (ref 0.17–1.22)
MONOCYTES NFR BLD AUTO: 4 % (ref 4–12)
MONOCYTES NFR BLD AUTO: 5 % (ref 4–12)
MRSA NOSE QL CULT: NORMAL
MV E'TISSUE VEL-LAT: 6 CM/S
MV E'TISSUE VEL-SEP: 5 CM/S
MV PEAK A VEL: 0.91 M/S
MV PEAK E VEL: 67 CM/S
MV STENOSIS PRESSURE HALF TIME: 0 MS
NEUTROPHILS # BLD AUTO: 16.48 THOUSANDS/ΜL (ref 1.85–7.62)
NEUTROPHILS # BLD AUTO: 21.13 THOUSANDS/ΜL (ref 1.85–7.62)
NEUTS SEG NFR BLD AUTO: 89 % (ref 43–75)
NEUTS SEG NFR BLD AUTO: 91 % (ref 43–75)
NITRITE UR QL STRIP: NEGATIVE
NITRITE UR QL STRIP: NEGATIVE
NON VENT- BIPAP: ABNORMAL
NON VENT- BIPAP: ABNORMAL
NON-SQ EPI CELLS URNS QL MICRO: ABNORMAL /HPF
NON-SQ EPI CELLS URNS QL MICRO: ABNORMAL /HPF
NRBC BLD AUTO-RTO: 0 /100 WBCS
NRBC BLD AUTO-RTO: 0 /100 WBCS
NT-PROBNP SERPL-MCNC: 1967 PG/ML
O2 CT BLDA-SCNC: 15 ML/DL (ref 16–23)
O2 CT BLDA-SCNC: 15.5 ML/DL (ref 16–23)
O2 CT BLDV-SCNC: 10.1 ML/DL
O2 CT BLDV-SCNC: 10.2 ML/DL
O2 CT BLDV-SCNC: 10.6 ML/DL
O2 CT BLDV-SCNC: 15.5 ML/DL
O2 CT BLDV-SCNC: 17.1 ML/DL
OXYHGB MFR BLDA: 91.5 % (ref 94–97)
OXYHGB MFR BLDA: 93.4 % (ref 94–97)
PCO2 BLDA: 38.8 MM HG (ref 36–44)
PCO2 BLDA: 51.4 MM HG (ref 36–44)
PCO2 BLDV: 102.8 MM HG (ref 42–50)
PCO2 BLDV: 102.9 MM HG (ref 42–50)
PCO2 BLDV: 122.9 MM HG (ref 42–50)
PCO2 BLDV: 59.4 MM HG (ref 42–50)
PCO2 BLDV: 91.7 MM HG (ref 42–50)
PCO2 TEMP ADJ BLDA: 37.1 MM HG (ref 36–44)
PCO2 TEMP ADJ BLDA: 48.6 MM HG (ref 36–44)
PEEP MAX SETTING VENT: 6 CM[H2O]
PEEP MAX SETTING VENT: 6 CM[H2O]
PH BLD: 7.25 [PH] (ref 7.35–7.45)
PH BLD: 7.3 [PH] (ref 7.35–7.45)
PH BLDA: 7.23 [PH] (ref 7.35–7.45)
PH BLDA: 7.29 [PH] (ref 7.35–7.45)
PH BLDV: 7.04 [PH] (ref 7.3–7.4)
PH BLDV: 7.09 [PH] (ref 7.3–7.4)
PH BLDV: 7.1 [PH] (ref 7.3–7.4)
PH BLDV: 7.14 [PH] (ref 7.3–7.4)
PH BLDV: 7.21 [PH] (ref 7.3–7.4)
PH UR STRIP.AUTO: 5.5 [PH]
PH UR STRIP.AUTO: 5.5 [PH]
PLATELET # BLD AUTO: 272 THOUSANDS/UL (ref 149–390)
PLATELET # BLD AUTO: 322 THOUSANDS/UL (ref 149–390)
PLATELET # BLD AUTO: 397 THOUSANDS/UL (ref 149–390)
PMV BLD AUTO: 10 FL (ref 8.9–12.7)
PMV BLD AUTO: 9.1 FL (ref 8.9–12.7)
PMV BLD AUTO: 9.2 FL (ref 8.9–12.7)
PO2 BLD: 61 MM HG (ref 75–129)
PO2 BLD: 66 MM HG (ref 75–129)
PO2 BLDA: 66.7 MM HG (ref 75–129)
PO2 BLDA: 70.6 MM HG (ref 75–129)
PO2 BLDV: 31.6 MM HG (ref 35–45)
PO2 BLDV: 32.4 MM HG (ref 35–45)
PO2 BLDV: 36.1 MM HG (ref 35–45)
PO2 BLDV: 57.4 MM HG (ref 35–45)
PO2 BLDV: 66.5 MM HG (ref 35–45)
POTASSIUM SERPL-SCNC: 3.4 MMOL/L (ref 3.5–5.3)
POTASSIUM SERPL-SCNC: 3.5 MMOL/L (ref 3.5–5.3)
POTASSIUM SERPL-SCNC: 3.7 MMOL/L (ref 3.5–5.3)
POTASSIUM SERPL-SCNC: 5.1 MMOL/L (ref 3.5–5.3)
PROCALCITONIN SERPL-MCNC: 0.43 NG/ML
PROCALCITONIN SERPL-MCNC: 0.57 NG/ML
PROCALCITONIN SERPL-MCNC: 0.8 NG/ML
PROT SERPL-MCNC: 5.9 G/DL (ref 6.4–8.2)
PROT UR STRIP-MCNC: ABNORMAL MG/DL
PROT UR STRIP-MCNC: ABNORMAL MG/DL
PROTHROMBIN TIME: 14.1 SECONDS (ref 11.6–14.5)
RA PRESSURE ESTIMATED: 8 MMHG
RBC # BLD AUTO: 3.91 MILLION/UL (ref 3.81–5.12)
RBC # BLD AUTO: 4.03 MILLION/UL (ref 3.81–5.12)
RBC # BLD AUTO: 4.73 MILLION/UL (ref 3.81–5.12)
RBC #/AREA URNS AUTO: ABNORMAL /HPF
RBC #/AREA URNS AUTO: ABNORMAL /HPF
RSV RNA RESP QL NAA+PROBE: NEGATIVE
RV PSP: 66 MMHG
S PNEUM AG UR QL: NEGATIVE
SARS-COV-2 RNA RESP QL NAA+PROBE: NEGATIVE
SL CV LV EF: 60
SL CV PED ECHO LEFT VENTRICLE DIASTOLIC VOLUME (MOD BIPLANE) 2D: 101 ML
SL CV PED ECHO LEFT VENTRICLE SYSTOLIC VOLUME (MOD BIPLANE) 2D: 42 ML
SODIUM SERPL-SCNC: 130 MMOL/L (ref 136–145)
SODIUM SERPL-SCNC: 135 MMOL/L (ref 136–145)
SODIUM SERPL-SCNC: 136 MMOL/L (ref 136–145)
SODIUM SERPL-SCNC: 137 MMOL/L (ref 136–145)
SP GR UR STRIP.AUTO: 1.02 (ref 1–1.03)
SP GR UR STRIP.AUTO: >=1.03 (ref 1–1.03)
SPECIMEN SOURCE: ABNORMAL
SPECIMEN SOURCE: ABNORMAL
TR PEAK VELOCITY: 3.8 M/S
TRICUSPID ANNULAR PLANE SYSTOLIC EXCURSION: 1.9 CM
TRICUSPID VALVE PEAK REGURGITATION VELOCITY: 3.83 M/S
TRICUSPID VALVE S': 0.7 CM/S
TROPONIN I SERPL-MCNC: <0.02 NG/ML
TV PEAK GRADIENT: 59 MMHG
UROBILINOGEN UR QL STRIP.AUTO: 0.2 E.U./DL
UROBILINOGEN UR QL STRIP.AUTO: 0.2 E.U./DL
VENT BIPAP FIO2: 30 %
VENT BIPAP FIO2: 30 %
WBC # BLD AUTO: 11.17 THOUSAND/UL (ref 4.31–10.16)
WBC # BLD AUTO: 18.11 THOUSAND/UL (ref 4.31–10.16)
WBC # BLD AUTO: 23.85 THOUSAND/UL (ref 4.31–10.16)
WBC #/AREA URNS AUTO: ABNORMAL /HPF
WBC #/AREA URNS AUTO: ABNORMAL /HPF
Z-SCORE OF LEFT VENTRICULAR DIMENSION IN END SYSTOLE: -3.16

## 2021-01-01 PROCEDURE — 82805 BLOOD GASES W/O2 SATURATION: CPT | Performed by: EMERGENCY MEDICINE

## 2021-01-01 PROCEDURE — G0008 ADMIN INFLUENZA VIRUS VAC: HCPCS | Performed by: FAMILY MEDICINE

## 2021-01-01 PROCEDURE — 87086 URINE CULTURE/COLONY COUNT: CPT | Performed by: INTERNAL MEDICINE

## 2021-01-01 PROCEDURE — 80048 BASIC METABOLIC PNL TOTAL CA: CPT | Performed by: EMERGENCY MEDICINE

## 2021-01-01 PROCEDURE — 83735 ASSAY OF MAGNESIUM: CPT | Performed by: NURSE PRACTITIONER

## 2021-01-01 PROCEDURE — 71250 CT THORAX DX C-: CPT

## 2021-01-01 PROCEDURE — 87449 NOS EACH ORGANISM AG IA: CPT | Performed by: PHYSICIAN ASSISTANT

## 2021-01-01 PROCEDURE — 87040 BLOOD CULTURE FOR BACTERIA: CPT | Performed by: EMERGENCY MEDICINE

## 2021-01-01 PROCEDURE — 87077 CULTURE AEROBIC IDENTIFY: CPT | Performed by: EMERGENCY MEDICINE

## 2021-01-01 PROCEDURE — 99291 CRITICAL CARE FIRST HOUR: CPT | Performed by: EMERGENCY MEDICINE

## 2021-01-01 PROCEDURE — 99291 CRITICAL CARE FIRST HOUR: CPT | Performed by: NURSE PRACTITIONER

## 2021-01-01 PROCEDURE — 92610 EVALUATE SWALLOWING FUNCTION: CPT

## 2021-01-01 PROCEDURE — 85025 COMPLETE CBC W/AUTO DIFF WBC: CPT | Performed by: NURSE PRACTITIONER

## 2021-01-01 PROCEDURE — 94640 AIRWAY INHALATION TREATMENT: CPT

## 2021-01-01 PROCEDURE — 82805 BLOOD GASES W/O2 SATURATION: CPT | Performed by: PHYSICIAN ASSISTANT

## 2021-01-01 PROCEDURE — 96367 TX/PROPH/DG ADDL SEQ IV INF: CPT

## 2021-01-01 PROCEDURE — 94002 VENT MGMT INPAT INIT DAY: CPT

## 2021-01-01 PROCEDURE — 93005 ELECTROCARDIOGRAM TRACING: CPT

## 2021-01-01 PROCEDURE — 36415 COLL VENOUS BLD VENIPUNCTURE: CPT | Performed by: EMERGENCY MEDICINE

## 2021-01-01 PROCEDURE — 94668 MNPJ CHEST WALL SBSQ: CPT

## 2021-01-01 PROCEDURE — 80048 BASIC METABOLIC PNL TOTAL CA: CPT | Performed by: NURSE PRACTITIONER

## 2021-01-01 PROCEDURE — 94760 N-INVAS EAR/PLS OXIMETRY 1: CPT

## 2021-01-01 PROCEDURE — 82805 BLOOD GASES W/O2 SATURATION: CPT | Performed by: INTERNAL MEDICINE

## 2021-01-01 PROCEDURE — 85027 COMPLETE CBC AUTOMATED: CPT | Performed by: NURSE PRACTITIONER

## 2021-01-01 PROCEDURE — 73560 X-RAY EXAM OF KNEE 1 OR 2: CPT

## 2021-01-01 PROCEDURE — 99285 EMERGENCY DEPT VISIT HI MDM: CPT

## 2021-01-01 PROCEDURE — 84484 ASSAY OF TROPONIN QUANT: CPT | Performed by: EMERGENCY MEDICINE

## 2021-01-01 PROCEDURE — 84145 PROCALCITONIN (PCT): CPT | Performed by: EMERGENCY MEDICINE

## 2021-01-01 PROCEDURE — 87081 CULTURE SCREEN ONLY: CPT | Performed by: INTERNAL MEDICINE

## 2021-01-01 PROCEDURE — 94664 DEMO&/EVAL PT USE INHALER: CPT

## 2021-01-01 PROCEDURE — 96361 HYDRATE IV INFUSION ADD-ON: CPT

## 2021-01-01 PROCEDURE — 99292 CRITICAL CARE ADDL 30 MIN: CPT | Performed by: EMERGENCY MEDICINE

## 2021-01-01 PROCEDURE — 84145 PROCALCITONIN (PCT): CPT | Performed by: NURSE PRACTITIONER

## 2021-01-01 PROCEDURE — 71045 X-RAY EXAM CHEST 1 VIEW: CPT

## 2021-01-01 PROCEDURE — 94669 MECHANICAL CHEST WALL OSCILL: CPT

## 2021-01-01 PROCEDURE — 83605 ASSAY OF LACTIC ACID: CPT | Performed by: EMERGENCY MEDICINE

## 2021-01-01 PROCEDURE — 74176 CT ABD & PELVIS W/O CONTRAST: CPT

## 2021-01-01 PROCEDURE — 99232 SBSQ HOSP IP/OBS MODERATE 35: CPT | Performed by: INTERNAL MEDICINE

## 2021-01-01 PROCEDURE — 83605 ASSAY OF LACTIC ACID: CPT | Performed by: NURSE PRACTITIONER

## 2021-01-01 PROCEDURE — 82010 KETONE BODYS QUAN: CPT | Performed by: NURSE PRACTITIONER

## 2021-01-01 PROCEDURE — 93306 TTE W/DOPPLER COMPLETE: CPT | Performed by: INTERNAL MEDICINE

## 2021-01-01 PROCEDURE — 85025 COMPLETE CBC W/AUTO DIFF WBC: CPT | Performed by: EMERGENCY MEDICINE

## 2021-01-01 PROCEDURE — 0241U HB NFCT DS VIR RESP RNA 4 TRGT: CPT | Performed by: EMERGENCY MEDICINE

## 2021-01-01 PROCEDURE — 96365 THER/PROPH/DIAG IV INF INIT: CPT

## 2021-01-01 PROCEDURE — 83880 ASSAY OF NATRIURETIC PEPTIDE: CPT | Performed by: EMERGENCY MEDICINE

## 2021-01-01 PROCEDURE — 81001 URINALYSIS AUTO W/SCOPE: CPT | Performed by: INTERNAL MEDICINE

## 2021-01-01 PROCEDURE — 85610 PROTHROMBIN TIME: CPT | Performed by: EMERGENCY MEDICINE

## 2021-01-01 PROCEDURE — 87070 CULTURE OTHR SPECIMN AEROBIC: CPT | Performed by: PHYSICIAN ASSISTANT

## 2021-01-01 PROCEDURE — 99348 HOME/RES VST EST LOW MDM 30: CPT | Performed by: FAMILY MEDICINE

## 2021-01-01 PROCEDURE — 99213 OFFICE O/P EST LOW 20 MIN: CPT | Performed by: INTERNAL MEDICINE

## 2021-01-01 PROCEDURE — 83605 ASSAY OF LACTIC ACID: CPT | Performed by: PHYSICIAN ASSISTANT

## 2021-01-01 PROCEDURE — 94660 CPAP INITIATION&MGMT: CPT

## 2021-01-01 PROCEDURE — G1004 CDSM NDSC: HCPCS

## 2021-01-01 PROCEDURE — 80053 COMPREHEN METABOLIC PANEL: CPT | Performed by: NURSE PRACTITIONER

## 2021-01-01 PROCEDURE — 87205 SMEAR GRAM STAIN: CPT | Performed by: PHYSICIAN ASSISTANT

## 2021-01-01 PROCEDURE — 90662 IIV NO PRSV INCREASED AG IM: CPT | Performed by: FAMILY MEDICINE

## 2021-01-01 PROCEDURE — 99349 HOME/RES VST EST MOD MDM 40: CPT | Performed by: FAMILY MEDICINE

## 2021-01-01 PROCEDURE — 81001 URINALYSIS AUTO W/SCOPE: CPT | Performed by: PHYSICIAN ASSISTANT

## 2021-01-01 PROCEDURE — 93306 TTE W/DOPPLER COMPLETE: CPT

## 2021-01-01 RX ORDER — MUPIROCIN CALCIUM 20 MG/G
CREAM TOPICAL
COMMUNITY
Start: 2016-11-25 | End: 2021-10-28 | Stop reason: HOSPADM

## 2021-01-01 RX ORDER — CEFEPIME HYDROCHLORIDE 2 G/50ML
2000 INJECTION, SOLUTION INTRAVENOUS ONCE
Status: COMPLETED | OUTPATIENT
Start: 2021-01-01 | End: 2021-01-01

## 2021-01-01 RX ORDER — METOPROLOL TARTRATE 5 MG/5ML
5 INJECTION INTRAVENOUS ONCE
Status: COMPLETED | OUTPATIENT
Start: 2021-01-01 | End: 2021-01-01

## 2021-01-01 RX ORDER — SODIUM CHLORIDE 30 MG/ML INHALATION SOLUTION 30 MG/ML
4 SOLUTION INHALANT
Status: DISCONTINUED | OUTPATIENT
Start: 2021-01-01 | End: 2021-10-28 | Stop reason: HOSPADM

## 2021-01-01 RX ORDER — FUROSEMIDE 20 MG/1
20 TABLET ORAL EVERY OTHER DAY
Qty: 45 TABLET | Refills: 1 | OUTPATIENT
Start: 2021-01-01 | End: 2021-10-28 | Stop reason: HOSPADM

## 2021-01-01 RX ORDER — POTASSIUM CHLORIDE 14.9 MG/ML
20 INJECTION INTRAVENOUS ONCE
Status: COMPLETED | OUTPATIENT
Start: 2021-01-01 | End: 2021-01-01

## 2021-01-01 RX ORDER — METHYLPREDNISOLONE SODIUM SUCCINATE 40 MG/ML
40 INJECTION, POWDER, LYOPHILIZED, FOR SOLUTION INTRAMUSCULAR; INTRAVENOUS EVERY 12 HOURS SCHEDULED
Status: DISCONTINUED | OUTPATIENT
Start: 2021-01-01 | End: 2021-10-28 | Stop reason: HOSPADM

## 2021-01-01 RX ORDER — ALBUMIN, HUMAN INJ 5% 5 %
12.5 SOLUTION INTRAVENOUS ONCE
Status: COMPLETED | OUTPATIENT
Start: 2021-01-01 | End: 2021-01-01

## 2021-01-01 RX ORDER — FLUTICASONE PROPIONATE 50 MCG
SPRAY, SUSPENSION (ML) NASAL 2 TIMES DAILY
COMMUNITY
Start: 2014-05-23 | End: 2021-10-28 | Stop reason: HOSPADM

## 2021-01-01 RX ORDER — IPRATROPIUM BROMIDE AND ALBUTEROL SULFATE 2.5; .5 MG/3ML; MG/3ML
3 SOLUTION RESPIRATORY (INHALATION) 4 TIMES DAILY
Qty: 360 ML | Refills: 11 | Status: SHIPPED | OUTPATIENT
Start: 2021-01-01 | End: 2021-01-01 | Stop reason: SDUPTHER

## 2021-01-01 RX ORDER — DEXTROSE, SODIUM CHLORIDE, SODIUM LACTATE, POTASSIUM CHLORIDE, AND CALCIUM CHLORIDE 5; .6; .31; .03; .02 G/100ML; G/100ML; G/100ML; G/100ML; G/100ML
75 INJECTION, SOLUTION INTRAVENOUS CONTINUOUS
Status: DISCONTINUED | OUTPATIENT
Start: 2021-01-01 | End: 2021-01-01

## 2021-01-01 RX ORDER — IPRATROPIUM BROMIDE AND ALBUTEROL SULFATE 2.5; .5 MG/3ML; MG/3ML
3 SOLUTION RESPIRATORY (INHALATION) 4 TIMES DAILY
Qty: 360 ML | Refills: 11 | Status: SHIPPED | OUTPATIENT
Start: 2021-01-01 | End: 2021-10-28 | Stop reason: HOSPADM

## 2021-01-01 RX ORDER — FUROSEMIDE 10 MG/ML
20 INJECTION INTRAMUSCULAR; INTRAVENOUS ONCE
Status: DISCONTINUED | OUTPATIENT
Start: 2021-01-01 | End: 2021-01-01

## 2021-01-01 RX ORDER — IPRATROPIUM BROMIDE AND ALBUTEROL SULFATE 2.5; .5 MG/3ML; MG/3ML
3 SOLUTION RESPIRATORY (INHALATION)
Status: DISCONTINUED | OUTPATIENT
Start: 2021-01-01 | End: 2021-10-28 | Stop reason: HOSPADM

## 2021-01-01 RX ORDER — CEFTRIAXONE 1 G/50ML
1000 INJECTION, SOLUTION INTRAVENOUS EVERY 24 HOURS
Status: DISCONTINUED | OUTPATIENT
Start: 2021-01-01 | End: 2021-10-28 | Stop reason: HOSPADM

## 2021-01-01 RX ORDER — AMOXICILLIN AND CLAVULANATE POTASSIUM 400; 57 MG/5ML; MG/5ML
800 POWDER, FOR SUSPENSION ORAL 2 TIMES DAILY
Qty: 140 ML | Refills: 0 | Status: SHIPPED | OUTPATIENT
Start: 2021-01-01 | End: 2021-10-28 | Stop reason: HOSPADM

## 2021-01-01 RX ORDER — VIT A/VIT C/VIT E/ZINC/COPPER 4296-226
1 CAPSULE ORAL DAILY
COMMUNITY
End: 2021-10-28 | Stop reason: HOSPADM

## 2021-01-01 RX ORDER — DILTIAZEM HYDROCHLORIDE 120 MG/1
CAPSULE, EXTENDED RELEASE ORAL EVERY 12 HOURS
COMMUNITY
Start: 2016-02-04 | End: 2021-01-01

## 2021-01-01 RX ORDER — DILTIAZEM HYDROCHLORIDE 240 MG/1
240 CAPSULE, COATED, EXTENDED RELEASE ORAL DAILY
Status: DISCONTINUED | OUTPATIENT
Start: 2021-01-01 | End: 2021-01-01

## 2021-01-01 RX ORDER — BENZONATATE 100 MG/1
100 CAPSULE ORAL 3 TIMES DAILY PRN
Qty: 20 CAPSULE | Refills: 0 | Status: SHIPPED | OUTPATIENT
Start: 2021-01-01 | End: 2021-10-28 | Stop reason: HOSPADM

## 2021-01-01 RX ORDER — NYSTATIN 100000 [USP'U]/G
POWDER TOPICAL 2 TIMES DAILY
Status: DISCONTINUED | OUTPATIENT
Start: 2021-01-01 | End: 2021-10-28 | Stop reason: HOSPADM

## 2021-01-01 RX ORDER — DIAPER,BRIEF,INFANT-TODD,DISP
EACH MISCELLANEOUS 2 TIMES DAILY
COMMUNITY
Start: 2015-10-13 | End: 2021-10-28 | Stop reason: HOSPADM

## 2021-01-01 RX ORDER — DILTIAZEM HYDROCHLORIDE 240 MG/1
240 CAPSULE, COATED, EXTENDED RELEASE ORAL DAILY
Qty: 90 CAPSULE | Refills: 3 | Status: SHIPPED | OUTPATIENT
Start: 2021-01-01 | End: 2021-10-28 | Stop reason: HOSPADM

## 2021-01-01 RX ORDER — SODIUM CHLORIDE, SODIUM GLUCONATE, SODIUM ACETATE, POTASSIUM CHLORIDE, MAGNESIUM CHLORIDE, SODIUM PHOSPHATE, DIBASIC, AND POTASSIUM PHOSPHATE .53; .5; .37; .037; .03; .012; .00082 G/100ML; G/100ML; G/100ML; G/100ML; G/100ML; G/100ML; G/100ML
100 INJECTION, SOLUTION INTRAVENOUS CONTINUOUS
Status: DISCONTINUED | OUTPATIENT
Start: 2021-01-01 | End: 2021-01-01

## 2021-01-01 RX ADMIN — DEXTROSE, SODIUM CHLORIDE, SODIUM LACTATE, POTASSIUM CHLORIDE, AND CALCIUM CHLORIDE 75 ML/HR: 5; .6; .31; .03; .02 INJECTION, SOLUTION INTRAVENOUS at 22:35

## 2021-01-01 RX ADMIN — METOPROLOL TARTRATE 25 MG: 25 TABLET, FILM COATED ORAL at 20:39

## 2021-01-01 RX ADMIN — CEFEPIME HYDROCHLORIDE 2000 MG: 2 INJECTION, SOLUTION INTRAVENOUS at 21:56

## 2021-01-01 RX ADMIN — SODIUM CHLORIDE 1000 ML: 0.9 INJECTION, SOLUTION INTRAVENOUS at 01:15

## 2021-01-01 RX ADMIN — METOPROLOL TARTRATE 5 MG: 5 INJECTION INTRAVENOUS at 08:20

## 2021-01-01 RX ADMIN — METRONIDAZOLE 500 MG: 500 INJECTION, SOLUTION INTRAVENOUS at 09:02

## 2021-01-01 RX ADMIN — IPRATROPIUM BROMIDE AND ALBUTEROL SULFATE 3 ML: 2.5; .5 SOLUTION RESPIRATORY (INHALATION) at 01:03

## 2021-01-01 RX ADMIN — METRONIDAZOLE 500 MG: 500 INJECTION, SOLUTION INTRAVENOUS at 15:57

## 2021-01-01 RX ADMIN — ENOXAPARIN SODIUM 40 MG: 40 INJECTION SUBCUTANEOUS at 08:22

## 2021-01-01 RX ADMIN — IPRATROPIUM BROMIDE AND ALBUTEROL SULFATE 3 ML: 2.5; .5 SOLUTION RESPIRATORY (INHALATION) at 14:12

## 2021-01-01 RX ADMIN — METOPROLOL TARTRATE 25 MG: 25 TABLET, FILM COATED ORAL at 09:14

## 2021-01-01 RX ADMIN — AZITHROMYCIN MONOHYDRATE 500 MG: 500 INJECTION, POWDER, LYOPHILIZED, FOR SOLUTION INTRAVENOUS at 06:16

## 2021-01-01 RX ADMIN — CEFTRIAXONE 1000 MG: 1 INJECTION, SOLUTION INTRAVENOUS at 10:04

## 2021-01-01 RX ADMIN — IPRATROPIUM BROMIDE AND ALBUTEROL SULFATE 3 ML: 2.5; .5 SOLUTION RESPIRATORY (INHALATION) at 07:29

## 2021-01-01 RX ADMIN — SODIUM CHLORIDE SOLN NEBU 3% 4 ML: 3 NEBU SOLN at 14:43

## 2021-01-01 RX ADMIN — SODIUM CHLORIDE, SODIUM GLUCONATE, SODIUM ACETATE, POTASSIUM CHLORIDE, MAGNESIUM CHLORIDE, SODIUM PHOSPHATE, DIBASIC, AND POTASSIUM PHOSPHATE 100 ML/HR: .53; .5; .37; .037; .03; .012; .00082 INJECTION, SOLUTION INTRAVENOUS at 22:16

## 2021-01-01 RX ADMIN — IPRATROPIUM BROMIDE AND ALBUTEROL SULFATE 3 ML: 2.5; .5 SOLUTION RESPIRATORY (INHALATION) at 20:08

## 2021-01-01 RX ADMIN — SODIUM CHLORIDE SOLN NEBU 3% 4 ML: 3 NEBU SOLN at 20:08

## 2021-01-01 RX ADMIN — IPRATROPIUM BROMIDE AND ALBUTEROL SULFATE 3 ML: 2.5; .5 SOLUTION RESPIRATORY (INHALATION) at 19:53

## 2021-01-01 RX ADMIN — POTASSIUM CHLORIDE 20 MEQ: 14.9 INJECTION, SOLUTION INTRAVENOUS at 08:16

## 2021-01-01 RX ADMIN — IPRATROPIUM BROMIDE AND ALBUTEROL SULFATE 3 ML: 2.5; .5 SOLUTION RESPIRATORY (INHALATION) at 08:20

## 2021-01-01 RX ADMIN — METHYLPREDNISOLONE SODIUM SUCCINATE 40 MG: 40 INJECTION, POWDER, FOR SOLUTION INTRAMUSCULAR; INTRAVENOUS at 20:55

## 2021-01-01 RX ADMIN — METRONIDAZOLE 500 MG: 500 INJECTION, SOLUTION INTRAVENOUS at 23:53

## 2021-01-01 RX ADMIN — METRONIDAZOLE 500 MG: 500 INJECTION, SOLUTION INTRAVENOUS at 17:10

## 2021-01-01 RX ADMIN — AZITHROMYCIN MONOHYDRATE 500 MG: 500 INJECTION, POWDER, LYOPHILIZED, FOR SOLUTION INTRAVENOUS at 08:16

## 2021-01-01 RX ADMIN — METHYLPREDNISOLONE SODIUM SUCCINATE 40 MG: 40 INJECTION, POWDER, FOR SOLUTION INTRAMUSCULAR; INTRAVENOUS at 09:08

## 2021-01-01 RX ADMIN — SODIUM CHLORIDE SOLN NEBU 3% 4 ML: 3 NEBU SOLN at 07:29

## 2021-01-01 RX ADMIN — IPRATROPIUM BROMIDE AND ALBUTEROL SULFATE 3 ML: 2.5; .5 SOLUTION RESPIRATORY (INHALATION) at 14:43

## 2021-01-01 RX ADMIN — ENOXAPARIN SODIUM 40 MG: 40 INJECTION SUBCUTANEOUS at 09:08

## 2021-01-01 RX ADMIN — POTASSIUM CHLORIDE 20 MEQ: 14.9 INJECTION, SOLUTION INTRAVENOUS at 22:53

## 2021-01-01 RX ADMIN — METHYLPREDNISOLONE SODIUM SUCCINATE 40 MG: 40 INJECTION, POWDER, FOR SOLUTION INTRAMUSCULAR; INTRAVENOUS at 20:40

## 2021-01-01 RX ADMIN — METRONIDAZOLE 500 MG: 500 INJECTION, SOLUTION INTRAVENOUS at 00:20

## 2021-01-01 RX ADMIN — SODIUM CHLORIDE, SODIUM GLUCONATE, SODIUM ACETATE, POTASSIUM CHLORIDE, MAGNESIUM CHLORIDE, SODIUM PHOSPHATE, DIBASIC, AND POTASSIUM PHOSPHATE 100 ML/HR: .53; .5; .37; .037; .03; .012; .00082 INJECTION, SOLUTION INTRAVENOUS at 11:40

## 2021-01-01 RX ADMIN — POTASSIUM CHLORIDE 20 MEQ: 14.9 INJECTION, SOLUTION INTRAVENOUS at 10:25

## 2021-01-01 RX ADMIN — CEFTRIAXONE 1000 MG: 1 INJECTION, SOLUTION INTRAVENOUS at 10:12

## 2021-01-01 RX ADMIN — NYSTATIN 1 APPLICATION: 100000 POWDER TOPICAL at 17:11

## 2021-01-01 RX ADMIN — ALBUMIN (HUMAN) 12.5 G: 12.5 INJECTION, SOLUTION INTRAVENOUS at 02:10

## 2021-01-01 RX ADMIN — METHYLPREDNISOLONE SODIUM SUCCINATE 40 MG: 40 INJECTION, POWDER, FOR SOLUTION INTRAMUSCULAR; INTRAVENOUS at 07:05

## 2021-02-19 NOTE — PROGRESS NOTES
Received phone call from patients daughter [facility] wheelchair order was placed as motorized and as a result wheelchair has not been received  Order reviewed  CM ordered a bariatric wheelchair, not motorized  It was suggested that CM reach out to Lily oCrtez from [facility] for clarification  Outreach to Lily Cortez at [facility]  Orders and documentation reviewed  It is noted that in home visit notes a "motorized wheelchair" was noted  This however is not what CM submitted on original order  Lily Cortez advised that another home visit needs to be done with supporting documentation that Medicare requires for a  Wheelchair  Daughter made aware of above  In basket message sent to Dr Michelle Dorsey requesting a home visit

## 2021-04-12 NOTE — PROGRESS NOTES
Assessment/Plan:      Diagnoses and all orders for this visit:    Acute on chronic respiratory failure with hypoxia and hypercapnia (HCC)    Essential hypertension    Chronic diastolic congestive heart failure (HCC)    Muscular dystrophy, limb girdle    Wheelchair dependent    Morbid obesity (HCC)          Subjective:     Patient ID: Ibrahima Blanco is a 80 y o  female With a past medical history of muscular dystrophy, baseline mobility, wheelchair dependency, heart failure preserved with preserved ejection fraction, hypertension, PORTIA,  Who is being seen today for well check as well as recertification for wheelchair need  Patient has a history of progressive muscular dystrophy with significant impairments  She has no strength or ability to move her lower extremities at this time  She will gain significant benefit from a wheelchair as relates to activities of daily living MRADLS  Her mobility mentation is such that it cannot be resolved by cane or walker  The [facility] will significantly improve the patient's ability to participate in MRADLs and will be used regularly in the home  The patient has not expressed an unwillingness to use the manual year trial   The patient has sufficient upper extremity function and other physical and mental capabilities to safely self propel the wheelchair and also does have a caregiver who is available and willing to provide assistance  She is compliant with all home medications at this time  She denies any complaints today  Review of Systems   All other systems reviewed and are negative  Objective:  BP: 148/70  Sp02: 97% on 2 5 L NC   Physical Exam  Constitutional:       Appearance: She is obese  Cardiovascular:      Rate and Rhythm: Normal rate and regular rhythm  Pulmonary:      Breath sounds: Normal breath sounds  Abdominal:      Palpations: Abdomen is soft  Musculoskeletal:      Comments: 0-1/5 Strength in bilateral lower extremities             Skin: Comments: Ecchymosis over right lower extremity   Neurological:      Mental Status: She is oriented to person, place, and time  Motor: Weakness present  Comments: Immobile   Unable to self transfer  Requires Kevin lift

## 2021-05-07 NOTE — TELEPHONE ENCOUNTER
Dr Edwar Castellanos,     Would you please order the [facility]NA consult for her?  [facility] result message and instructions conveyed to patient. She verbalized understanding. She does not want an allergy referral at this time and had no further questions.

## 2021-05-25 NOTE — PROGRESS NOTES
Received phone call from patients daughter  Daughter states wheelchair was never received  States she is currently speaking to [employer] from [facility]  Requested that CM speak to [facility] regarding wheelchair order  Per Caesar's request all documents for wheelchair faxed to him for review and processing

## 2021-05-27 NOTE — PROGRESS NOTES
Received phone call from patients daughter  Advises that new rx needs to be sent to [facility]  Order rewritten with revisions per daughter  Revised order faxed to [facility]

## 2021-06-08 NOTE — PROGRESS NOTES
Received phone call from patients daughter Raine Rosalva Blackwell was advised that [facility] should come to the home and do a PT wheelchair evaluation  Once that is done a home visit must be done within 30 days by Dr Yaquelin Rodriguez  Once that is done a wheelchair can be ordered  Outreach to [employer]  Advised that they do not do these evaluations unless the patient has an open active case with them  Outreach to Raine Blackwell, patients daughter  Advised of above  Raine Blackwell reports that they are no longer dealing with [facility]  They are using a company by the name of [employer] in [address] [place] [address]  Encouraged Raine Blackwell to reach out to the company for further suggestions  Outreach to [facility]  They do PT evaluations but do not do wheelchair fitting and measurement  Outreach to [facility]  S/W Isreal Long advised that 24 inch recliner wheelchairs are not available  The largest size that reclines inches  According to Isreal Long size of wheelchair is dependent on patients height and weight  Recorded height and weight for patient is 67 inches and 275 pounds  This puts patient into a 22/24 inch wheelchair  Outreach to patients daughter Raine Blackwell  Above information provided  Advised that [facility] rehab does not do wheelchair measurements  Questioned if patient might weigh more than 275 pounds since the 22 inch wheelchair is reported to be too small  Raine Blackwell advises that patient is a big woman and can potentially weigh more than the stated weigh  Patients granddaughter will investigate options for wheelchair  Raine Blackwell will reach out to  once more information is obtained

## 2021-06-10 NOTE — PROGRESS NOTES
Received phone call from both patients daughter and patients granddaughter  They both continue to work on procuring a suitable wheelchair for patient  They advise that they found a [facility] [phone] that will make a custom wheelchair for patient  They will also come out and do measurements  Advised family to contact their representative and provide him/her with CM contact information  CM will follow up once contacted by a representative from [address] [place] [address]

## 2021-06-17 NOTE — PROGRESS NOTES
Returned daughters phone call  Pts grand daughter was also on the call  Questions regarding status of wheelchair  Explained paperwork was received from Ros Minor at [facility]  Explained to both individuals the amount of documentation that is required to get a specialized special order rehab wheelchair  Family declined 22 inch wheelchair that was provided by [facility], stating that it was too small  Explained based on patients height and weight, per [facility], this was the appropriate size  Daughter states mother is 10'9 and weighs 308 pounds  Outreach to Ros Minor at [facility]  Discussed paperwork that was sent to CM  Reviewed requirements to get specialized wheelchair  Per Ros Minor a rehab specialist is often needed to complete assessment  Attempted 2 x to reach Leana Hill at [address] for conference call with Ros Minor  Unable to reach Leana Minor will reach out to patients family to explain the details and documentation needed to get wheelchair that family is requesting  CM will submit new order, most recent office notes and disclosure forms to Ros Minor  Office notes, demographics, order and additional forms ( requested by [employer]) successfully faxed to [facility] to the attention of Ros Minor

## 2021-07-07 NOTE — PROGRESS NOTES
Received phone call from daughter Tony Quezada is inquiring about status of wheelchair that has been requested  On 7/6 CM reached out to [email or web address] from [employer]  A letter of medical necessity is needed in addition to the PT/OT evaluation  At this time there is not an agency that is willing to go to patients home to make perform this assessment  Dr Christ May is willing to do home visit to try to provide documentation that is needed for patient to obtain a "tilt in space": wheelchair   sent email to Dr Juan Schaffer and Bishop Jiang to coordinate home visit with all parties  Tony Quezada states she is frustrated that wheelchair has not been received yet  Explained that due to nature of chair that is being requested, a great deal of documentation and assessments are needed to meet Medicare requirements  Suggested that Tony Quezada look up "tilt in space" wheelchair to ensure that this the wheelchair that she would like  Encouraged Tony Quezada to send picture of desired wheelchair to [facility] Br[facility]  S[employer]  Advised of above  Requested a sample of letter of Medical Necessity to attach to other required documents

## 2021-07-09 NOTE — PROGRESS NOTES
Virtual Brief Visit        Problem List Items Addressed This Visit        Respiratory    PORTIA treated with BiPAP - Primary (Chronic)       Cardiovascular and Mediastinum    Essential hypertension (Chronic)    Relevant Medications    diltiazem (CARDIZEM SR) 120 mg 12 hr capsule    Diastolic congestive heart failure (HCC)    Relevant Medications    diltiazem (CARDIZEM SR) 120 mg 12 hr capsule       Musculoskeletal and Integument    Generalized osteoarthritis (Chronic)    Muscular dystrophy, limb girdle (Chronic)       Other    Morbid obesity ([address] ) (Chronic)                Reason for visit is   Chief Complaint   Patient presents with   [facility] Virtual Brief Visit        Encounter provider Jayson Briones MD    Provider located at [address] [address]  [address] [place] [id number]    Recent Visits  Date Type Provider Dept   07/06/21 Telephone Aishwarya Garcia, [address] recent visits within past 7 days and meeting all other requirements  Today's Visits  Date Type Provider Dept   07/09/21 Telemedicine Jayson Briones MD [facility]   Showing today's visits and meeting all other requirements  Future Appointments  No visits were found meeting these conditions  Showing future appointments within next 150 days and meeting all other requirements       After connecting through telephone, the patient was identified by name and date of birth  Leanne Aguilar was informed that this is a telemedicine visit and that the visit is being conducted through telephone  My office door was closed  No one else was in the room  She acknowledged consent and understanding of privacy and security of the platform  The patient has agreed to participate and understands she can discontinue the visit at any time  Patient is aware this is a billable service       Subjective and HPI    Leanne Aguilar is a 80 y o  female  With history of muscular dystrophy limb girdle type, history of chronic RBBB, history of tachycardia, essential hypertension, history of sleep apnea on BiPAP, previous history of bicarb respiratory failure has been seen for above-mentioned cardiac problem  Patient is mostly bed bound and cannot go out  Hence she was seen on the phone  Her daughter help us with information  Patient is doing well denies any chest pain any shortness of breath or any other issues  They want conservative Rx her echo from 2019 reviewed  Her medications were discussed  She has no nausea no vomiting no PND no orthopnea she has other problem as mentioned above         Past Medical History:   Diagnosis Date    Acute bronchitis     Arthritis     Asthmatic bronchitis     Resolved 10/4/2017     CH[facility]congestive [facility] failure) [facility] [address] [facility]     Community acquired pneumonia     Resolved 10/4/2017     Foot fracture, left     Last assessed 5/27/2014     History of being hospitalized 08/2015    Nondisplaced right femur fracture; MS LEXI Gil[facility]    History of methicillin resistant staphylococcus aureus (MRSA)     7/29/2016 MRSA (nares) positive  Negative 2/21/2018   Isolation discontinued 2/25/2019    Hypertension     Morbid obesity ([facility]     Muscular dystrophy ([facility] [address] )     Thyroglossal duct cyst     Last assessed 5/27/2014        Past Surgical History:   Procedure Laterality Date    APPENDECTOMY      CHOLECYSTECTOMY      HYSTERECTOMY      IR PICC LINE  2/22/2019    THYROID CYST EXCISION      THYROID SURGERY      TOTAL ABDOMINAL HYSTERECTOMY      Last assessed 5/27/2014    VEIN SURGERY Bilateral        Current Outpatient Medications   Medication Sig Dispense Refill    acetaminophen (TYLENOL) 325 mg tablet Take one or 2 every 6 hours as needed for pain 30 tablet 0    celecoxib (CeleBREX) 200 mg capsule Take 1 capsule (200 mg total) by mouth daily 90 capsule 3    Cholecalciferol (VITAMIN D3) 125 MCG (5000 UT) TABS Take 1 tablet (5,000 Units total) by mouth daily 30 tablet 1    diltiazem (CARDIZEM CD) 240 mg 24 hr capsule Take 1 capsule (240 mg total) by mouth daily 90 capsule 3    fluocinonide (LIDEX) 0 05 % external solution Apply topically 2 (two) times a day 60 mL 2    fluticasone (FLONASE) 50 mcg/act nasal spray into each nostril Twice daily      furosemide (LASIX) 20 mg tablet TAKE ONE TABLET BY MOUTH EVERY DAY (Patient taking differently: Take 20 mg by mouth Every other day) 90 tablet 3    hydrocortisone (Cortizone-10) 1 % ointment Apply topically Twice daily      ipratropium-albuterol (DUO-NEB) 0 5-2 5 mg/3 mL nebulizer solution Take 1 vial (3 mL total) by nebulization every 6 (six) hours 120 vial 6    metoprolol tartrate (LOPRESSOR) 25 mg tablet Take 1 tablet (25 mg total) by mouth every 12 (twelve) hours 180 tablet 2    Multiple Vitamins-Minerals (PreserVision AREDS) CAPS Take 1 capsule by mouth daily      mupirocin (BACTROBAN) 2 % cream Apply topically      nystatin (MYCOSTATIN) cream Apply topically 2 (two) times a day 30 g 5    silver sulfadiazine (SILVADENE,SSD) 1 % cream Apply topically daily 50 g 5    triamcinolone (KENALOG) 0 1 % cream Apply topically 2 (two) times a day 80 g 5    diltiazem (CARDIZEM SR) 120 mg 12 hr capsule Take by mouth every 12 (twelve) hours (Patient not taking: Reported on 7/9/2021)      fluticasone-vilanterol (BREO ELLIPTA) 100-25 mcg/inh inhaler Inhale 1 puff daily Rinse mouth after use  (Patient not taking: Reported on 1/23/2020) 1 Inhaler 0     No current facility-administered medications for this visit  No Known Allergies    Review of Systems   Constitutional: Negative for activity change, chills, diaphoresis, fever and unexpected weight change  HENT: Negative for congestion  Eyes: Negative for discharge and redness  Respiratory: Negative for cough, chest tightness, shortness of breath and wheezing  Cardiovascular: Negative    Negative for chest pain, palpitations and leg swelling  Gastrointestinal: Negative for abdominal pain, diarrhea and nausea  Endocrine: Negative  Genitourinary: Negative for decreased urine volume and urgency  Musculoskeletal: Positive for arthralgias, back pain and gait problem  History of muscular dystrophy   Skin: Negative for rash and wound  Allergic/Immunologic: Negative  Neurological: Negative for dizziness, seizures, syncope, weakness, light-headedness and headaches  Hematological: Negative  Psychiatric/Behavioral: Positive for sleep disturbance  Negative for agitation and confusion  The patient is not nervous/anxious  Vitals:    07/09/21 1531   BP: 132/69   Pulse: 71   Temp: 98 1 °F (36 7 °C)   SpO2: 92%       On limited physical examination patient was alert awake oriented not any distress  She has no form conversational dyspnea  Assessment and plan    1  History of chronic hypercarbic respiratory failure with chronic hypocarbia  She uses BiPAP and follows up with Pulmonary  Continue BiPAP as before      2  Tachycardia with RBBB  Heart rate is much better since she is on Cardizem and metoprolol continue same medications  Vitals has been stable      3  History of sleep apnea on BiPAP     4  Muscular dystrophy limb girdle type  Patient is mostly bed-bound  continue supportive care she cannot go out      5  RBBB   EF is normal in 2019 family want conservative Rx           6  Essential hypertension    blood pressure is pretty well controlled  Will decrease Lasix to 20 mg  To every other day   Continue Cardizem and metoprolol  she takes Lasix every other day      8  Chronic diastolic heart failure  Currently she seems to be compensated  She is doing well with Lasix 20 mg every other day  She get home visit from her primary care doctor's who will do the blood test   Continue current Rx  Prescription discussed with them        I spent 10-15 minute with review of the chart, no dictation as well as interview with patient  VIRTUAL VISIT [facility] acknowledges that she has consented to an online visit or consultation  She understands that the online visit is based solely on information provided by her, and that, in the absence of a face-to-face physical evaluation by the physician, the diagnosis she receives is both limited and provisional in terms of accuracy and completeness  This is not intended to replace a full medical face-to-face evaluation by the physician  Sylvia Sebastian understands and accepts these terms

## 2021-07-21 NOTE — PROGRESS NOTES
Returned daughters phone call regarding status of specialized wheelchair  Daughter reports she has not reached out to Madonna [facility] yet  She has been waiting to hear back from Victorino Brittle at [facility]  Daughter reports that patients wheelchair is falling apart  Offered daughter wheelchair that CM has available for patient use  Declines  Family would like a reclining wheelchair  Explained at great length that a reclining wheelchair requires documentation and assessments that will demonstrate the need for this DME  Explained providers role in completing this in depth assessment that is needed to meet Medicare Guidelines  Explained CM role as the facilitator between provider and DME company  Explained that CM does not provide assessments or documentation  CM did receive sample letter of medical necessity from Sera Morrison at [facility]  Letter was placed in Dr Chelsie Castellon file for review  CM will discuss further with Dr Christ May  Email sent to Sera Morrison to reschedule home visit with Dr Christ May and himself to assist in navigating what is needed to complete families request for a reclining wheelchair

## 2021-07-22 NOTE — PROGRESS NOTES
Outreach to Thomas Marroquin at [facility]  Advised that they provide comprehensive wheelchair evaluations  Email sent to patients daughter Frank De La Cruz with information for Wheelchair Clinic at [facility]

## 2021-08-02 NOTE — TELEPHONE ENCOUNTER
Form received from [address] [place] [address]   Copy scanned into encounter  Placed in blue team folder at nurse station

## 2021-09-09 NOTE — PROGRESS NOTES
Returned daughters phone call  Calling to advise that patient is being evaluated for her wheelchair next week  States she did reach out to [facility] PT department at [facility]  Daughter also reports that patient has an open red area on left buttock  Size is larger than a nickel  A scab has now formed  Antibiotic ointment made wound worse  Has been using slivadene ointment with a dressing  Discussed with Dr Jimbo Urbina  Will just have daughter place dressing on wound  Notified daughter  Instructions provided  Daughter will notify CM if wound becomes worse

## 2021-09-24 NOTE — PROGRESS NOTES
Received phone call from daughter requesting home visit and flu vaccine  Paperwork also needs completion for new wheelchair  Will discuss with Dr Acacia Monge to arrange  No other needs identified

## 2021-09-28 NOTE — PROGRESS NOTES
Outreach to daughter Aracelis Silva  Advised of home visit to be done on 10/4 at 1:30 pm  Will contact CM if this is not an acceptable time and date  Pt needs order for wheelchair placed  Requested specifics regarding wheelchair so order can be placed correctly

## 2021-10-26 PROBLEM — E87.1 HYPONATREMIA: Status: RESOLVED | Noted: 2019-02-22 | Resolved: 2021-01-01

## 2021-10-26 PROBLEM — J44.9 COPD (CHRONIC OBSTRUCTIVE PULMONARY DISEASE) (HCC): Status: ACTIVE | Noted: 2021-01-01

## 2021-10-26 PROBLEM — E87.2 METABOLIC ACIDOSIS: Status: ACTIVE | Noted: 2021-01-01

## 2021-10-26 PROBLEM — G93.40 ENCEPHALOPATHY: Status: ACTIVE | Noted: 2021-01-01

## 2021-10-26 PROBLEM — A41.9 SEPSIS (HCC): Status: ACTIVE | Noted: 2021-01-01

## 2021-10-26 PROBLEM — E87.20 METABOLIC ACIDOSIS: Status: ACTIVE | Noted: 2021-01-01

## 2021-10-27 PROBLEM — J69.0 ASPIRATION PNEUMONIA (HCC): Status: ACTIVE | Noted: 2021-01-01

## 2021-10-28 VITALS
WEIGHT: 197 LBS | SYSTOLIC BLOOD PRESSURE: 110 MMHG | OXYGEN SATURATION: 5 % | HEIGHT: 68 IN | HEART RATE: 28 BPM | DIASTOLIC BLOOD PRESSURE: 55 MMHG | BODY MASS INDEX: 29.86 KG/M2 | TEMPERATURE: 98.6 F

## 2021-10-28 LAB
ATRIAL RATE: 136 BPM
P AXIS: 46 DEGREES
PR INTERVAL: 140 MS
QRS AXIS: 123 DEGREES
QRSD INTERVAL: 138 MS
QT INTERVAL: 320 MS
QTC INTERVAL: 454 MS
T WAVE AXIS: -3 DEGREES
VENTRICULAR RATE: 121 BPM

## 2021-10-29 LAB
BACTERIA SPT RESP CULT: NO GROWTH
GRAM STN SPEC: NORMAL
GRAM STN SPEC: NORMAL

## 2021-10-30 LAB
BACTERIA BLD CULT: ABNORMAL
GRAM STN SPEC: ABNORMAL

## 2021-10-31 LAB — BACTERIA BLD CULT: NORMAL

## 2021-11-01 ENCOUNTER — DOCUMENTATION (OUTPATIENT)
Dept: FAMILY MEDICINE CLINIC | Facility: CLINIC | Age: 86
End: 2021-11-01

## 2021-11-01 ENCOUNTER — EPISODE CHANGES (OUTPATIENT)
Dept: FAMILY MEDICINE CLINIC | Facility: CLINIC | Age: 86
End: 2021-11-01

## 2021-11-11 LAB
ATRIAL RATE: 98 BPM
P AXIS: 41 DEGREES
PR INTERVAL: 142 MS
QRS AXIS: 86 DEGREES
QRSD INTERVAL: 144 MS
QT INTERVAL: 392 MS
QTC INTERVAL: 500 MS
T WAVE AXIS: 26 DEGREES
VENTRICULAR RATE: 98 BPM